# Patient Record
Sex: FEMALE | Race: BLACK OR AFRICAN AMERICAN | NOT HISPANIC OR LATINO | Employment: OTHER | URBAN - METROPOLITAN AREA
[De-identification: names, ages, dates, MRNs, and addresses within clinical notes are randomized per-mention and may not be internally consistent; named-entity substitution may affect disease eponyms.]

---

## 2017-10-27 ENCOUNTER — HOSPITAL ENCOUNTER (EMERGENCY)
Facility: HOSPITAL | Age: 52
Discharge: HOME | End: 2017-10-27
Attending: EMERGENCY MEDICINE
Payer: MEDICAID

## 2017-10-27 VITALS
DIASTOLIC BLOOD PRESSURE: 90 MMHG | SYSTOLIC BLOOD PRESSURE: 191 MMHG | HEIGHT: 69 IN | TEMPERATURE: 99 F | HEART RATE: 78 BPM | WEIGHT: 175 LBS | OXYGEN SATURATION: 98 % | BODY MASS INDEX: 25.92 KG/M2 | RESPIRATION RATE: 16 BRPM

## 2017-10-27 DIAGNOSIS — K08.89 PAIN, DENTAL: Primary | ICD-10-CM

## 2017-10-27 PROCEDURE — 25000003 PHARM REV CODE 250: Performed by: PHYSICIAN ASSISTANT

## 2017-10-27 PROCEDURE — 99284 EMERGENCY DEPT VISIT MOD MDM: CPT | Mod: ,,, | Performed by: PHYSICIAN ASSISTANT

## 2017-10-27 PROCEDURE — 99283 EMERGENCY DEPT VISIT LOW MDM: CPT

## 2017-10-27 RX ORDER — ACETAMINOPHEN 325 MG/1
650 TABLET ORAL
Status: COMPLETED | OUTPATIENT
Start: 2017-10-27 | End: 2017-10-27

## 2017-10-27 RX ORDER — CLINDAMYCIN HYDROCHLORIDE 150 MG/1
300 CAPSULE ORAL 4 TIMES DAILY
Qty: 56 CAPSULE | Refills: 0 | Status: SHIPPED | OUTPATIENT
Start: 2017-10-27 | End: 2017-11-03

## 2017-10-27 RX ORDER — PROMETHAZINE HYDROCHLORIDE 25 MG/1
25 TABLET ORAL EVERY 4 HOURS
COMMUNITY

## 2017-10-27 RX ORDER — TRAMADOL HYDROCHLORIDE 50 MG/1
50 TABLET ORAL EVERY 6 HOURS PRN
COMMUNITY

## 2017-10-27 RX ORDER — CLINDAMYCIN HYDROCHLORIDE 150 MG/1
300 CAPSULE ORAL
Status: COMPLETED | OUTPATIENT
Start: 2017-10-27 | End: 2017-10-27

## 2017-10-27 RX ORDER — IBUPROFEN 600 MG/1
600 TABLET ORAL
Status: COMPLETED | OUTPATIENT
Start: 2017-10-27 | End: 2017-10-27

## 2017-10-27 RX ORDER — CLONAZEPAM 0.5 MG/1
0.5 TABLET ORAL DAILY
COMMUNITY

## 2017-10-27 RX ADMIN — ACETAMINOPHEN 650 MG: 325 TABLET ORAL at 09:10

## 2017-10-27 RX ADMIN — IBUPROFEN 600 MG: 600 TABLET, FILM COATED ORAL at 09:10

## 2017-10-27 RX ADMIN — CLINDAMYCIN HYDROCHLORIDE 300 MG: 150 CAPSULE ORAL at 09:10

## 2017-10-27 NOTE — ED TRIAGE NOTES
Presents to ER with Bilateral lower gum pain associated with dental caries for 3 days.    GENERAL: The patient is well-developed and well-nourished in no apparent distress. Alert and oriented x4.                                                HEENT: Head is normocephalic and atraumatic. Extraocular muscles are intact. Pupils are equal, round, and reactive to light and accommodation. Nares appeared normal. Mouth is well hydrated and without lesions. Mucous membranes are moist. Posterior pharynx clear of any exudate or lesions.    NECK: Supple. No carotid bruits. No lymphadenopathy or thyromegaly.    LUNGS: Clear to auscultation.    HEART: Regular rate and rhythm without murmur.     ABDOMEN: Soft, nontender, and nondistended. Positive bowel sounds. No hepatosplenomegaly was noted.     EXTREMITIES: Without any cyanosis, clubbing, rash, lesions or edema.     NEUROLOGIC: Cranial nerves II through XII are grossly intact.     PSYCHIATRIC: Flat affect, but denies suicidal or homicidal ideations.    SKIN: No ulceration or induration present.

## 2017-10-27 NOTE — ED PROVIDER NOTES
Encounter Date: 10/27/2017    SCRIBE #1 NOTE: I, Mahi Lazaro, am scribing for, and in the presence of,  Dr. Morris. I have scribed the following portions of the note - the APC attestation.       History     Chief Complaint   Patient presents with    Dental Pain     Pt reports toothache x 3 days.     Patient is a 82-year-old female with a past medical history of Crohn's disease to ER with bilateral lower gum pain associated with dental caries for 3 days. Patient states that she has had poor dentition for some time now.  She developed toothache 3 days ago, mainly to her right lower molars.  She complains of 10/10 pain.  She states that it hurts to eat and drink, and she has to chew with her front teeth due to the pain in the back.  She denies any fever or chills.  Patient states that her partner is in the hospital, and wanted to come into the ED to see if she can get her tooth pulled.          Review of patient's allergies indicates:   Allergen Reactions    Azathioprine Rash    Codeine Rash     Past Medical History:   Diagnosis Date    Crohn's disease      Past Surgical History:   Procedure Laterality Date    ABDOMINAL SURGERY      BACK SURGERY      COLECTOMY      TUBAL LIGATION       History reviewed. No pertinent family history.  Social History   Substance Use Topics    Smoking status: Never Smoker    Smokeless tobacco: Never Used    Alcohol use Yes      Comment: socially     Review of Systems   Constitutional: Positive for appetite change. Negative for chills and fever.   HENT: Positive for dental problem. Negative for facial swelling and sore throat.    Eyes: Negative for visual disturbance.   Respiratory: Negative for shortness of breath.    Cardiovascular: Negative for chest pain.   Gastrointestinal: Negative for nausea.   Endocrine: Negative for polyuria.   Genitourinary: Negative for dysuria and urgency.   Musculoskeletal: Negative for back pain.   Skin: Negative for rash.   Neurological:  Negative for weakness.   Hematological: Does not bruise/bleed easily.       Physical Exam     Initial Vitals [10/27/17 0836]   BP Pulse Resp Temp SpO2   (!) 191/90 78 16 98.6 °F (37 °C) 98 %      MAP       123.67         Physical Exam    Nursing note and vitals reviewed.  Constitutional: She appears well-developed and well-nourished. She is not diaphoretic. No distress.   HENT:   Head: Normocephalic and atraumatic.   Nose: Nose normal.   Mouth/Throat: Uvula is midline, oropharynx is clear and moist and mucous membranes are normal. No trismus in the jaw. Abnormal dentition. Dental caries present. No dental abscesses.       Eyes: Conjunctivae and EOM are normal.   Neck: Normal range of motion.   Cardiovascular: Normal rate, regular rhythm and normal heart sounds. Exam reveals no friction rub.    No murmur heard.  Pulmonary/Chest: Breath sounds normal. No respiratory distress. She has no wheezes. She has no rales.   Abdominal: Soft. Bowel sounds are normal. She exhibits no distension. There is no tenderness. There is no rebound.   Musculoskeletal: Normal range of motion.   Neurological: She is alert and oriented to person, place, and time. She has normal strength. No sensory deficit.   Skin: Skin is warm and dry. No erythema. No pallor.   Psychiatric: She has a normal mood and affect. Her behavior is normal. Judgment and thought content normal.         ED Course   Procedures  Labs Reviewed - No data to display          Medical Decision Making:   History:   Old Medical Records: I decided to obtain old medical records.       APC / Resident Notes:   On exam, afebrile.  Poor dentition.  Cracked right lower first molar with tenderness to palpation and surrounding erythema.  No dental abscess or peritonsillar abscess noted.  She is speaking in full sentences.  She does not need any imaging or labs.  I will treat her dental infection and prescribed oral clindamycin.  She is to take OTC ibuprofen and Tylenol on a scheduled  basis for pain.  She has tramadol for her Crohn's disease that she can take for severe pain prn.  I informed her that we do no pull teeth. She is to follow-up with the dentist.  Dental resources provided.  She is comfortable with plan and stable for discharge.  I have reviewed patient's chart and discussed this case with my supervising ANGELA Bills Attestation:   Yomairaibe #1: I performed the above scribed service and the documentation accurately describes the services I performed. I attest to the accuracy of the note.    Attending Attestation:     Physician Attestation Statement for NP/PA:   I discussed this assessment and plan of this patient with the NP/PA, but I did not personally examine the patient. The face to face encounter was performed by the NP/PA.    Other NP/PA Attestation Additions:    History of Present Illness:  52 y.o. woman complains of dental pain, especially a lower molar on the right. Her  is currently on the hospital and she thought she would come and get this looked at while here.     Medical Decision Making:  She has poor dentition but has one tooth that seems to be giving her most of her issues, the first mandibular molar on the right. Given that there is some gingival erythema surrounding the tooth, she was prescribed a course of antibiotics and was given a list of dental clinic resources.                 ED Course      Clinical Impression:   The encounter diagnosis was Pain, dental.    Disposition:   Disposition: Discharged  Condition: Stable                Brenda Pastrana PA-C  10/28/17 0731       Brenda Pastrana PA-C  10/28/17 0732

## 2017-10-27 NOTE — DISCHARGE INSTRUCTIONS
See dental resource sheet that is provided.  Call insurance to see which dentist you can see.    Take ibuprofen and tylenol on a scheduled basis for pain relief and take tramadol that is prescribed to you by your GI doctor for severe pain.  Take clindamycin 4 times a day which is every 6 hours for your dental pain.    No future appointments.    Our goal in the emergency department is to always give you outstanding care and exceptional service. You may receive a survey by mail or e-mail in the next week regarding your experience in our ED. We would greatly appreciate your completing and returning the survey. Your feedback provides us with a way to recognize our staff who give very good care and it helps us learn how to improve when your experience was below our aspiration of excellence.

## 2019-07-25 RX ORDER — ALBUTEROL SULFATE 0.63 MG/3ML
0.63 SOLUTION RESPIRATORY (INHALATION) EVERY 6 HOURS PRN
COMMUNITY

## 2019-07-25 RX ORDER — MULTIVITAMIN
1 TABLET ORAL DAILY
COMMUNITY

## 2019-07-25 RX ORDER — ALBUTEROL SULFATE 0.83 MG/ML
2.5 SOLUTION RESPIRATORY (INHALATION) EVERY 6 HOURS PRN
COMMUNITY

## 2019-08-05 ENCOUNTER — HOSPITAL ENCOUNTER (OUTPATIENT)
Facility: HOSPITAL | Age: 54
Discharge: HOME | End: 2019-08-05
Attending: INTERNAL MEDICINE | Admitting: INTERNAL MEDICINE
Payer: MEDICAID

## 2019-08-05 ENCOUNTER — ANESTHESIA (OUTPATIENT)
Dept: SURGERY | Facility: HOSPITAL | Age: 54
End: 2019-08-05
Payer: MEDICAID

## 2019-08-05 ENCOUNTER — ANESTHESIA EVENT (OUTPATIENT)
Dept: SURGERY | Facility: HOSPITAL | Age: 54
End: 2019-08-05
Payer: MEDICAID

## 2019-08-05 VITALS
OXYGEN SATURATION: 99 % | BODY MASS INDEX: 25.18 KG/M2 | WEIGHT: 170 LBS | RESPIRATION RATE: 16 BRPM | HEIGHT: 69 IN | DIASTOLIC BLOOD PRESSURE: 94 MMHG | HEART RATE: 60 BPM | TEMPERATURE: 98 F | SYSTOLIC BLOOD PRESSURE: 161 MMHG

## 2019-08-05 DIAGNOSIS — Z01.818 PRE-OP TESTING: ICD-10-CM

## 2019-08-05 DIAGNOSIS — R10.13 EPIGASTRIC ABDOMINAL PAIN OF UNKNOWN ETIOLOGY: ICD-10-CM

## 2019-08-05 PROCEDURE — 93005 ELECTROCARDIOGRAM TRACING: CPT

## 2019-08-05 PROCEDURE — 27000671 HC TUBING MICROBORE EXT: Performed by: ANESTHESIOLOGY

## 2019-08-05 PROCEDURE — 27200043 HC FORCEPS, BIOPSY: Performed by: INTERNAL MEDICINE

## 2019-08-05 PROCEDURE — 63600175 PHARM REV CODE 636 W HCPCS: Performed by: INTERNAL MEDICINE

## 2019-08-05 PROCEDURE — 37000009 HC ANESTHESIA EA ADD 15 MINS: Performed by: INTERNAL MEDICINE

## 2019-08-05 PROCEDURE — 00520 ANES CLOSED CHEST PX NOS: CPT | Mod: 59 | Performed by: INTERNAL MEDICINE

## 2019-08-05 PROCEDURE — 43239 EGD BIOPSY SINGLE/MULTIPLE: CPT | Performed by: INTERNAL MEDICINE

## 2019-08-05 PROCEDURE — 00731 ANES UPR GI NDSC PX NOS: CPT | Performed by: INTERNAL MEDICINE

## 2019-08-05 PROCEDURE — 63600175 PHARM REV CODE 636 W HCPCS: Performed by: NURSE ANESTHETIST, CERTIFIED REGISTERED

## 2019-08-05 PROCEDURE — 99152 MOD SED SAME PHYS/QHP 5/>YRS: CPT | Mod: 59 | Performed by: INTERNAL MEDICINE

## 2019-08-05 PROCEDURE — 37000008 HC ANESTHESIA 1ST 15 MINUTES: Performed by: INTERNAL MEDICINE

## 2019-08-05 PROCEDURE — 43450 DILATE ESOPHAGUS 1/MULT PASS: CPT | Performed by: INTERNAL MEDICINE

## 2019-08-05 RX ORDER — FENTANYL CITRATE 50 UG/ML
INJECTION, SOLUTION INTRAMUSCULAR; INTRAVENOUS
Status: DISCONTINUED | OUTPATIENT
Start: 2019-08-05 | End: 2019-08-05

## 2019-08-05 RX ORDER — ONDANSETRON 2 MG/ML
INJECTION INTRAMUSCULAR; INTRAVENOUS
Status: DISCONTINUED | OUTPATIENT
Start: 2019-08-05 | End: 2019-08-05

## 2019-08-05 RX ORDER — SODIUM CHLORIDE 0.9 % (FLUSH) 0.9 %
2 SYRINGE (ML) INJECTION
Status: DISCONTINUED | OUTPATIENT
Start: 2019-08-05 | End: 2019-08-05 | Stop reason: HOSPADM

## 2019-08-05 RX ORDER — SODIUM CHLORIDE 9 MG/ML
INJECTION, SOLUTION INTRAVENOUS CONTINUOUS
Status: DISCONTINUED | OUTPATIENT
Start: 2019-08-05 | End: 2019-08-05 | Stop reason: HOSPADM

## 2019-08-05 RX ORDER — PROPOFOL 10 MG/ML
VIAL (ML) INTRAVENOUS CONTINUOUS PRN
Status: DISCONTINUED | OUTPATIENT
Start: 2019-08-05 | End: 2019-08-05

## 2019-08-05 RX ADMIN — FENTANYL CITRATE 50 MCG: 50 INJECTION INTRAMUSCULAR; INTRAVENOUS at 09:08

## 2019-08-05 RX ADMIN — SODIUM CHLORIDE: 0.9 INJECTION, SOLUTION INTRAVENOUS at 08:08

## 2019-08-05 RX ADMIN — ONDANSETRON 4 MG: 2 INJECTION INTRAMUSCULAR; INTRAVENOUS at 09:08

## 2019-08-05 RX ADMIN — PROPOFOL 400 MCG/KG/MIN: 10 INJECTION, EMULSION INTRAVENOUS at 08:08

## 2019-08-05 NOTE — H&P
"Subjective:       Patient ID: Spencer Levi is a 54 y.o. female.    Chief Complaint:  No chief complaint on file.  dysphagia    History of Present Illness  Dysphagia  Patient complains of difficulty swallowing. The dysphagia occurs with solids Symptoms have been present for approximately 5 weeks. The symptoms are gradually worsening. The dysphagia has been intermittent and has been progressive.  She complains of occasional heartburn.  She denies melena, hematochezia, hematemesis, and coffee ground emesis.  This has been associated with belching.  She denies cough and early satiety.    10/2015 egd with grossly normal.  Biopsies to rule out Eoe normal  Review of Systems  A comprehensive review of systems was negative.      Objective:      BP (!) 159/116   Pulse (!) 55   Temp 98.1 °F (36.7 °C) (Oral)   Resp 19   Ht 5' 9" (1.753 m)   Wt 77.1 kg (170 lb)   SpO2 100%   Breastfeeding? No   BMI 25.10 kg/m²   Physical Exam:  General- Patient alert and oriented x3 in NAD  HEENT- PERRLA, EOMI, OP clear, MMM  Neck- No JVD, Lymphadenopathy, Thyromegaly  CV- Regular rate and rhythm, No Murmur/fredi/rubs  Resp- Lungs CTA Bilaterally, No increased WOB  GI- Non tender/non-distended, BS normoactive x4 quads, no HSM  Extrem- No cyanosis, clubbing, edema. Pulses 2+ and symmetric  Neuro- Strength 5/5 flexors/extensors, DTRs 2+ and symmetric, Intact sensation to light touch grossly     Data ReviewCBC: No results found for: WBC, RBC, HGB, HCT, PLT  BMP: No results found for: GLU, NA, K, CL, CO2, BUN, CREATININE, CALCIUM      Assessment:        1. Pre-op testing    2. Epigastric abdominal pain of unknown etiology       Ileal crohns patient stable  humira  Reports dysphagia distal esophagus     Plan:       egd today    "

## 2019-08-05 NOTE — TRANSFER OF CARE
"Anesthesia Transfer of Care Note    Patient: Spencer Levi    Procedure(s) Performed: Procedure(s) (LRB):  EGD (ESOPHAGOGASTRODUODENOSCOPY) (N/A)  DILATION, ESOPHAGUS (N/A)    Patient location: GI    Anesthesia Type: MAC    Transport from OR: Transported from OR on 2-3 L/min O2 by NC with adequate spontaneous ventilation    Post pain: adequate analgesia    Post assessment: no apparent anesthetic complications    Post vital signs: stable    Level of consciousness: awake and alert    Nausea/Vomiting: no nausea/vomiting    Complications: none    Transfer of care protocol was followed      Last vitals:   Visit Vitals  BP (!) 148/92 (BP Location: Left arm, Patient Position: Lying)   Pulse 63   Temp 36.5 °C (97.7 °F) (Oral)   Resp 17   Ht 5' 9" (1.753 m)   Wt 77.1 kg (170 lb)   SpO2 100%   Breastfeeding? No   BMI 25.10 kg/m²     "

## 2019-08-05 NOTE — TRANSFER OF CARE
"Anesthesia Transfer of Care Note    Patient: Spencer Levi    Procedure(s) Performed: Procedure(s) (LRB):  EGD (ESOPHAGOGASTRODUODENOSCOPY) (N/A)  DILATION, ESOPHAGUS (N/A)    Anesthesia PACU Handoff    Last vitals:   Visit Vitals  BP (!) 148/92 (BP Location: Left arm, Patient Position: Lying)   Pulse 63   Temp 36.5 °C (97.7 °F) (Oral)   Resp 17   Ht 5' 9" (1.753 m)   Wt 77.1 kg (170 lb)   SpO2 100%   Breastfeeding? No   BMI 25.10 kg/m²     "

## 2019-08-05 NOTE — [employer] PATIENT INSTRUCTIONS
Discharge Summary/Instructions after an Endoscopic Procedure  Patient Name: Spencer Levi  Patient MRN: [id number]  Patient YOB: 1965 Monday, August 05, 2019  Felicita Miller MD  RESTRICTIONS:  During your procedure today, you received medications for sedation.  These   medications may affect your judgment, balance and coordination.  Therefore,   for 24 hours, you have the following restrictions:   - DO NOT drive a car, operate machinery, make legal/financial decisions,   sign important papers or drink alcohol.    ACTIVITY:  Today: no heavy lifting, straining or running due to procedural   sedation/anesthesia.  The following day: return to full activity including work.  DIET:  Eat and drink normally unless instructed otherwise.     TREATMENT FOR COMMON SIDE EFFECTS:  - Mild abdominal pain, nausea, belching, bloating or excessive gas:  rest,   eat lightly and use a heating pad.  - Sore Throat: treat with throat lozenges and/or gargle with warm salt   water.  - Because air was used during the procedure, expelling large amounts of air   from your rectum or belching is normal.  - If a bowel prep was taken, you may not have a bowel movement for 1-3 days.    This is normal.  SYMPTOMS TO WATCH FOR AND REPORT TO YOUR PHYSICIAN:  1. Abdominal pain or bloating, other than gas cramps.  2. Chest pain.  3. Back pain.  4. Signs of infection such as: chills or fever occurring within 24 hours   after the procedure.  5. Rectal bleeding, which would show as bright red, maroon, or black stools.   (A tablespoon of blood from the rectum is not serious, especially if   hemorrhoids are present.)  6. Vomiting.  7. Weakness or dizziness.  GO DIRECTLY TO THE NEAREST EMERGENCY ROOM IF YOU HAVE ANY OF THE FOLLOWING:      Difficulty breathing              Chills and/or fever over 101 F   Persistent vomiting and/or vomiting blood   Severe abdominal pain   Severe chest pain   Black, tarry stools   Bleeding- more than one  tablespoon   Any other symptom or condition that you feel may need urgent attention  Your doctor recommends these additional instructions:  If any biopsies were taken, your doctors clinic will contact you in 1 to 2   weeks with any results.  - Await pathology results.   - Repeat upper endoscopy PRN for retreatment.   - Discharge patient to home (with escort).  For questions, problems or results please call your physician - Felicita Miller MD at Work:  [phone].  [facility], EMERGENCY ROOM PHONE NUMBER: [phone]  IF A COMPLICATION OR EMERGENCY SITUATION ARISES AND YOU ARE UNABLE TO REACH   YOUR PHYSICIAN - GO DIRECTLY TO THE EMERGENCY ROOM.  Felicita Miller MD  8/5/2019 9:01:53 AM  This report has been verified and signed electronically.  [employer]

## 2019-08-05 NOTE — ANESTHESIA PREPROCEDURE EVALUATION
08/05/2019  Spencer Levi is a 54 y.o., female.    Anesthesia Evaluation    I have reviewed the Patient Summary Reports.    I have reviewed the Nursing Notes.   I have reviewed the Medications.     Review of Systems  Anesthesia Hx:  No problems with previous Anesthesia  History of prior surgery of interest to airway management or planning: Previous anesthesia: General, MAC Denies Family Hx of Anesthesia complications.   Denies Personal Hx of Anesthesia complications.   Social:  Non-Smoker    EENT/Dental:   Denies Throat Symptoms Denies Throat Disease.    Cardiovascular:  Cardiovascular Normal     Pulmonary:   Asthma asymptomatic    Hepatic/GI:   PUD,    Musculoskeletal:  Spine Disorders: lumbar Degenerative disease and Chronic Pain    Psych:   Psychiatric History anxiety depression          Physical Exam  General:  Well nourished    Airway/Jaw/Neck:  Airway Findings: Mouth Opening: Normal Tongue: Normal  General Airway Assessment: Adult  Mallampati: II  Improves to II with phonation.  TM Distance: Normal, at least 6 cm  Jaw/Neck Findings:  Neck ROM: Normal ROM       Chest/Lungs:  Chest/Lungs Findings: Clear to auscultation, Normal Respiratory Rate     Heart/Vascular:  Heart Findings: Rate: Normal  Rhythm: Regular Rhythm  Sounds: Normal     Abdomen:  Abdomen Findings: Normal    Musculoskeletal:  Musculoskeletal Findings: Normal   Skin:  Skin Findings: Normal    Mental Status:  Mental Status Findings:  Cooperative, Alert and Oriented         Anesthesia Plan  Type of Anesthesia, risks & benefits discussed:  Anesthesia Type:  MAC  Patient's Preference:   Intra-op Monitoring Plan: standard ASA monitors  Intra-op Monitoring Plan Comments:   Post Op Pain Control Plan: per primary service following discharge from PACU  Post Op Pain Control Plan Comments:   Induction:    Beta Blocker:  Patient is not currently on a  Beta-Blocker (No further documentation required).       Informed Consent: Patient understands risks and agrees with Anesthesia plan.  Questions answered. Anesthesia consent signed with patient.  ASA Score: 3     Day of Surgery Review of History & Physical:        Anesthesia Plan Notes: MAC propofol        Ready For Surgery From Anesthesia Perspective.

## 2019-08-05 NOTE — ANESTHESIA POSTPROCEDURE EVALUATION
Anesthesia Post Evaluation    Patient: Spencer Levi    Procedure(s) Performed: Procedure(s) (LRB):  EGD (ESOPHAGOGASTRODUODENOSCOPY) (N/A)  DILATION, ESOPHAGUS (N/A)    Final Anesthesia Type: MAC  Patient location during evaluation: GI PACU  Patient participation: Yes- Able to Participate  Level of consciousness: awake and alert and awake  Post-procedure vital signs: reviewed and stable  Pain management: adequate  Airway patency: patent  PONV status at discharge: No PONV  Anesthetic complications: no      Cardiovascular status: blood pressure returned to baseline and hemodynamically stable  Respiratory status: unassisted, spontaneous ventilation and room air  Hydration status: euvolemic  Follow-up not needed.          Vitals Value Taken Time   /86 8/5/2019  9:13 AM   Temp 36.5 °C (97.7 °F) 8/5/2019  8:58 AM   Pulse 53 8/5/2019  9:13 AM   Resp 16 8/5/2019  9:13 AM   SpO2 99 % 8/5/2019  9:13 AM         No case tracking events are documented in the log.      Pain/Miguelito Score: No data recorded

## 2019-10-21 ENCOUNTER — HOSPITAL ENCOUNTER (OUTPATIENT)
Facility: HOSPITAL | Age: 54
Discharge: HOME | End: 2019-10-21
Attending: INTERNAL MEDICINE | Admitting: INTERNAL MEDICINE
Payer: MEDICAID

## 2019-10-21 ENCOUNTER — ANESTHESIA EVENT (OUTPATIENT)
Dept: SURGERY | Facility: HOSPITAL | Age: 54
End: 2019-10-21
Payer: MEDICAID

## 2019-10-21 ENCOUNTER — ANESTHESIA (OUTPATIENT)
Dept: SURGERY | Facility: HOSPITAL | Age: 54
End: 2019-10-21
Payer: MEDICAID

## 2019-10-21 VITALS
OXYGEN SATURATION: 100 % | BODY MASS INDEX: 23.7 KG/M2 | DIASTOLIC BLOOD PRESSURE: 99 MMHG | HEIGHT: 69 IN | SYSTOLIC BLOOD PRESSURE: 164 MMHG | WEIGHT: 160 LBS | RESPIRATION RATE: 16 BRPM | HEART RATE: 50 BPM | TEMPERATURE: 98 F

## 2019-10-21 DIAGNOSIS — K50.90 CROHN DISEASE: ICD-10-CM

## 2019-10-21 LAB
ERYTHROCYTE [DISTWIDTH] IN BLOOD BY AUTOMATED COUNT: 12.9 % (ref 11.5–14.5)
HCT VFR BLD AUTO: 39.8 % (ref 37–48.5)
HGB BLD-MCNC: 12.9 G/DL (ref 12–16)
MCH RBC QN AUTO: 31.7 PG (ref 27–31)
MCHC RBC AUTO-ENTMCNC: 32.4 G/DL (ref 32–36)
MCV RBC AUTO: 98 FL (ref 82–98)
PLATELET # BLD AUTO: 215 K/UL (ref 150–350)
PMV BLD AUTO: 11.3 FL (ref 9.2–12.9)
RBC # BLD AUTO: 4.07 M/UL (ref 4–5.4)
WBC # BLD AUTO: 3.78 K/UL (ref 3.9–12.7)

## 2019-10-21 PROCEDURE — 27200043 HC FORCEPS, BIOPSY: Performed by: INTERNAL MEDICINE

## 2019-10-21 PROCEDURE — 45385 COLONOSCOPY W/LESION REMOVAL: CPT | Performed by: INTERNAL MEDICINE

## 2019-10-21 PROCEDURE — 45380 COLONOSCOPY AND BIOPSY: CPT | Performed by: INTERNAL MEDICINE

## 2019-10-21 PROCEDURE — 37000009 HC ANESTHESIA EA ADD 15 MINS: Performed by: INTERNAL MEDICINE

## 2019-10-21 PROCEDURE — 63600175 PHARM REV CODE 636 W HCPCS: Performed by: NURSE ANESTHETIST, CERTIFIED REGISTERED

## 2019-10-21 PROCEDURE — 63600175 PHARM REV CODE 636 W HCPCS: Performed by: INTERNAL MEDICINE

## 2019-10-21 PROCEDURE — 27201114 HC TRAP (ANY): Performed by: INTERNAL MEDICINE

## 2019-10-21 PROCEDURE — 27201089 HC SNARE, DISP (ANY): Performed by: INTERNAL MEDICINE

## 2019-10-21 PROCEDURE — 37000008 HC ANESTHESIA 1ST 15 MINUTES: Performed by: INTERNAL MEDICINE

## 2019-10-21 PROCEDURE — 00811 ANES LWR INTST NDSC NOS: CPT | Performed by: INTERNAL MEDICINE

## 2019-10-21 PROCEDURE — 85027 COMPLETE CBC AUTOMATED: CPT

## 2019-10-21 RX ORDER — SODIUM CHLORIDE 0.9 % (FLUSH) 0.9 %
2 SYRINGE (ML) INJECTION
Status: DISCONTINUED | OUTPATIENT
Start: 2019-10-21 | End: 2019-10-21 | Stop reason: HOSPADM

## 2019-10-21 RX ORDER — SODIUM CHLORIDE 9 MG/ML
INJECTION, SOLUTION INTRAVENOUS CONTINUOUS
Status: DISCONTINUED | OUTPATIENT
Start: 2019-10-21 | End: 2019-10-21 | Stop reason: HOSPADM

## 2019-10-21 RX ORDER — MEPERIDINE HYDROCHLORIDE 50 MG/ML
25 INJECTION INTRAMUSCULAR; INTRAVENOUS; SUBCUTANEOUS ONCE
Status: COMPLETED | OUTPATIENT
Start: 2019-10-21 | End: 2019-10-21

## 2019-10-21 RX ORDER — SODIUM CHLORIDE 9 MG/ML
INJECTION, SOLUTION INTRAVENOUS CONTINUOUS PRN
Status: DISCONTINUED | OUTPATIENT
Start: 2019-10-21 | End: 2019-10-21

## 2019-10-21 RX ORDER — PROPOFOL 10 MG/ML
VIAL (ML) INTRAVENOUS
Status: DISCONTINUED | OUTPATIENT
Start: 2019-10-21 | End: 2019-10-21

## 2019-10-21 RX ADMIN — PROPOFOL 20 MG: 10 INJECTION, EMULSION INTRAVENOUS at 09:10

## 2019-10-21 RX ADMIN — SODIUM CHLORIDE: 0.9 INJECTION, SOLUTION INTRAVENOUS at 09:10

## 2019-10-21 RX ADMIN — PROPOFOL 30 MG: 10 INJECTION, EMULSION INTRAVENOUS at 09:10

## 2019-10-21 RX ADMIN — PROPOFOL 40 MG: 10 INJECTION, EMULSION INTRAVENOUS at 09:10

## 2019-10-21 RX ADMIN — PROPOFOL 80 MG: 10 INJECTION, EMULSION INTRAVENOUS at 09:10

## 2019-10-21 RX ADMIN — Medication 25 MG: at 10:10

## 2019-10-21 NOTE — [employer]ATION PATIENT INSTRUCTIONS
Discharge Summary/Instructions after an Endoscopic Procedure  Patient Name: Spencer Levi  Patient MRN: [id number]  Patient YOB: 1965 Monday, October 21, 2019  Felicita Miller MD  RESTRICTIONS:  During your procedure today, you received medications for sedation.  These   medications may affect your judgment, balance and coordination.  Therefore,   for 24 hours, you have the following restrictions:   - DO NOT drive a car, operate machinery, make legal/financial decisions,   sign important papers or drink alcohol.    ACTIVITY:  Today: no heavy lifting, straining or running due to procedural   sedation/anesthesia.  The following day: return to full activity including work.  DIET:  Eat and drink normally unless instructed otherwise.     TREATMENT FOR COMMON SIDE EFFECTS:  - Mild abdominal pain, nausea, belching, bloating or excessive gas:  rest,   eat lightly and use a heating pad.  - Sore Throat: treat with throat lozenges and/or gargle with warm salt   water.  - Because air was used during the procedure, expelling large amounts of air   from your rectum or belching is normal.  - If a bowel prep was taken, you may not have a bowel movement for 1-3 days.    This is normal.  SYMPTOMS TO WATCH FOR AND REPORT TO YOUR PHYSICIAN:  1. Abdominal pain or bloating, other than gas cramps.  2. Chest pain.  3. Back pain.  4. Signs of infection such as: chills or fever occurring within 24 hours   after the procedure.  5. Rectal bleeding, which would show as bright red, maroon, or black stools.   (A tablespoon of blood from the rectum is not serious, especially if   hemorrhoids are present.)  6. Vomiting.  7. Weakness or dizziness.  GO DIRECTLY TO THE NEAREST EMERGENCY ROOM IF YOU HAVE ANY OF THE FOLLOWING:      Difficulty breathing              Chills and/or fever over 101 F   Persistent vomiting and/or vomiting blood   Severe abdominal pain   Severe chest pain   Black, tarry stools   Bleeding- more than one  tablespoon   Any other symptom or condition that you feel may need urgent attention  Your doctor recommends these additional instructions:  If any biopsies were taken, your doctors clinic will contact you in 1 to 2   weeks with any results.  - Await pathology results.   - Repeat colonoscopy in 3 years for surveillance.   - Use Humira (adalimumab) at 40 mg per 0.8 mL subcutaneously every other   week indefinitely.   - Discharge patient to home (with escort).  For questions, problems or results please call your physician - Felicita Miller MD at Work:  [phone].  formerly [facility], EMERGENCY ROOM PHONE NUMBER: [phone]  IF A COMPLICATION OR EMERGENCY SITUATION ARISES AND YOU ARE UNABLE TO REACH   YOUR PHYSICIAN - GO DIRECTLY TO THE EMERGENCY ROOM.  Felicita Miller MD  10/21/2019 9:34:13 AM  This report has been verified and signed electronically.  [employer]

## 2019-10-21 NOTE — ANESTHESIA PREPROCEDURE EVALUATION
10/21/2019  Spencer Levi is a 54 y.o., female.    Pre-op Assessment    I have reviewed the Patient Summary Reports.     I have reviewed the Nursing Notes.   I have reviewed the Medications.     Review of Systems  Anesthesia Hx:  No problems with previous Anesthesia  History of prior surgery of interest to airway management or planning: Previous anesthesia: General, MAC Denies Family Hx of Anesthesia complications.   Denies Personal Hx of Anesthesia complications.   Social:  Non-Smoker    EENT/Dental:   Denies Throat Symptoms Denies Throat Disease.    Cardiovascular:  Cardiovascular Normal     Pulmonary:   Asthma asymptomatic    Hepatic/GI:   PUD,    Musculoskeletal:  Spine Disorders: lumbar Degenerative disease and Chronic Pain    Psych:   Psychiatric History anxiety depression          Physical Exam  General:  Well nourished    Airway/Jaw/Neck:  Airway Findings: Mouth Opening: Normal Tongue: Normal  General Airway Assessment: Adult  Mallampati: II  Improves to I with phonation.  TM Distance: Normal, at least 6 cm  Jaw/Neck Findings:  Neck ROM: Normal ROM       Chest/Lungs:  Chest/Lungs Findings: Clear to auscultation, Normal Respiratory Rate     Heart/Vascular:  Heart Findings: Rate: Normal  Rhythm: Regular Rhythm  Sounds: Normal  Heart murmur: negative    Abdomen:  Abdomen Findings: Normal    Musculoskeletal:  Musculoskeletal Findings: Normal   Skin:  Skin Findings: Normal    Mental Status:  Mental Status Findings:  Cooperative, Alert and Oriented         Anesthesia Plan  Type of Anesthesia, risks & benefits discussed:  Anesthesia Type:  MAC  Patient's Preference:   Intra-op Monitoring Plan: standard ASA monitors  Intra-op Monitoring Plan Comments:   Post Op Pain Control Plan: per primary service following discharge from PACU  Post Op Pain Control Plan Comments:   Induction:    Beta Blocker:  Patient is  not currently on a Beta-Blocker (No further documentation required).       Informed Consent: Patient understands risks and agrees with Anesthesia plan.  Questions answered. Anesthesia consent signed with patient.  ASA Score: 2     Day of Surgery Review of History & Physical:        Anesthesia Plan Notes: MAC propofol        Ready For Surgery From Anesthesia Perspective.

## 2019-10-21 NOTE — H&P
[facility]  Gastroenterology  Consult Note    Patient Name: Spencer Levi  MRN: [id number]  Admission Date: 10/21/2019  Hospital Length of Stay: 0 days  Code Status: Full Code   Attending Provider: Felicita Miller MD   Consulting Provider: Felicita Miller MD  Primary Care Physician: Miles Mabry MD  Principal Problem:<principal problem not specified>    Consults  Subjective:     HPI:  54-year-old female patient currently on Humira monotherapy doing well since the ileocecal to me in 2009.  She has been doing quite well. Her last surveillance colonoscopy    Was in 2017.  Pertinent Past IBD Therapies:  Steroids methotrexate and Humira    Pertinent Past GI Studies: CT: ileocecectomy    Past Medical History:   Diagnosis Date    Anxiety U    Asthma     Crohn's disease     Depression U    Dysphagia U    Menopause     Nausea & vomiting U    Peptic ulcer disease U    Scoliosis U        Past Surgical History:   Procedure Laterality Date    ABDOMINAL SURGERY      APPENDECTOMY      BACK SURGERY      COLECTOMY      COLECTOMY  2007    COLONOSCOPY  2014    COLONOSCOPY  10/30/2017    ESOPHAGEAL DILATION N/A 8/5/2019    Procedure: DILATION, ESOPHAGUS;  Surgeon: Felicita Miller MD;  Location: [facility];  Service: Endoscopy;  Laterality: N/A;    ESOPHAGOGASTRODUODENOSCOPY  05/12/2014    ESOPHAGOGASTRODUODENOSCOPY N/A 8/5/2019    Procedure: EGD (ESOPHAGOGASTRODUODENOSCOPY);  Surgeon: Felicita Miller MD;  Location: [facility];  Service: Endoscopy;  Laterality: N/A;    ESOPHAGOGASTRODUODENOSCOPY (EGD) WITH DILATION  10/19/2015    INSERTION OF VENOUS ACCESS PORT  2007    TUBAL LIGATION         Review of patient's allergies indicates:   Allergen Reactions    Azathioprine Rash    Codeine Rash     Family History     None        Tobacco Use    Smoking status: Current Every Day Smoker     Packs/day: 0.25    Smokeless tobacco: Never Used   Substance and Sexual Activity    Alcohol use: Yes      Alcohol/week: 3.0 standard drinks     Types: 3 Shots of liquor per week     Comment: socially    Drug use: Yes     Frequency: 14.0 times per week     Types: Marijuana     Comment: daily    Sexual activity: Yes     Partners: Male     Birth control/protection: Condom     Review of Systems  Objective:     Vital Signs (Most Recent):  Temp: 98.3 °F (36.8 °C) (10/21/19 0748)  Pulse: 64 (10/21/19 0748)  Resp: 18 (10/21/19 0748)  BP: (!) 165/97 (10/21/19 0748)  SpO2: 97 % (10/21/19 0748) Vital Signs (24h Range):  Temp:  [98.3 °F (36.8 °C)] 98.3 °F (36.8 °C)  Pulse:  [64] 64  Resp:  [18] 18  SpO2:  [97 %] 97 %  BP: (165)/(97) 165/97     Weight: 72.6 kg (160 lb) (10/21/19 0748)  Body mass index is 23.63 kg/m².    No intake or output data in the 24 hours ending 10/21/19 0801    Lines/Drains/Airways     Peripheral Intravenous Line                 Peripheral IV - Single Lumen 10/21/19 0750 20 G Right Forearm less than 1 day                Physical Exam  Physical Exam:  General- Patient alert and oriented x3 in NAD  HEENT- PERRLA, EOMI, OP clear, MMM  Neck- No JVD, Lymphadenopathy, Thyromegaly  CV- Regular rate and rhythm, No Murmur/fredi/rubs  Resp- Lungs CTA Bilaterally, No increased WOB  GI- Non tender/non-distended, BS normoactive x4 quads, no HSM  Extrem- No cyanosis, clubbing, edema. Pulses 2+ and symmetric  Neuro- Strength 5/5 flexors/extensors, DTRs 2+ and symmetric, Intact sensation to light touch grossly   Significant Labs:  CBC: No results for input(s): WBC, HGB, HCT, PLT in the last 48 hours.  CMP: No results for input(s): GLU, CALCIUM, ALBUMIN, PROT, NA, K, CO2, CL, BUN, CREATININE, ALKPHOS, ALT, AST, BILITOT in the last 48 hours.     Significant Imaging:  CT: I have reviewed all results within the past 24 hours and my personal findings are:  ilecocecectomy    Assessment/Plan:     Active Diagnoses:    Diagnosis Date Noted POA    Crohn disease [K50.90] 10/21/2019 Yes      Problems Resolved During this Admission:        crohns surveillance colonoscopy today    Thank you for your consult. I will follow-up with patient. Please contact us if you have any additional questions.    Felicita Miller MD  Gastroenterology  [facility]

## 2019-10-21 NOTE — ANESTHESIA POSTPROCEDURE EVALUATION
Anesthesia Post Evaluation    Patient: Spencer Levi    Procedure(s) Performed: Procedure(s) (LRB):  COLONOSCOPY (N/A)    Final Anesthesia Type: MAC  Patient location during evaluation: GI PACU  Patient participation: Yes- Able to Participate  Level of consciousness: awake and alert  Post-procedure vital signs: reviewed and stable  Pain management: adequate  Airway patency: patent  PONV status at discharge: No PONV  Anesthetic complications: no      Cardiovascular status: stable  Respiratory status: unassisted  Hydration status: euvolemic  Follow-up not needed.          Vitals Value Taken Time   /93 10/21/2019  9:32 AM   Temp 36.6 °C (97.9 °F) 10/21/2019  9:32 AM   Pulse 70 10/21/2019  9:32 AM   Resp 16 10/21/2019  9:32 AM   SpO2 100 % 10/21/2019  9:32 AM         No case tracking events are documented in the log.      Pain/Miguelito Score: Pain Rating Prior to Med Admin: 10 (10/21/2019  9:55 AM)

## 2020-12-01 ENCOUNTER — TELEPHONE (OUTPATIENT)
Dept: INFUSION THERAPY | Facility: HOSPITAL | Age: 55
End: 2020-12-01

## 2020-12-07 PROBLEM — Z95.828 ACQUIRED PORTAL-SYSTEMIC SHUNT: Status: ACTIVE | Noted: 2020-12-07

## 2020-12-11 ENCOUNTER — INFUSION (OUTPATIENT)
Dept: INFUSION THERAPY | Facility: HOSPITAL | Age: 55
End: 2020-12-11
Attending: INTERNAL MEDICINE
Payer: MEDICAID

## 2020-12-11 VITALS
DIASTOLIC BLOOD PRESSURE: 87 MMHG | RESPIRATION RATE: 15 BRPM | HEART RATE: 68 BPM | HEIGHT: 69 IN | TEMPERATURE: 97 F | BODY MASS INDEX: 26.36 KG/M2 | OXYGEN SATURATION: 97 % | WEIGHT: 178 LBS | SYSTOLIC BLOOD PRESSURE: 138 MMHG

## 2020-12-11 DIAGNOSIS — Z95.828 ACQUIRED PORTAL-SYSTEMIC SHUNT: Primary | ICD-10-CM

## 2020-12-11 PROCEDURE — 63600175 PHARM REV CODE 636 W HCPCS: Performed by: INTERNAL MEDICINE

## 2020-12-11 PROCEDURE — 96523 IRRIG DRUG DELIVERY DEVICE: CPT

## 2020-12-11 RX ORDER — HEPARIN 100 UNIT/ML
500 SYRINGE INTRAVENOUS
Status: COMPLETED | OUTPATIENT
Start: 2020-12-11 | End: 2020-12-11

## 2020-12-11 RX ORDER — HEPARIN 100 UNIT/ML
500 SYRINGE INTRAVENOUS
Status: CANCELLED | OUTPATIENT
Start: 2020-12-11

## 2020-12-11 RX ADMIN — HEPARIN 500 UNITS: 100 SYRINGE at 09:12

## 2020-12-11 NOTE — PLAN OF CARE
Problem: Infection  Goal: Infection Symptom Resolution  Outcome: Ongoing, Progressing  Intervention: Prevent or Manage Infection  Flowsheets (Taken 12/11/2020 1298)  Infection Management: aseptic technique maintained  Isolation Precautions:   protective environment initiated   protective environment maintained

## 2021-02-05 ENCOUNTER — INFUSION (OUTPATIENT)
Dept: INFUSION THERAPY | Facility: HOSPITAL | Age: 56
End: 2021-02-05
Attending: INTERNAL MEDICINE
Payer: MEDICAID

## 2021-02-05 VITALS
BODY MASS INDEX: 25.5 KG/M2 | SYSTOLIC BLOOD PRESSURE: 153 MMHG | RESPIRATION RATE: 18 BRPM | TEMPERATURE: 98 F | HEART RATE: 62 BPM | HEIGHT: 69 IN | WEIGHT: 172.19 LBS | OXYGEN SATURATION: 98 % | DIASTOLIC BLOOD PRESSURE: 93 MMHG

## 2021-02-05 DIAGNOSIS — Z95.828 ACQUIRED PORTAL-SYSTEMIC SHUNT: Primary | ICD-10-CM

## 2021-02-05 PROCEDURE — 63600175 PHARM REV CODE 636 W HCPCS: Performed by: INTERNAL MEDICINE

## 2021-02-05 PROCEDURE — 96523 IRRIG DRUG DELIVERY DEVICE: CPT

## 2021-02-05 RX ORDER — HEPARIN 100 UNIT/ML
500 SYRINGE INTRAVENOUS
Status: CANCELLED | OUTPATIENT
Start: 2021-02-05

## 2021-02-05 RX ORDER — HEPARIN 100 UNIT/ML
500 SYRINGE INTRAVENOUS
Status: COMPLETED | OUTPATIENT
Start: 2021-02-05 | End: 2021-02-05

## 2021-02-05 RX ADMIN — HEPARIN 500 UNITS: 100 SYRINGE at 10:02

## 2021-04-30 ENCOUNTER — INFUSION (OUTPATIENT)
Dept: INFUSION THERAPY | Facility: HOSPITAL | Age: 56
End: 2021-04-30
Attending: INTERNAL MEDICINE
Payer: MEDICAID

## 2021-04-30 VITALS
RESPIRATION RATE: 18 BRPM | OXYGEN SATURATION: 96 % | HEART RATE: 68 BPM | WEIGHT: 163.88 LBS | BODY MASS INDEX: 23.46 KG/M2 | TEMPERATURE: 97 F | DIASTOLIC BLOOD PRESSURE: 85 MMHG | SYSTOLIC BLOOD PRESSURE: 144 MMHG | HEIGHT: 70 IN

## 2021-04-30 DIAGNOSIS — Z95.828 ACQUIRED PORTAL-SYSTEMIC SHUNT: Primary | ICD-10-CM

## 2021-04-30 PROCEDURE — 96523 IRRIG DRUG DELIVERY DEVICE: CPT

## 2021-04-30 PROCEDURE — 63600175 PHARM REV CODE 636 W HCPCS: Performed by: INTERNAL MEDICINE

## 2021-04-30 RX ORDER — HEPARIN 100 UNIT/ML
500 SYRINGE INTRAVENOUS
Status: COMPLETED | OUTPATIENT
Start: 2021-04-30 | End: 2021-04-30

## 2021-04-30 RX ORDER — HEPARIN 100 UNIT/ML
500 SYRINGE INTRAVENOUS
Status: CANCELLED | OUTPATIENT
Start: 2021-04-30

## 2021-04-30 RX ADMIN — HEPARIN 500 UNITS: 100 SYRINGE at 10:04

## 2021-11-18 ENCOUNTER — TELEPHONE (OUTPATIENT)
Dept: HEMATOLOGY/ONCOLOGY | Facility: CLINIC | Age: 56
End: 2021-11-18
Payer: MEDICAID

## 2021-11-23 ENCOUNTER — INFUSION (OUTPATIENT)
Dept: INFUSION THERAPY | Facility: HOSPITAL | Age: 56
End: 2021-11-23
Attending: INTERNAL MEDICINE
Payer: MEDICAID

## 2021-11-23 VITALS
WEIGHT: 154.69 LBS | BODY MASS INDEX: 22.85 KG/M2 | DIASTOLIC BLOOD PRESSURE: 96 MMHG | SYSTOLIC BLOOD PRESSURE: 173 MMHG | RESPIRATION RATE: 17 BRPM | TEMPERATURE: 97 F | HEART RATE: 80 BPM

## 2021-11-23 DIAGNOSIS — Z95.828 ACQUIRED PORTAL-SYSTEMIC SHUNT: Primary | ICD-10-CM

## 2021-11-23 PROCEDURE — 63600175 PHARM REV CODE 636 W HCPCS: Performed by: INTERNAL MEDICINE

## 2021-11-23 PROCEDURE — 96523 IRRIG DRUG DELIVERY DEVICE: CPT

## 2021-11-23 RX ORDER — HEPARIN 100 UNIT/ML
500 SYRINGE INTRAVENOUS
Status: COMPLETED | OUTPATIENT
Start: 2021-11-23 | End: 2021-11-23

## 2021-11-23 RX ORDER — HEPARIN 100 UNIT/ML
500 SYRINGE INTRAVENOUS
Status: CANCELLED | OUTPATIENT
Start: 2021-11-23

## 2021-11-23 RX ADMIN — HEPARIN 500 UNITS: 100 SYRINGE at 02:11

## 2022-04-29 ENCOUNTER — INFUSION (OUTPATIENT)
Dept: INFUSION THERAPY | Facility: HOSPITAL | Age: 57
End: 2022-04-29
Attending: INTERNAL MEDICINE
Payer: MEDICAID

## 2022-04-29 VITALS
SYSTOLIC BLOOD PRESSURE: 150 MMHG | HEART RATE: 69 BPM | WEIGHT: 146.81 LBS | RESPIRATION RATE: 16 BRPM | BODY MASS INDEX: 21.74 KG/M2 | HEIGHT: 69 IN | TEMPERATURE: 98 F | DIASTOLIC BLOOD PRESSURE: 97 MMHG

## 2022-04-29 DIAGNOSIS — Z95.828 ACQUIRED PORTAL-SYSTEMIC SHUNT: Primary | ICD-10-CM

## 2022-04-29 PROCEDURE — 96523 IRRIG DRUG DELIVERY DEVICE: CPT

## 2022-04-29 PROCEDURE — 63600175 PHARM REV CODE 636 W HCPCS: Performed by: INTERNAL MEDICINE

## 2022-04-29 RX ORDER — HEPARIN 100 UNIT/ML
500 SYRINGE INTRAVENOUS
Status: CANCELLED | OUTPATIENT
Start: 2022-04-29

## 2022-04-29 RX ORDER — HEPARIN 100 UNIT/ML
500 SYRINGE INTRAVENOUS
Status: COMPLETED | OUTPATIENT
Start: 2022-04-29 | End: 2022-04-29

## 2022-04-29 RX ADMIN — Medication 500 UNITS: at 09:04

## 2022-04-29 NOTE — PLAN OF CARE
Problem: Infection  Goal: Absence of Infection Signs and Symptoms  Outcome: Ongoing, Progressing  Intervention: Prevent or Manage Infection  Flowsheets (Taken 4/29/2022 0445)  Infection Management: aseptic technique maintained

## 2022-07-20 DIAGNOSIS — R11.2 NAUSEA & VOMITING: ICD-10-CM

## 2022-07-20 DIAGNOSIS — K51.00: ICD-10-CM

## 2022-07-20 DIAGNOSIS — K50.80 CROHN'S DISEASE OF BOTH SMALL AND LARGE INTESTINE: ICD-10-CM

## 2022-07-20 DIAGNOSIS — Z92.25 H/O IMMUNOSUPPRESSIVE THERAPY: ICD-10-CM

## 2022-07-20 DIAGNOSIS — M12.9 ARTHROPATHY: Primary | ICD-10-CM

## 2022-07-20 DIAGNOSIS — R13.10 DYSPHAGIA: ICD-10-CM

## 2022-07-20 DIAGNOSIS — E55.9 VITAMIN D DEFICIENCY: ICD-10-CM

## 2022-07-22 ENCOUNTER — TELEPHONE (OUTPATIENT)
Dept: INFUSION THERAPY | Facility: HOSPITAL | Age: 57
End: 2022-07-22

## 2022-12-01 ENCOUNTER — TELEPHONE (OUTPATIENT)
Dept: INFUSION THERAPY | Facility: HOSPITAL | Age: 57
End: 2022-12-01

## 2022-12-01 NOTE — TELEPHONE ENCOUNTER
Left voicemail canceling patient for tomorrow. Stated that we need new orders as well as updated H&P from Dr. Miller office. Call back number given for both scheduling department and infusion center.

## 2022-12-01 NOTE — TELEPHONE ENCOUNTER
Left voicemail with Dr. Miller nurse Bethany that patient needs new orders for port flush as well as updated H&P.

## 2023-01-13 ENCOUNTER — TELEPHONE (OUTPATIENT)
Dept: INFUSION THERAPY | Facility: HOSPITAL | Age: 58
End: 2023-01-13

## 2023-01-26 DIAGNOSIS — M12.9 ARTHROPATHY: Primary | ICD-10-CM

## 2023-03-06 ENCOUNTER — HOSPITAL ENCOUNTER (OUTPATIENT)
Dept: RADIOLOGY | Facility: HOSPITAL | Age: 58
Discharge: HOME | End: 2023-03-06
Attending: INTERNAL MEDICINE
Payer: MEDICAID

## 2023-03-06 DIAGNOSIS — M12.9 ARTHROPATHY: ICD-10-CM

## 2023-03-06 PROCEDURE — 74220 X-RAY XM ESOPHAGUS 1CNTRST: CPT | Mod: TC

## 2023-06-09 ENCOUNTER — TELEPHONE (OUTPATIENT)
Dept: INFUSION THERAPY | Facility: HOSPITAL | Age: 58
End: 2023-06-09

## 2023-06-09 NOTE — TELEPHONE ENCOUNTER
Notified patient regarding missed port flush appt today at 1420. Pt states she has not had a ride and will call and reschedule as soon as she can. Scheduling notified.

## 2023-07-21 ENCOUNTER — INFUSION (OUTPATIENT)
Dept: INFUSION THERAPY | Facility: HOSPITAL | Age: 58
End: 2023-07-21
Attending: INTERNAL MEDICINE
Payer: MEDICAID

## 2023-07-21 VITALS
DIASTOLIC BLOOD PRESSURE: 80 MMHG | SYSTOLIC BLOOD PRESSURE: 158 MMHG | HEART RATE: 84 BPM | OXYGEN SATURATION: 99 % | WEIGHT: 129.31 LBS | TEMPERATURE: 98 F | BODY MASS INDEX: 19.09 KG/M2 | RESPIRATION RATE: 16 BRPM

## 2023-07-21 DIAGNOSIS — Z95.828 ACQUIRED PORTAL-SYSTEMIC SHUNT: Primary | ICD-10-CM

## 2023-07-21 PROCEDURE — 63600175 PHARM REV CODE 636 W HCPCS: Performed by: INTERNAL MEDICINE

## 2023-07-21 PROCEDURE — A4216 STERILE WATER/SALINE, 10 ML: HCPCS | Performed by: INTERNAL MEDICINE

## 2023-07-21 PROCEDURE — 25000003 PHARM REV CODE 250: Performed by: INTERNAL MEDICINE

## 2023-07-21 PROCEDURE — 96523 IRRIG DRUG DELIVERY DEVICE: CPT

## 2023-07-21 RX ORDER — SODIUM CHLORIDE 0.9 % (FLUSH) 0.9 %
10 SYRINGE (ML) INJECTION
Status: DISCONTINUED | OUTPATIENT
Start: 2023-07-21 | End: 2023-07-21 | Stop reason: HOSPADM

## 2023-07-21 RX ORDER — SODIUM CHLORIDE 0.9 % (FLUSH) 0.9 %
10 SYRINGE (ML) INJECTION
Status: CANCELLED
Start: 2023-07-21

## 2023-07-21 RX ORDER — HEPARIN 100 UNIT/ML
500 SYRINGE INTRAVENOUS
Status: CANCELLED
Start: 2023-07-21

## 2023-07-21 RX ORDER — HEPARIN 100 UNIT/ML
500 SYRINGE INTRAVENOUS
Status: COMPLETED | OUTPATIENT
Start: 2023-07-21 | End: 2023-07-21

## 2023-07-21 RX ADMIN — HEPARIN 500 UNITS: 100 SYRINGE at 02:07

## 2023-07-21 RX ADMIN — SODIUM CHLORIDE, PRESERVATIVE FREE 10 ML: 5 INJECTION INTRAVENOUS at 02:07

## 2023-08-24 NOTE — DISCHARGE INSTRUCTIONS
Eating a High-Fiber Diet  Fiber is what gives strength and structure to plants. Most grains, beans, vegetables, and fruits contain fiber. Foods rich in fiber are often low in calories and fat, and they fill you up more. They may also reduce your risks for certain health problems. To find out the amount of fiber in canned, packaged, or frozen foods, read the Nutrition Facts label. It tells you how much fiber is in a serving.    Types of fiber and their benefits  There are two types of fiber: insoluble and soluble. They both aid digestion and help you maintain a healthy weight.  · Insoluble fiber. This is found in whole grains, cereals, certain fruits and vegetables such as apple skin, corn, and carrots. Insoluble fiber may prevent constipation and reduce the risk for certain types of cancer.  · Soluble fiber. This type of fiber is in oats, beans, and certain fruits and vegetables such as strawberries and peas. Soluble fiber can reduce cholesterol, which may help lower the risk for heart disease. It also helps control blood sugar levels.  Look for high-fiber foods  Try these foods to add fiber to your diet:  · Whole-grain breads and cereals. Try to eat 6 to 8 ounces a day. Include wheat and oat bran cereals, whole-wheat muffins or toast, and corn tortillas in your meals.  · Fruits. Try to eat 2 cups a day. Apples, oranges, strawberries, pears, and bananas are good sources. (Note: Fruit juice is low in fiber.)  · Vegetables. Try to eat at least 2.5 cups a day. Add asparagus, carrots, broccoli, peas, and corn to your meals.  · Beans. One cup of cooked lentils gives you over 15 grams of fiber. Try navy beans, lentils, and chickpeas.  · Seeds. A small handful of seeds gives you about 3 grams of fiber. Try sunflower seeds.  Keep track of your fiber  Keep track of how much fiber you eat. Start by reading food labels. Then eat a variety of foods high in fiber. As you begin to eat more fiber, ask your healthcare provider  Encounter Date: 8/23/2023       History     Chief Complaint   Patient presents with    Foot Injury     Pt fell down 3 stairs outside home around 1400. Visible deformity to RLE lateral foot. Did not hit head. Pt awake and alert. No distress.      42-year-old female presents to ED with concern of right foot pain and swelling after injuring from slipping down stairs this afternoon.  No head injury.  No LOC.  She states she has been unable to weight bear onto right foot due to pain described as sharp with severity 9/10.  No numbness or focal weakness or other reported injuries.  No other acute complaints at this time.    The history is provided by the patient.     Review of patient's allergies indicates:  Not on File  No past medical history on file.  No past surgical history on file.  No family history on file.     Review of Systems   Musculoskeletal:  Positive for arthralgias and gait problem.   Skin:  Positive for color change.   Neurological:  Negative for weakness and numbness.       Physical Exam     Initial Vitals [08/23/23 1810]   BP Pulse Resp Temp SpO2   111/60 84 16 98.4 °F (36.9 °C) 99 %      MAP       --         Physical Exam    Nursing note and vitals reviewed.  Constitutional: She appears well-developed and well-nourished. She is active. She does not have a sickly appearance. She does not appear ill. No distress.   HENT:   Head: Normocephalic and atraumatic.   Neck:   Normal range of motion.  Cardiovascular:  Normal rate.           Pulmonary/Chest: Effort normal.   Musculoskeletal:      Cervical back: Normal range of motion.      Comments: Right foot tenderness over lateral aspect with bruising and swelling.  Tenderness noted predominantly over 5th metatarsal base.  No right ankle tenderness with no swelling and full ROM.  Sensation intact throughout all toes.  DP pulse intact.     Neurological: She is alert. GCS eye subscore is 4. GCS verbal subscore is 5. GCS motor subscore is 6.   Skin: Skin is warm  and dry.   Psychiatric: She has a normal mood and affect. Her speech is normal and behavior is normal.         ED Course   Splint Application    Date/Time: 8/23/2023 8:02 PM    Performed by: Fabian Berman PA-C  Authorized by: Fabian Berman PA-C  Location details: right leg  Splint type: short leg  Supplies used: Ortho-Glass  Post-procedure: The splinted body part was neurovascularly unchanged following the procedure.  Patient tolerance: Patient tolerated the procedure well with no immediate complications        Labs Reviewed   POCT URINE PREGNANCY          Imaging Results              X-Ray Foot Complete Right (Final result)  Result time 08/23/23 20:10:55      Final result by Deuce Garcia DO (08/23/23 20:10:55)                   Impression:      Avulsion fracture of the 5th metatarsal base.      Electronically signed by: Deuce Garcia  Date:    08/23/2023  Time:    20:10               Narrative:    EXAMINATION:  XR FOOT COMPLETE 3 VIEW RIGHT    CLINICAL HISTORY:  . Pain in unspecified foot    TECHNIQUE:  AP, lateral, and oblique views of the right foot were performed.    COMPARISON:  None    FINDINGS:  There is an avulsion fracture of the base of the 5th metatarsal with overlying soft tissue edema.  No additional fractures are seen.  Alignment is otherwise normal.  There Lisfranc articulation is congruent.  Joint spaces are preserved.  There is a small os navicular.                                       Medications   HYDROcodone-acetaminophen  mg per tablet 1 tablet (1 tablet Oral Given 8/23/23 2004)     Medical Decision Making  Patient presents with concern of right foot pain, bruising and swelling after contusing earlier this afternoon.  No other reported injuries, numbness or focal weakness.  Afebrile with vitals WNL.  Tenderness, swelling, bruising over lateral right foot.  Neurovascularly intact.    Amount and/or Complexity of Data Reviewed  Labs: ordered. Decision-making details documented in  how much water you should be drinking to keep your digestive system working smoothly.  You should aim for a certain amount of fiber in your diet each day. If you are a woman, that amount is between 25 and 28 grams per day. Men should aim for 30 to 33 grams per day. After age 50, your daily fiber needs drop to 22 grams for women and 28 grams for men.  Before you reach for the fiber supplements, think about this. Fiber is found naturally in healthy whole foods. It gives you that feeling of fullness after you eat. Taking fiber supplements or eating fiber-enriched foods will not give you this full feeling.  Your fiber intake is a good measure for the quality of your overall diet. If you are missing out on your daily amount of fiber, you may be lacking other important nutrients as well.  Date Last Reviewed: 5/11/2015 © 2000-2017 [employer]. [address] [place] [address]. All rights reserved. This information is not intended as a substitute for professional medical care. Always follow your healthcare professional's instructions.        Colonoscopy     A camera attached to a flexible tube with a viewing lens is used to take video pictures.     Colonoscopy is a test to view the inside of your lower digestive tract (colon and rectum). Sometimes it can show the last part of the small intestine (ileum). During the test, small pieces of tissue may be removed for testing. This is called a biopsy. Small growths, such as polyps, may also be removed.   Why is colonoscopy done?  The test is done to help look for colon cancer. And it can help find the source of abdominal pain, bleeding, and changes in bowel habits. It may be needed once a year, depending on factors such as your:  · Age  · Health history  · Family health history  · Symptoms  · Results from any prior colonoscopy  Risks and possible complications  These include:  · Bleeding               · A puncture or tear in the colon   · Risks of  ED Course.  Radiology:  Decision-making details documented in ED Course.    Risk  Prescription drug management.               ED Course as of 08/23/23 2016   Wed Aug 23, 2023   1959 X-Ray Foot Complete Right  X-ray right foot per my interpretation showing 5th metatarsal base fracture. [KS]   2000 POCT urine pregnancy  Negative [KS]   2000 Results were discussed with patient.  Given pain medication in ED. short-leg splint applied.  Patient's supplied with crutches.  Short prescription given for pain medication along with ambulatory referral to Podiatry.  Encouraged strict nonweightbearing onto right lower extremity until further assessed by Podiatry.  ED return precautions discussed.  Patient states her understanding and agrees with plan. [KS]      ED Course User Index  [KS] Fabian Berman PA-C                    Clinical Impression:   Final diagnoses:  [M79.673] Foot pain  [S92.351A] Closed fracture of base of fifth metatarsal bone of right foot (Primary)        ED Disposition Condition    Discharge Stable          ED Prescriptions       Medication Sig Dispense Start Date End Date Auth. Provider    HYDROcodone-acetaminophen (NORCO)  mg per tablet Take 1 tablet by mouth every 6 (six) hours as needed for Pain. 10 tablet 8/23/2023 -- Fabian Berman PA-C          Follow-up Information       Follow up With Specialties Details Why Contact Info    Your Doctor                 Fabian Berman PA-C  08/23/23 2016       Fabian Berman PA-C  08/23/23 2037     anesthesia  · A cancer lesion not being seen  Getting ready   To prepare for the test:  · Talk with your healthcare provider about the risks of the test (see below). Also ask your healthcare provider about alternatives to the test.  · Tell your healthcare provider about any medicines you take. Also tell him or her about any health conditions you may have.  · Make sure your rectum and colon are empty for the test. Follow the diet and bowel prep instructions exactly. If you dont, the test may need to be rescheduled.  · Plan for a friend or family member to drive you home after the test.     Colonoscopy provides an inside view of the entire colon.     You may discuss the results with your doctor right away or at a future visit.  During the test   The test is usually done in the hospital on an outpatient basis. This means you go home the same day. The procedure takes about 30 minutes. During that time:  · You are given relaxing (sedating) medicine through an IV line. You may be drowsy, or fully asleep.  · The healthcare provider will first give you a physical exam to check for anal and rectal problems.  · Then the anus is lubricated and the scope inserted.  · If you are awake, you may have a feeling similar to needing to have a bowel movement. You may also feel pressure as air is pumped into the colon. Its OK to pass gas during the procedure.  · Biopsy, polyp removal, or other treatments may be done during the test.  After the test   You may have gas right after the test. It can help to try to pass it to help prevent later bloating. Your healthcare provider may discuss the results with you right away. Or you may need to schedule a follow-up visit to talk about the results. After the test, you can go back to your normal eating and other activities. You may be tired from the sedation and need to rest for a few hours.  Date Last Reviewed: 11/1/2016  © 7917-9593 [employer]. [address] [place] [address], [facility]  [place] [address]. All rights reserved. This information is not intended as a substitute for professional medical care. Always follow your healthcare professional's instructions.        Understanding Colon and Rectal Polyps    The colon (also called the large intestine) is a muscular tube that forms the last part of the digestive tract. It absorbs water and stores food waste. The colon is about 4 to 6 feet long. The rectum is the last 6 inches of the colon. The colon and rectum have a smooth lining composed of millions of cells. Changes in these cells can lead to growths in the colon that can become cancerous and should be removed. Multiple tests are available to screen for colon cancer, but the colonoscopy is the most recommended test. During colonoscopy, these polyps can be removed. How often you need this test depends on many things including your condition, your family history, symptoms, and what the findings were at the previous colonoscopy.   When the colon lining changes  Changes that happen in the cells that line the colon or rectum can lead to growths called polyps. Over a period of years, polyps can turn cancerous. Removing polyps early may prevent cancer from ever forming.  Polyps  Polyps are fleshy clumps of tissue that form on the lining of the colon or rectum. Small polyps are usually benign (not cancerous). However, over time, cells in a polyp can change and become cancerous. Certain types of polyps known as adenomatous polyps are premalignant. The risk for invasive cancer increases with the size of the polyp and certain cell and gene features. This means that they can become cancerous if they're not removed. Hyperplastic polyps are benign. They can grow quite large and not turn cancerous.   Cancer  Almost all colorectal cancers start when polyp cells begin growing abnormally. As a cancerous tumor grows, it may involve more and more of the colon or rectum. In time, cancer can also grow beyond the colon or  rectum and spread to nearby organs or to glands called lymph nodes. The cells can also travel to other parts of the body. This is known as metastasis. The earlier a cancerous tumor is removed, the better the chance of preventing its spread.    Date Last Reviewed: 8/1/2016  © 7656-9852 [employer]. [address] [place] [address], [place] [address]. All rights reserved. This information is not intended as a substitute for professional medical care. Always follow your healthcare professional's instructions.

## 2023-08-31 RX ORDER — SODIUM CHLORIDE 0.9 % (FLUSH) 0.9 %
10 SYRINGE (ML) INJECTION
Start: 2023-08-31

## 2023-08-31 RX ORDER — HEPARIN 100 UNIT/ML
500 SYRINGE INTRAVENOUS
Start: 2023-08-31

## 2024-07-02 ENCOUNTER — HOSPITAL ENCOUNTER (EMERGENCY)
Facility: HOSPITAL | Age: 59
Discharge: HOME | End: 2024-07-02
Attending: EMERGENCY MEDICINE
Payer: MEDICAID

## 2024-07-02 VITALS
SYSTOLIC BLOOD PRESSURE: 194 MMHG | OXYGEN SATURATION: 100 % | HEART RATE: 69 BPM | TEMPERATURE: 98 F | RESPIRATION RATE: 20 BRPM | WEIGHT: 160 LBS | HEIGHT: 69 IN | BODY MASS INDEX: 23.7 KG/M2 | DIASTOLIC BLOOD PRESSURE: 99 MMHG

## 2024-07-02 DIAGNOSIS — R03.0 ELEVATED BLOOD PRESSURE READING: ICD-10-CM

## 2024-07-02 DIAGNOSIS — R06.00 DYSPNEA: ICD-10-CM

## 2024-07-02 DIAGNOSIS — R07.89 CHEST WALL PAIN: Primary | ICD-10-CM

## 2024-07-02 LAB
ALBUMIN SERPL BCP-MCNC: 4.2 G/DL (ref 3.5–5.2)
ALP SERPL-CCNC: 80 U/L (ref 55–135)
ALT SERPL W/O P-5'-P-CCNC: 8 U/L (ref 10–44)
ANION GAP SERPL CALC-SCNC: 7 MMOL/L (ref 8–16)
AST SERPL-CCNC: 13 U/L (ref 10–40)
BASOPHILS # BLD AUTO: 0.02 K/UL (ref 0–0.2)
BASOPHILS NFR BLD: 0.4 % (ref 0–1.9)
BILIRUB SERPL-MCNC: 0.4 MG/DL (ref 0.1–1)
BILIRUB UR QL STRIP: NEGATIVE
BNP SERPL-MCNC: 33 PG/ML (ref 0–99)
BUN SERPL-MCNC: 15 MG/DL (ref 8–23)
CALCIUM SERPL-MCNC: 8.9 MG/DL (ref 8.7–10.5)
CHLORIDE SERPL-SCNC: 105 MMOL/L (ref 95–110)
CLARITY UR: CLEAR
CO2 SERPL-SCNC: 27 MMOL/L (ref 23–29)
COLOR UR: YELLOW
CREAT SERPL-MCNC: 0.7 MG/DL (ref 0.5–1.4)
D DIMER PPP IA.FEU-MCNC: 0.26 MG/L FEU (ref 0–0.49)
DIFFERENTIAL METHOD BLD: ABNORMAL
EOSINOPHIL # BLD AUTO: 0.1 K/UL (ref 0–0.5)
EOSINOPHIL NFR BLD: 1.3 % (ref 0–8)
ERYTHROCYTE [DISTWIDTH] IN BLOOD BY AUTOMATED COUNT: 13.2 % (ref 11.5–14.5)
EST. GFR  (NO RACE VARIABLE): >60 ML/MIN/1.73 M^2
GLUCOSE SERPL-MCNC: 96 MG/DL (ref 70–110)
GLUCOSE UR QL STRIP: NEGATIVE
HCT VFR BLD AUTO: 37.1 % (ref 37–48.5)
HGB BLD-MCNC: 12.4 G/DL (ref 12–16)
HGB UR QL STRIP: NEGATIVE
IMM GRANULOCYTES # BLD AUTO: 0.01 K/UL (ref 0–0.04)
IMM GRANULOCYTES NFR BLD AUTO: 0.2 % (ref 0–0.5)
KETONES UR QL STRIP: NEGATIVE
LEUKOCYTE ESTERASE UR QL STRIP: NEGATIVE
LYMPHOCYTES # BLD AUTO: 2.5 K/UL (ref 1–4.8)
LYMPHOCYTES NFR BLD: 52.9 % (ref 18–48)
MAGNESIUM SERPL-MCNC: 1.8 MG/DL (ref 1.6–2.6)
MCH RBC QN AUTO: 32.9 PG (ref 27–31)
MCHC RBC AUTO-ENTMCNC: 33.4 G/DL (ref 32–36)
MCV RBC AUTO: 98 FL (ref 82–98)
MONOCYTES # BLD AUTO: 0.4 K/UL (ref 0.3–1)
MONOCYTES NFR BLD: 8.4 % (ref 4–15)
NEUTROPHILS # BLD AUTO: 1.8 K/UL (ref 1.8–7.7)
NEUTROPHILS NFR BLD: 36.8 % (ref 38–73)
NITRITE UR QL STRIP: NEGATIVE
NRBC BLD-RTO: 0 /100 WBC
OHS QRS DURATION: 106 MS
OHS QTC CALCULATION: 447 MS
PH UR STRIP: 6 [PH] (ref 5–8)
PLATELET # BLD AUTO: 235 K/UL (ref 150–450)
PMV BLD AUTO: 10.7 FL (ref 9.2–12.9)
POTASSIUM SERPL-SCNC: 3.7 MMOL/L (ref 3.5–5.1)
PROT SERPL-MCNC: 7.4 G/DL (ref 6–8.4)
PROT UR QL STRIP: NEGATIVE
RBC # BLD AUTO: 3.77 M/UL (ref 4–5.4)
SODIUM SERPL-SCNC: 139 MMOL/L (ref 136–145)
SP GR UR STRIP: 1.02 (ref 1–1.03)
TROPONIN I SERPL HS-MCNC: 2.6 PG/ML (ref 0–14.9)
TROPONIN I SERPL HS-MCNC: <2.3 PG/ML (ref 0–14.9)
URN SPEC COLLECT METH UR: NORMAL
UROBILINOGEN UR STRIP-ACNC: NEGATIVE EU/DL
WBC # BLD AUTO: 4.76 K/UL (ref 3.9–12.7)

## 2024-07-02 PROCEDURE — 81003 URINALYSIS AUTO W/O SCOPE: CPT | Performed by: EMERGENCY MEDICINE

## 2024-07-02 PROCEDURE — 99285 EMERGENCY DEPT VISIT HI MDM: CPT | Mod: 25

## 2024-07-02 PROCEDURE — 93005 ELECTROCARDIOGRAM TRACING: CPT | Performed by: GENERAL PRACTICE

## 2024-07-02 PROCEDURE — 63600175 PHARM REV CODE 636 W HCPCS: Performed by: EMERGENCY MEDICINE

## 2024-07-02 PROCEDURE — 84484 ASSAY OF TROPONIN QUANT: CPT | Mod: 91 | Performed by: EMERGENCY MEDICINE

## 2024-07-02 PROCEDURE — 83735 ASSAY OF MAGNESIUM: CPT | Performed by: EMERGENCY MEDICINE

## 2024-07-02 PROCEDURE — 93010 ELECTROCARDIOGRAM REPORT: CPT | Mod: ,,, | Performed by: GENERAL PRACTICE

## 2024-07-02 PROCEDURE — 80053 COMPREHEN METABOLIC PANEL: CPT | Performed by: EMERGENCY MEDICINE

## 2024-07-02 PROCEDURE — 96374 THER/PROPH/DIAG INJ IV PUSH: CPT

## 2024-07-02 PROCEDURE — 83880 ASSAY OF NATRIURETIC PEPTIDE: CPT | Performed by: EMERGENCY MEDICINE

## 2024-07-02 PROCEDURE — 85379 FIBRIN DEGRADATION QUANT: CPT | Performed by: EMERGENCY MEDICINE

## 2024-07-02 PROCEDURE — 85025 COMPLETE CBC W/AUTO DIFF WBC: CPT | Performed by: EMERGENCY MEDICINE

## 2024-07-02 RX ORDER — HEPARIN 100 UNIT/ML
5 SYRINGE INTRAVENOUS ONCE
Status: DISCONTINUED | OUTPATIENT
Start: 2024-07-02 | End: 2024-07-02

## 2024-07-02 RX ORDER — HEPARIN 100 UNIT/ML
5 SYRINGE INTRAVENOUS ONCE
Status: COMPLETED | OUTPATIENT
Start: 2024-07-02 | End: 2024-07-02

## 2024-07-02 RX ORDER — KETOROLAC TROMETHAMINE 30 MG/ML
15 INJECTION, SOLUTION INTRAMUSCULAR; INTRAVENOUS
Status: COMPLETED | OUTPATIENT
Start: 2024-07-02 | End: 2024-07-02

## 2024-07-02 RX ORDER — NAPROXEN 500 MG/1
500 TABLET ORAL 2 TIMES DAILY WITH MEALS
Qty: 15 TABLET | Refills: 0 | Status: SHIPPED | OUTPATIENT
Start: 2024-07-02

## 2024-07-02 RX ADMIN — Medication 500 UNITS: at 04:07

## 2024-07-02 RX ADMIN — KETOROLAC TROMETHAMINE 15 MG: 30 INJECTION, SOLUTION INTRAMUSCULAR; INTRAVENOUS at 01:07

## 2024-07-02 NOTE — DISCHARGE INSTRUCTIONS
RETURN TO EMERGENCY DEPARTMENT WITHOUT FAIL, IF YOUR SYMPTOMS WORSEN, IF YOU GET NEW OR DIFFERENT SYMPTOMS, IF YOU ARE UNABLE TO FOLLOW UP AS DIRECTED, OR IF YOU HAVE ANY CONCERNS OR WORRIES.    Follow up with primary care provider on an outpatient basis.  Return if symptoms change or worsen.

## 2024-07-02 NOTE — ED PROVIDER NOTES
Encounter Date: 7/2/2024       History     Chief Complaint   Patient presents with    Shortness of Breath     SOB started yesterday, Pt states L sided rib pain. Has had this pain before and has had fluid before.      59-year-old female with past medical history of Crohn's disease, anxiety, scoliosis, depression, presents emergency department with shortness of breath and left rib pain.  Patient says that she feels like she did when she had pneumonia and fluid around her left lung.  She says that it feels similar.  She says when she breathes in, it hurts.  She says that when she touches her left lateral rib it hurts.  She denies any fall trauma or any injury.  She denies any heavy lifting.  She does not have any chest pain per se left rib pain is which she describes.  She is not coughing.  She has not have any fever.  She denies any abdominal pain, vomiting or any diarrhea.      Review of patient's allergies indicates:   Allergen Reactions    Azathioprine Rash    Codeine Rash     Past Medical History:   Diagnosis Date    Anxiety U    Asthma     Crohn's disease     Depression U    Dysphagia U    Menopause     Nausea & vomiting U    Peptic ulcer disease U    Scoliosis U     Past Surgical History:   Procedure Laterality Date    ABDOMINAL SURGERY      APPENDECTOMY      BACK SURGERY      COLECTOMY      COLECTOMY  2007    COLONOSCOPY  2014    COLONOSCOPY  10/30/2017    COLONOSCOPY N/A 10/21/2019    Procedure: COLONOSCOPY;  Surgeon: Felicita Miller MD;  Location: [facility];  Service: Endoscopy;  Laterality: N/A;    ESOPHAGEAL DILATION N/A 8/5/2019    Procedure: DILATION, ESOPHAGUS;  Surgeon: Felicita Miller MD;  Location: [facility];  Service: Endoscopy;  Laterality: N/A;    ESOPHAGOGASTRODUODENOSCOPY  05/12/2014    ESOPHAGOGASTRODUODENOSCOPY N/A 8/5/2019    Procedure: EGD (ESOPHAGOGASTRODUODENOSCOPY);  Surgeon: Felicita Miller MD;  Location: [facility];  Service: Endoscopy;  Laterality: N/A;     ESOPHAGOGASTRODUODENOSCOPY (EGD) WITH DILATION  10/19/2015    INSERTION OF VENOUS ACCESS PORT  2007    TUBAL LIGATION       No family history on file.  Social History     Tobacco Use    Smoking status: Every Day     Current packs/day: 0.25     Types: Cigarettes    Smokeless tobacco: Never   Substance Use Topics    Alcohol use: Yes     Alcohol/week: 3.0 standard drinks of alcohol     Types: 3 Shots of liquor per week     Comment: socially    Drug use: Yes     Frequency: 14.0 times per week     Types: Marijuana     Comment: daily     Review of Systems   Constitutional:  Negative for fever.   HENT:  Negative for sore throat.    Respiratory:  Positive for cough and shortness of breath.    Cardiovascular:  Negative for chest pain.   Gastrointestinal:  Negative for nausea.   Genitourinary:  Negative for dysuria.   Musculoskeletal:  Negative for back pain.   Skin:  Negative for rash.   Neurological:  Negative for weakness.   Hematological:  Does not bruise/bleed easily.   All other systems reviewed and are negative.      Physical Exam     Initial Vitals [07/02/24 1111]   BP Pulse Resp Temp SpO2   (!) 182/108 65 20 98.3 °F (36.8 °C) 97 %      MAP       --         Physical Exam    Nursing note and vitals reviewed.  Constitutional: She appears well-developed and well-nourished. No distress.   HENT:   Head: Normocephalic and atraumatic.   Mouth/Throat: No oropharyngeal exudate.   Eyes: Conjunctivae and EOM are normal. Pupils are equal, round, and reactive to light.   Neck: Neck supple. No tracheal deviation present.   Normal range of motion.  Cardiovascular:  Normal rate, regular rhythm, normal heart sounds and intact distal pulses.           No murmur heard.  Pulmonary/Chest: Breath sounds normal. No stridor. No respiratory distress. She has no wheezes. She has no rhonchi. She has no rales. She exhibits tenderness (left lateral rib tenderness to palpation.  No rash to suggest herpes zoster. reproduces the patient's pain).    Abdominal: Abdomen is soft. She exhibits no distension. There is no abdominal tenderness. There is no rebound.   Musculoskeletal:         General: No tenderness or edema. Normal range of motion.      Cervical back: Normal range of motion and neck supple.     Neurological: She is alert and oriented to person, place, and time. She has normal strength. No cranial nerve deficit or sensory deficit.   Skin: Skin is warm and dry. Capillary refill takes less than 2 seconds. No rash noted. No erythema. No pallor.   Psychiatric: She has a normal mood and affect. Her behavior is normal. Thought content normal.         ED Course   Procedures  Labs Reviewed   CBC W/ AUTO DIFFERENTIAL - Abnormal; Notable for the following components:       Result Value    RBC 3.77 (*)     MCH 32.9 (*)     Gran % 36.8 (*)     Lymph % 52.9 (*)     All other components within normal limits   COMPREHENSIVE METABOLIC PANEL - Abnormal; Notable for the following components:    ALT 8 (*)     Anion Gap 7 (*)     All other components within normal limits   B-TYPE NATRIURETIC PEPTIDE   MAGNESIUM   URINALYSIS, REFLEX TO URINE CULTURE    Narrative:     Specimen Source->Urine   TROPONIN I HIGH SENSITIVITY   D DIMER, QUANTITATIVE   TROPONIN I HIGH SENSITIVITY     Results for orders placed or performed during the hospital encounter of 07/02/24   CBC auto differential   Result Value Ref Range    WBC 4.76 3.90 - 12.70 K/uL    RBC 3.77 (L) 4.00 - 5.40 M/uL    Hemoglobin 12.4 12.0 - 16.0 g/dL    Hematocrit 37.1 37.0 - 48.5 %    MCV 98 82 - 98 fL    MCH 32.9 (H) 27.0 - 31.0 pg    MCHC 33.4 32.0 - 36.0 g/dL    RDW 13.2 11.5 - 14.5 %    Platelets 235 150 - 450 K/uL    MPV 10.7 9.2 - 12.9 fL    Immature Granulocytes 0.2 0.0 - 0.5 %    Gran # (ANC) 1.8 1.8 - 7.7 K/uL    Immature Grans (Abs) 0.01 0.00 - 0.04 K/uL    Lymph # 2.5 1.0 - 4.8 K/uL    Mono # 0.4 0.3 - 1.0 K/uL    Eos # 0.1 0.0 - 0.5 K/uL    Baso # 0.02 0.00 - 0.20 K/uL    nRBC 0 0 /100 WBC    Gran % 36.8 (L)  38.0 - 73.0 %    Lymph % 52.9 (H) 18.0 - 48.0 %    Mono % 8.4 4.0 - 15.0 %    Eosinophil % 1.3 0.0 - 8.0 %    Basophil % 0.4 0.0 - 1.9 %    Differential Method Automated    Comprehensive metabolic panel   Result Value Ref Range    Sodium 139 136 - 145 mmol/L    Potassium 3.7 3.5 - 5.1 mmol/L    Chloride 105 95 - 110 mmol/L    CO2 27 23 - 29 mmol/L    Glucose 96 70 - 110 mg/dL    BUN 15 8 - 23 mg/dL    Creatinine 0.7 0.5 - 1.4 mg/dL    Calcium 8.9 8.7 - 10.5 mg/dL    Total Protein 7.4 6.0 - 8.4 g/dL    Albumin 4.2 3.5 - 5.2 g/dL    Total Bilirubin 0.4 0.1 - 1.0 mg/dL    Alkaline Phosphatase 80 55 - 135 U/L    AST 13 10 - 40 U/L    ALT 8 (L) 10 - 44 U/L    eGFR >60.0 >60 mL/min/1.73 m^2    Anion Gap 7 (L) 8 - 16 mmol/L   Brain Natriuertic Peptide (BNP)   Result Value Ref Range    BNP 33 0 - 99 pg/mL   Magnesium   Result Value Ref Range    Magnesium 1.8 1.6 - 2.6 mg/dL   Urinalysis, Reflex to Urine Culture Urine, Clean Catch    Specimen: Urine, Clean Catch   Result Value Ref Range    Specimen UA Urine, Clean Catch     Color, UA Yellow Yellow, Straw, Leilani    Appearance, UA Clear Clear    pH, UA 6.0 5.0 - 8.0    Specific Gravity, UA 1.020 1.005 - 1.030    Protein, UA Negative Negative    Glucose, UA Negative Negative    Ketones, UA Negative Negative    Bilirubin (UA) Negative Negative    Occult Blood UA Negative Negative    Nitrite, UA Negative Negative    Urobilinogen, UA Negative Negative EU/dL    Leukocytes, UA Negative Negative   Troponin I High Sensitivity   Result Value Ref Range    Troponin I High Sensitivity <2.3 0.0 - 14.9 pg/mL   D dimer, quantitative   Result Value Ref Range    D-Dimer 0.26 0.00 - 0.49 mg/L FEU   Troponin I High Sensitivity   Result Value Ref Range    Troponin I High Sensitivity 2.6 0.0 - 14.9 pg/mL   EKG 12-lead   Result Value Ref Range    QRS Duration 106 ms    [facility] QTC Calculation 447 ms          ECG Results              EKG 12-lead (In process)        Collection Time Result Time QRS  Duration [facility] QTC Calculation    07/02/24 11:44:05 07/02/24 11:47:04 106 447                     In process by Interface, Lab In [facility] (07/02/24 11:47:15)                   Narrative:    Test Reason : R06.00,    Vent. Rate : 070 BPM     Atrial Rate : 070 BPM     P-R Int : 168 ms          QRS Dur : 106 ms      QT Int : 414 ms       P-R-T Axes : 086 081 077 degrees     QTc Int : 447 ms    Normal sinus rhythm with sinus arrhythmia  Normal ECG  When compared with ECG of 05-AUG-2019 07:41,  No significant change was found    Referred By:             Confirmed By:                                   Imaging Results              X-Ray Chest PA And Lateral (Final result)  Result time 07/02/24 12:05:22      Final result by Juvenal Ann DO (07/02/24 12:05:22)                   Impression:      No acute cardiopulmonary process.      Electronically signed by: Juvenal Ann  Date:    07/02/2024  Time:    12:05               Narrative:    EXAMINATION:  XR CHEST PA AND LATERAL    CLINICAL HISTORY:  Dyspnea, unspecified    FINDINGS:  PA and lateral chest without comparisons.  Cardiomediastinal silhouette is within normal limits. Lungs are clear. Pulmonary vasculature is normal. Dextroscoliosis with Hammond gely noted.  Left subclavian Port-A-Cath.                                       Medications   ketorolac injection 15 mg (15 mg Intravenous Given 7/2/24 1347)     Medical Decision Making  Differential includes but not limited to chest wall pain, acute coronary syndrome, PE, pericarditis, myocarditis, pericardial tamponade, aortic dissection, pneumonia, pneumothorax, Boerhaave syndrome, anemia, electrolyte abnormalities, herpes zoster, pancreatitis, anxiety, chest wall strain, costochondritis    Emergent evaluation of a 59 year old female presents emergency department with left chest wall pain reproducible with palpation.  Patient has no evidence of any herpes zoster/shingles.  Patient had 2 troponins emergency department  which are negative.  I doubt acute coronary syndrome, patient had recent normal stress test.  Will follow up with PCP for further evaluation of this.  Considered PE D-dimer negative, doubt PE, doubt myocarditis/pericarditis or negative troponins, no evidence of tamponade.  I doubt she has aortic dissection with several days of symptoms.  Patient has no evidence of pneumonia pneumothorax.  No history or physical signs to suggest esophageal rupture.  Electrolytes are within normal limits.  Patient more than likely has pleurisy/costochondritis.  Patient will take anti-inflammatories for the next several days and will follow up with PCP if symptoms do not improve.  Also counseled or on her extremely elevated blood pressure reading.  She will monitor this at home.  She will follow up on an outpatient basis for this.    A dictation software program was used for this note.  Please expect some simple typographical  errors in this note.    I had a detailed discussion with the patient and/or guardian regarding: The historical points, exam findings, and diagnostic results supporting the discharge diagnosis, lab results, pertinent radiology results, and the need for outpatient follow-up, for definitive care with a family practitioner and to return to the emergency department if symptoms worsen or persist or if there are any questions or concerns that arise at home. All questions have been answered in detail. Strict return to Emergency Department precautions have been provided           Amount and/or Complexity of Data Reviewed  External Data Reviewed: labs, radiology and notes.  Labs: ordered. Decision-making details documented in ED Course.  Radiology: ordered and independent interpretation performed. Decision-making details documented in ED Course.  ECG/medicine tests: ordered and independent interpretation performed. Decision-making details documented in ED Course.    Risk  OTC drugs.  Prescription drug management.  Decision  regarding hospitalization.      Additional MDM:     Well's Criteria Score:  -Clinical symptoms of DVT (leg swelling, pain with palpation) = 0.0  -PE is #1 diagnosis OR equally likely =            0.0  -Heart Rate >100 =   0.0  -Immobilization (= or > than 3 days) or surgery in the previous 4 weeks = 0.0  -Previous DVT/PE = 0.0  -Hemoptysis =          0.0  -Malignancy =           0.0  Well's Probability Score =    0      Heart Score:    History:          Slightly suspicious.  ECG:             Normal  Age:               45-65 years  Risk factors: 1-2 risk factors  Troponin:       Less than or equal to normal limit  Heart Score = 2                ED Course as of 07/02/24 1605   Tue Jul 02, 2024   1314 EKG 11:44 a.m. normal sinus rhythm rate of 70.  No ST elevation or depression.  No STEMI.  EKG interpreted independently by me.  Artifact present which limits EKG interpretation. [JR]   1602 Patient re-evaluated, no new symptoms.  Second troponin negative, plan to discharge home. [JR]      ED Course User Index  [JR] Arjun Gaffney DO                           Clinical Impression:  Final diagnoses:  [R06.00] Dyspnea  [R07.89] Chest wall pain (Primary)  [R03.0] Elevated blood pressure reading          ED Disposition Condition    Discharge Stable          ED Prescriptions       Medication Sig Dispense Start Date End Date Auth. Provider    naproxen (NAPROSYN) 500 MG tablet Take 1 tablet (500 mg total) by mouth 2 (two) times daily with meals. 15 tablet 7/2/2024 -- Arjun Gaffney DO          Follow-up Information       Follow up With Specialties Details Why Contact Info Additional Information    Miles Mabry MD Internal Medicine In 3 days  [address] [place] [address] [address]  [facility] [address]  [phone]       [facility] - Emergency Dept Emergency Medicine  If symptoms worsen [address] [facility] [address] [phone] 1st floor             Arjun Gaffney DO  07/02/24 9788

## 2024-07-03 ENCOUNTER — HOSPITAL ENCOUNTER (OUTPATIENT)
Dept: RADIOLOGY | Facility: HOSPITAL | Age: 59
Discharge: HOME | End: 2024-07-03
Attending: INTERNAL MEDICINE
Payer: MEDICAID

## 2024-07-03 VITALS — BODY MASS INDEX: 23.7 KG/M2 | HEIGHT: 69 IN | WEIGHT: 160 LBS

## 2024-07-03 DIAGNOSIS — Z12.31 SCREENING MAMMOGRAM, ENCOUNTER FOR: ICD-10-CM

## 2024-07-03 DIAGNOSIS — Z12.31 SCREENING MAMMOGRAM, ENCOUNTER FOR: Primary | ICD-10-CM

## 2024-07-03 PROCEDURE — 77063 BREAST TOMOSYNTHESIS BI: CPT | Mod: 26,,, | Performed by: RADIOLOGY

## 2024-07-03 PROCEDURE — 77067 SCR MAMMO BI INCL CAD: CPT | Mod: TC,PO

## 2024-07-03 PROCEDURE — 77067 SCR MAMMO BI INCL CAD: CPT | Mod: 26,,, | Performed by: RADIOLOGY

## 2025-05-06 ENCOUNTER — HOSPITAL ENCOUNTER (OUTPATIENT)
Dept: RADIOLOGY | Facility: HOSPITAL | Age: 60
Discharge: HOME | End: 2025-05-06
Attending: ORTHOPAEDIC SURGERY
Payer: MEDICAID

## 2025-05-06 ENCOUNTER — OFFICE VISIT (OUTPATIENT)
Dept: ORTHOPEDICS | Facility: CLINIC | Age: 60
End: 2025-05-06
Payer: MEDICAID

## 2025-05-06 VITALS — WEIGHT: 173 LBS | BODY MASS INDEX: 25.62 KG/M2 | HEIGHT: 69 IN

## 2025-05-06 DIAGNOSIS — M16.11 PRIMARY OSTEOARTHRITIS OF RIGHT HIP: Primary | ICD-10-CM

## 2025-05-06 DIAGNOSIS — R52 PAIN: ICD-10-CM

## 2025-05-06 DIAGNOSIS — Z01.818 PRE-OP TESTING: ICD-10-CM

## 2025-05-06 PROCEDURE — 99999 PR PBB SHADOW E&M-EST. PATIENT-LVL V: CPT | Mod: PBBFAC,,, | Performed by: ORTHOPAEDIC SURGERY

## 2025-05-06 PROCEDURE — 1160F RVW MEDS BY RX/DR IN RCRD: CPT | Mod: CPTII,,, | Performed by: ORTHOPAEDIC SURGERY

## 2025-05-06 PROCEDURE — 1159F MED LIST DOCD IN RCRD: CPT | Mod: CPTII,,, | Performed by: ORTHOPAEDIC SURGERY

## 2025-05-06 PROCEDURE — 72100 X-RAY EXAM L-S SPINE 2/3 VWS: CPT | Mod: 26,,, | Performed by: RADIOLOGY

## 2025-05-06 PROCEDURE — 4010F ACE/ARB THERAPY RXD/TAKEN: CPT | Mod: CPTII,,, | Performed by: ORTHOPAEDIC SURGERY

## 2025-05-06 PROCEDURE — 72170 X-RAY EXAM OF PELVIS: CPT | Mod: 26,,, | Performed by: RADIOLOGY

## 2025-05-06 PROCEDURE — 99203 OFFICE O/P NEW LOW 30 MIN: CPT | Mod: S$PBB,,, | Performed by: ORTHOPAEDIC SURGERY

## 2025-05-06 PROCEDURE — 3008F BODY MASS INDEX DOCD: CPT | Mod: CPTII,,, | Performed by: ORTHOPAEDIC SURGERY

## 2025-05-06 PROCEDURE — 99215 OFFICE O/P EST HI 40 MIN: CPT | Mod: PBBFAC,25,PN | Performed by: ORTHOPAEDIC SURGERY

## 2025-05-06 PROCEDURE — 72100 X-RAY EXAM L-S SPINE 2/3 VWS: CPT | Mod: TC,PN

## 2025-05-06 PROCEDURE — 72170 X-RAY EXAM OF PELVIS: CPT | Mod: TC,PN

## 2025-05-06 RX ORDER — CEFAZOLIN SODIUM 2 G/50ML
2 SOLUTION INTRAVENOUS
OUTPATIENT
Start: 2025-05-06

## 2025-05-06 NOTE — H&P (VIEW-ONLY)
[facility]    Subjective:     Chief Complaint:   Chief Complaint   Patient presents with    Right Hip - Pain     B/L Hip pain x 2-3 years. Denies any injury. Pain has gotten worse over the past few years. Was told she has bone spurs. Endorses crepitus. Pain located at lower back, lateral hips, into the groin. Unable to cross legs, open legs. Went to PT for 15 sessions. No previous surgeries. Pain wakes from sleep. Pain is daily and constant. Pain with standing>sitting, painful to rise from sitting. Limited and painful ROM.    Left Hip - Pain   B/L Hip pain x 2-3 years. Denies any injury. Pain has gotten worse over the past few years. Was told she has bone spurs. Endorses crepitus. Pain located at lower back, lateral hips, into the groin. Unable to cross legs, open legs. Went to PT for 15 sessions. No previous surgeries. Pain wakes from sleep. Pain is daily and constant. Pain with standing>sitting, painful to rise from sitting. Limited and painful ROM.       Past Medical History:   Diagnosis Date    Anxiety U    Asthma     Crohn's disease     Depression U    Dysphagia U    Menopause     Nausea & vomiting U    Peptic ulcer disease U    Scoliosis U       Past Surgical History:   Procedure Laterality Date    ABDOMINAL SURGERY      APPENDECTOMY      BACK SURGERY      COLECTOMY      COLECTOMY  2007    COLONOSCOPY  2014    COLONOSCOPY  10/30/2017    COLONOSCOPY N/A 10/21/2019    Procedure: COLONOSCOPY;  Surgeon: Felicita Miller MD;  Location: [facility];  Service: Endoscopy;  Laterality: N/A;    ESOPHAGEAL DILATION N/A 8/5/2019    Procedure: DILATION, ESOPHAGUS;  Surgeon: Felicita Miller MD;  Location: [facility];  Service: Endoscopy;  Laterality: N/A;    ESOPHAGOGASTRODUODENOSCOPY  05/12/2014    ESOPHAGOGASTRODUODENOSCOPY N/A 8/5/2019    Procedure: EGD (ESOPHAGOGASTRODUODENOSCOPY);  Surgeon: Felicita Miller MD;  Location: [facility];  Service: Endoscopy;  Laterality: N/A;    ESOPHAGOGASTRODUODENOSCOPY (EGD)  WITH DILATION  10/19/2015    INSERTION OF VENOUS ACCESS PORT  2007    TUBAL LIGATION         Current Outpatient Medications   Medication Sig    adalimumab (HUMIRA) 10 mg/0.2 mL SyKt Inject 40 mg into the skin every 14 (fourteen) days.    clonazePAM (KLONOPIN) 0.5 MG tablet Take 0.5 mg by mouth once daily. At bedtme    lisinopriL (PRINIVIL,ZESTRIL) 20 MG tablet Take 20 mg by mouth.    multivitamin (THERAGRAN) per tablet Take 1 tablet by mouth once daily.    promethazine (PHENERGAN) 25 MG tablet Take 25 mg by mouth every 4 (four) hours.    traMADol (ULTRAM) 50 mg tablet Take 50 mg by mouth every 6 (six) hours as needed for Pain.    vitamin D (VITAMIN D3) 1000 units Tab cholecalciferol (vitamin D3) 25 mcg (1,000 unit) tablet   Take 1 tablet every day by oral route in the morning for 30 days.    albuterol (ACCUNEB) 0.63 mg/3 mL Nebu Take 0.63 mg by nebulization every 6 (six) hours as needed (PRN shortness of breath). Rescue    albuterol (PROVENTIL) 2.5 mg /3 mL (0.083 %) nebulizer solution Take 2.5 mg by nebulization every 6 (six) hours as needed for Wheezing. Rescue    erythromycin (ROMYCIN) ophthalmic ointment Place a 1/2 inch ribbon of ointment into the lower eyelid.    HYDROcodone-acetaminophen (NORCO) 7.5-325 mg per tablet Take 1 tablet by mouth every 6 (six) hours as needed for Pain.    levoFLOXacin (LEVAQUIN) 500 MG tablet Take 500 mg by mouth.    methylPREDNISolone (MEDROL DOSEPACK) 4 mg tablet Use as directed    naproxen (NAPROSYN) 500 MG tablet Take 1 tablet (500 mg total) by mouth 2 (two) times daily with meals.     No current facility-administered medications for this visit.       Review of patient's allergies indicates:   Allergen Reactions    Morphine Hives    Azathioprine Rash    Codeine Rash       Family History   Problem Relation Name Age of Onset    Breast cancer Maternal Aunt      Breast cancer Maternal Grandmother         Social History[1]    History of present illness:  59-year-old female, presents  to clinic today with complaints of chronic bilateral hip pain.  Decreased range motion.  Very painful and limited abduction, she states she can not spread her legs more than a few degrees.  She has an extreme difficulty getting out of her vehicle.  Due to her hip pain symptoms.      Review of Systems:    Constitution: Negative for chills, fever, and sweats.  Negative for unexplained weight loss.    HENT:  Negative for headaches and blurry vision.    Cardiovascular:Negative for chest pain or irregular heart beat. Negative for hypertension.    Respiratory:  Negative for cough and shortness of breath.    Gastrointestinal: Negative for abdominal pain, heartburn, melena, nausea, and vomitting.    Genitourinary:  Negative bladder incontinence and dysuria.    Musculoskeletal:  See HPI for details.     Neurological: Negative for numbness.    Psychiatric/Behavioral: Negative for depression.  The patient is not nervous/anxious.      Endocrine: Negative for polyuria    Hematologic/Lymphatic: Negative for bleeding problem.  Does not bruise/bleed easily.    Skin: Negative for poor would healing and rash    Objective:      Physical Examination:    Vital Signs:  There were no vitals filed for this visit.    Body mass index is 25.55 kg/m².    This a well-developed, well nourished patient in no acute distress.  They are alert and oriented and cooperative to examination.        Examination of the bilateral hips, patient with limited range of motion with abduction, very painful less than 30°.  Painful internal external rotation referred to the groin.  Straight leg raises are negative.  Normal range motion of the bilateral knees.  Pertinent New Results:  Narrative & Impression  EXAMINATION:  XR PELVIS ROUTINE AP     CLINICAL HISTORY:  Pain, unspecified     TECHNIQUE:  AP view of the pelvis was performed.     COMPARISON:  None.     FINDINGS:  A fracture of either hip is not seen.  There are however osteoarthritic changes noted at both  hip joints with marked joint space narrowing and sclerosis.  The rest of the bones of the pelvis are intact.  The sacroiliac joints are sharp and symmetric.     Impression:     Osteoarthritic changes both hips.        Electronically signed by:Kiel Mendosa MD  Date:                                            05/06/2025  Time:                                           13:46        Exam Ended: 05/06/25 13:31 CDT Last Resulted: 05/06/25 13:46 CDT     XRAY Report / Interpretation:   AP and lateral views of the lumbar spine taken today in the office demonstrate scoliotic curvature with Hammond rods.  No acute osseous abnormalities.  Degenerative changes throughout the lumbar spine and visualized thoracic spine.    Assessment/Plan:      Patient with severe osteoarthritis in the bilateral hips she is miserable and has severely limited range of motion in the hips.  I have offered her total hip arthroplasties.  Left hip bothers her more than the right she would like to proceed with the left hip surgery 1st, then the right at a later time.  Risks and benefits were discussed with the patient in great detail and she wishes to proceed.     Patient was consented for surgery. Risks and benefits of the procedure were discussed in great detail to include the expected preoperative, intraoperative and postoperative courses. Complications may include but are not limited to bleeding, infection, damage to nerves, arteries, blood vessels, bones, tendons, ligaments, stiffness, instability, deep vein thrombus (DVT), pulmonary embolus (PE), altered sensation, continued pain, RSD, loss of motion or a need for additional surgery. As well as general anesthetic complications including seizure, stroke, heart attack and even death.    The mobility limitation cannot be sufficiently resolved by the use of a cane. Patient's functional mobility deficit can be sufficiently resolved with the use of a (Rolling Walker or Walker). Patient's mobility  "limitation significantly impairs their ability to participate in one of more activities of daily living. The use of a (RW or Walker) will significantly improve the patient's ability to participate in MRADLS and the patient will use it on regular basis in the home.    Smooth Garrison, Physician Assistant, served in the capacity as a "scribe" for this patient encounter.  A "face-to-face" encounter occurred with Dr. Constantin Murray on this date.  The treatment plan and medical decision-making is outlined above. Patient was seen and examined with a chaperone.       This note was created using Dragon voice recognition software that occasionally misinterpreted phrases or words.             [1]   Social History  Socioeconomic History    Marital status: Single   Tobacco Use    Smoking status: Every Day     Current packs/day: 0.25     Types: Cigarettes    Smokeless tobacco: Never   Substance and Sexual Activity    Alcohol use: Yes     Alcohol/week: 3.0 standard drinks of alcohol     Types: 3 Shots of liquor per week     Comment: socially    Drug use: Yes     Frequency: 14.0 times per week     Types: Marijuana     Comment: daily    Sexual activity: Yes     Partners: Male     Birth control/protection: Condom     Social Drivers of Health     Food Insecurity: Patient Declined (4/6/2024)    Received from [facility]    Hunger Vital Sign     Worried About Running Out of Food in the Last Year: Patient declined     Ran Out of Food in the Last Year: Patient declined   Transportation Needs: Patient Declined (4/6/2024)    Received from [facility]    PRAPARE - Transportation     Lack of Transportation (Medical): Patient declined     Lack of Transportation (Non-Medical): Patient declined     "

## 2025-05-06 NOTE — PROGRESS NOTES
[facility]    Subjective:     Chief Complaint:   Chief Complaint   Patient presents with    Right Hip - Pain     B/L Hip pain x 2-3 years. Denies any injury. Pain has gotten worse over the past few years. Was told she has bone spurs. Endorses crepitus. Pain located at lower back, lateral hips, into the groin. Unable to cross legs, open legs. Went to  for 15 sessions. No previous surgeries. Pain wakes from sleep. Pain is daily and constant. Pain with standing>sitting, painful to rise from sitting. Limited and painful ROM.    Left Hip - Pain       Past Medical History:   Diagnosis Date    Anxiety U    Asthma     Crohn's disease     Depression U    Dysphagia U    Menopause     Nausea & vomiting U    Peptic ulcer disease U    Scoliosis U       Past Surgical History:   Procedure Laterality Date    ABDOMINAL SURGERY      APPENDECTOMY      BACK SURGERY      COLECTOMY      COLECTOMY  2007    COLONOSCOPY  2014    COLONOSCOPY  10/30/2017    COLONOSCOPY N/A 10/21/2019    Procedure: COLONOSCOPY;  Surgeon: Felicita Miller MD;  Location: [facility];  Service: Endoscopy;  Laterality: N/A;    ESOPHAGEAL DILATION N/A 8/5/2019    Procedure: DILATION, ESOPHAGUS;  Surgeon: Felicita Miller MD;  Location: [facility];  Service: Endoscopy;  Laterality: N/A;    ESOPHAGOGASTRODUODENOSCOPY  05/12/2014    ESOPHAGOGASTRODUODENOSCOPY N/A 8/5/2019    Procedure: EGD (ESOPHAGOGASTRODUODENOSCOPY);  Surgeon: Felicita Miller MD;  Location: [facility];  Service: Endoscopy;  Laterality: N/A;    ESOPHAGOGASTRODUODENOSCOPY (EGD) WITH DILATION  10/19/2015    INSERTION OF VENOUS ACCESS PORT  2007    TUBAL LIGATION         Current Outpatient Medications   Medication Sig    adalimumab (HUMIRA) 10 mg/0.2 mL SyKt Inject 40 mg into the skin every 14 (fourteen) days.    clonazePAM (KLONOPIN) 0.5 MG tablet Take 0.5 mg by mouth once daily. At bedtme    lisinopriL (PRINIVIL,ZESTRIL) 20 MG tablet Take 20 mg by mouth.    multivitamin (THERAGRAN) per  tablet Take 1 tablet by mouth once daily.    promethazine (PHENERGAN) 25 MG tablet Take 25 mg by mouth every 4 (four) hours.    traMADol (ULTRAM) 50 mg tablet Take 50 mg by mouth every 6 (six) hours as needed for Pain.    vitamin D (VITAMIN D3) 1000 units Tab cholecalciferol (vitamin D3) 25 mcg (1,000 unit) tablet   Take 1 tablet every day by oral route in the morning for 30 days.    albuterol (ACCUNEB) 0.63 mg/3 mL Nebu Take 0.63 mg by nebulization every 6 (six) hours as needed (PRN shortness of breath). Rescue    albuterol (PROVENTIL) 2.5 mg /3 mL (0.083 %) nebulizer solution Take 2.5 mg by nebulization every 6 (six) hours as needed for Wheezing. Rescue    erythromycin (ROMYCIN) ophthalmic ointment Place a 1/2 inch ribbon of ointment into the lower eyelid.    HYDROcodone-acetaminophen (NORCO) 7.5-325 mg per tablet Take 1 tablet by mouth every 6 (six) hours as needed for Pain.    levoFLOXacin (LEVAQUIN) 500 MG tablet Take 500 mg by mouth.    methylPREDNISolone (MEDROL DOSEPACK) 4 mg tablet Use as directed    naproxen (NAPROSYN) 500 MG tablet Take 1 tablet (500 mg total) by mouth 2 (two) times daily with meals.     No current facility-administered medications for this visit.       Review of patient's allergies indicates:   Allergen Reactions    Morphine Hives    Azathioprine Rash    Codeine Rash       Family History   Problem Relation Name Age of Onset    Breast cancer Maternal Aunt      Breast cancer Maternal Grandmother         Social History[1]    History of present illness:  59-year-old female, presents to clinic today with complaints of chronic bilateral hip pain.  Decreased range motion.  Very painful and limited abduction, she states she can not spread her legs more than a few degrees.  She has an extreme difficulty getting out of her vehicle.  Due to her hip pain symptoms.      Review of Systems:    Constitution: Negative for chills, fever, and sweats.  Negative for unexplained weight loss.    HENT:   Negative for headaches and blurry vision.    Cardiovascular:Negative for chest pain or irregular heart beat. Negative for hypertension.    Respiratory:  Negative for cough and shortness of breath.    Gastrointestinal: Negative for abdominal pain, heartburn, melena, nausea, and vomitting.    Genitourinary:  Negative bladder incontinence and dysuria.    Musculoskeletal:  See HPI for details.     Neurological: Negative for numbness.    Psychiatric/Behavioral: Negative for depression.  The patient is not nervous/anxious.      Endocrine: Negative for polyuria    Hematologic/Lymphatic: Negative for bleeding problem.  Does not bruise/bleed easily.    Skin: Negative for poor would healing and rash    Objective:      Physical Examination:    Vital Signs:  There were no vitals filed for this visit.    Body mass index is 25.55 kg/m².    This a well-developed, well nourished patient in no acute distress.  They are alert and oriented and cooperative to examination.        Examination of the bilateral hips, patient with limited range of motion with abduction, very painful less than 30°.  Painful internal external rotation referred to the groin.  Straight leg raises are negative.  Normal range motion of the bilateral knees.  Pertinent New Results:  Narrative & Impression  EXAMINATION:  XR PELVIS ROUTINE AP     CLINICAL HISTORY:  Pain, unspecified     TECHNIQUE:  AP view of the pelvis was performed.     COMPARISON:  None.     FINDINGS:  A fracture of either hip is not seen.  There are however osteoarthritic changes noted at both hip joints with marked joint space narrowing and sclerosis.  The rest of the bones of the pelvis are intact.  The sacroiliac joints are sharp and symmetric.     Impression:     Osteoarthritic changes both hips.        Electronically signed by:Kiel Mendosa MD  Date:                                            05/06/2025  Time:                                           13:46        Exam Ended: 05/06/25  "13:31 CDT Last Resulted: 05/06/25 13:46 CDT     XRAY Report / Interpretation:   AP and lateral views of the lumbar spine taken today in the office demonstrate scoliotic curvature with Hammond rods.  No acute osseous abnormalities.  Degenerative changes throughout the lumbar spine and visualized thoracic spine.    Assessment/Plan:      Patient with severe osteoarthritis in the bilateral hips she is miserable and has severely limited range of motion in the hips.  I have offered her total hip arthroplasties.  Right hip bothers her more than the left she would like to proceed with the right hip surgery 1st.  Risks and benefits were discussed with the patient in great detail and she wishes to proceed.    Patient was consented for surgery. Risks and benefits of the procedure were discussed in great detail to include the expected preoperative, intraoperative and postoperative courses. Complications may include but are not limited to bleeding, infection, damage to nerves, arteries, blood vessels, bones, tendons, ligaments, stiffness, instability, deep vein thrombus (DVT), pulmonary embolus (PE), altered sensation, continued pain, RSD, loss of motion or a need for additional surgery. As well as general anesthetic complications including seizure, stroke, heart attack and even death.    The mobility limitation cannot be sufficiently resolved by the use of a cane. Patient's functional mobility deficit can be sufficiently resolved with the use of a (Rolling Walker or Walker). Patient's mobility limitation significantly impairs their ability to participate in one of more activities of daily living. The use of a (RW or Walker) will significantly improve the patient's ability to participate in MRADLS and the patient will use it on regular basis in the home.    Smooth Garrison, Physician Assistant, served in the capacity as a "scribe" for this patient encounter.  A "face-to-face" encounter occurred with Dr. Constantin Murray on this " date.  The treatment plan and medical decision-making is outlined above. Patient was seen and examined with a chaperone.       This note was created using Dragon voice recognition software that occasionally misinterpreted phrases or words.             [1]   Social History  Socioeconomic History    Marital status: Single   Tobacco Use    Smoking status: Every Day     Current packs/day: 0.25     Types: Cigarettes    Smokeless tobacco: Never   Substance and Sexual Activity    Alcohol use: Yes     Alcohol/week: 3.0 standard drinks of alcohol     Types: 3 Shots of liquor per week     Comment: socially    Drug use: Yes     Frequency: 14.0 times per week     Types: Marijuana     Comment: daily    Sexual activity: Yes     Partners: Male     Birth control/protection: Condom     Social Drivers of Health     Food Insecurity: Patient Declined (4/6/2024)    Received from [facility]    Hunger Vital Sign     Worried About Running Out of Food in the Last Year: Patient declined     Ran Out of Food in the Last Year: Patient declined   Transportation Needs: Patient Declined (4/6/2024)    Received from [facility]    PRAPARE - Transportation     Lack of Transportation (Medical): Patient declined     Lack of Transportation (Non-Medical): Patient declined

## 2025-05-13 ENCOUNTER — TELEPHONE (OUTPATIENT)
Dept: ORTHOPEDICS | Facility: CLINIC | Age: 60
End: 2025-05-13
Payer: MEDICAID

## 2025-05-13 NOTE — TELEPHONE ENCOUNTER
----- Message from Gus Covington sent at 5/12/2025  3:41 PM CDT -----  Please call patient about changing laterality of YU scheduled 05/28/25

## 2025-05-13 NOTE — TELEPHONE ENCOUNTER
Spoke with the patient. Will change laterality from right to left.     Note from office visit 5/6/25:    Patient with severe osteoarthritis in the bilateral hips she is miserable and has severely limited range of motion in the hips. I have offered her total hip arthroplasties. Right hip bothers her more than the left she would like to proceed with the right hip surgery 1st.

## 2025-05-14 ENCOUNTER — HOSPITAL ENCOUNTER (OUTPATIENT)
Dept: PREADMISSION TESTING | Facility: HOSPITAL | Age: 60
Discharge: HOME | End: 2025-05-14
Attending: ORTHOPAEDIC SURGERY
Payer: MEDICAID

## 2025-05-14 DIAGNOSIS — M16.12 PRIMARY OSTEOARTHRITIS OF LEFT HIP: ICD-10-CM

## 2025-05-14 DIAGNOSIS — M16.11 PRIMARY OSTEOARTHRITIS OF RIGHT HIP: ICD-10-CM

## 2025-05-14 NOTE — PRE ADMISSION SCREENING
"               C[facility] Risk Assessment Scale    Patient Name: Spencer Levi  YOB: 1965  MRN: [id number]            RIsk Factor Measure Recommendation Patient Data Scale/Score   BMI >40 Reconsider surgery, weight loss   Estimated body mass index is 25.55 kg/m² as calculated from the following:    Height as of 5/6/25: 5' 9" (1.753 m).    Weight as of 5/6/25: 78.5 kg (173 lb).   [] 0 = 1 - 24.9  [x] 1 = 25-29.9  [] 2 = 30-34.9  [] 3 = 35-39.9  [] 4 = 40-44.9  [] 5 = 45-99.9   Hemoglobin AIC (if applicable) >9 Delay surgery until DM under control  Refer for:  Nutrition Therapy  Exercise   Medication    Lab Results   Component Value Date    HGBA1C 4.9 04/07/2024       Lab Results   Component Value Date    GLU 96 07/02/2024      [x] 0 = 4.0-5.6  [] 1 = 5.7-6.4  [] 2 = 6.5-6.9  [] 3 = 7.0-7.9  [] 4 = 8.0-8.9  [] 5 = 9.0-12   Hemoglobin (Anemia) <9 Delay surgery   Correct anemia Lab Results   Component Value Date    HGB 12.4 07/02/2024    [] 20 - <9.0                    Albumin <3 Delay surgery &Workup Lab Results   Component Value Date    ALBUMIN 3.9 02/22/2025    [] 20 - <3.0   Smoking Cessation >4 Weeks Delay Surgery  Refer to OP Cessation Class    Current Everyday Smoker [x] 20 - current smoker                                _0.25 PPD                    Hx of MI, PE, Arrhythmia, CVA, DVT <30 Days Delay Surgery    N/A [] 20      Infection Variable Delay surgery and re-evaluate   N/A [] 20 - recent/current infection     Depression (PHQ) >10 out of 27 Delay Surgery and re-evaluate  Medication  Counseling              [x] 0     []1     []2     []3      []4      [] 5                    (1-4)      (5-9)  (10-14)  (15-19)   (20-27)     Memory Impairment & Memory loss (Mini-Cog Screening Tool) Advanced dementia and/or Parkinson's Reconsider surgery     [x] 0     []1     []2     []3     []4     [] 5     Physical Conditioning (Modified AM-PAC Per Physical Therapy at [facility]) Unable to ambulate on day of surgery Delay " surgery and re-evaluate  Pre-Rehabilitation   (PT evaluation)       []  0   [x]4       []8     []12        []16     []20       (<20%)   (<40%)   (<60%)   (<80% )    (>80%)     Home Environment/Caregiver support  (Per /Navigator Interview)    Availability of basic services and/or approprate assistance during post-operative period Delay surgery and re-evaluate  Safe home environment  Health   1 week post-surgery  Transportation  availability  Ability to obtain DME/Medications post-op    [x] 0     []1     []2     []3     []4     [] 5  [x] 0     []1     []2     []3     []4     [] 5  [x] 0     []1     []2     []3     []4     [] 5  [x] 0     []1     []2     []3     []4     [] 5         MD Contact: Dr. Murray Comments:  Total Score:  25

## 2025-05-14 NOTE — PRE ADMISSION SCREENING
Patient Name: Spencer Levi  YOB: 1965   MRN: [id number]     [facility]   Basic Mobility Inpatient Short Form 6 Clicks         How much difficulty does the patient currently have  Unable  A Lot  A Little  None      1. Turning over in bed (including adjusting bedclothes, sheets and blankets)?     1 []    2 []    3 []    4 [x]        2. Sitting down on and standing up from a chair with arms (e.g., wheelchair, bedside commode, etc.)     1 []  2 []  3 [x]     4 []      3. Moving from lying on back to sitting on the side of the bed?     1 []  2 []  3 [x]    4 []    How much help from another person does the patient currently need  Total  A Lot  A Little  None      4. Moving to and from a bed to a chair (including a wheelchair)?    1 []  2 []  3 []    4 [x]      5. Need to walk in hospital room?    1 []  2 []  3 []    4 [x]      6. Climbing 3-5 steps with a railing?    1 []  2 []  3 []    4 [x]       Raw Score:     22             CMS 0-100% Score:   20.91         %   Standardized Score:    53.28           CMS Modifier:     CJ                                     [facility]   Basic Mobility Inpatient Short Form 6 Clicks Score Conversion Table*         *Use this form to convert -PAC Basic Mobility Inpatient Raw Scores.   [facility] Inpatient Basic Mobility Short Form Scoring Example   1. Add the number values associated with the response to each item. For example, items totals yield a Raw Score of 21.   2. Match the raw score to the t-Scale scores (t-Scale score = 50.25, SE = 4.69).   3. Find the associated CMS % (CMS % = 28.97%).   4. Locate the correct CMS Functional Modifier Code, or G Code (G code = CJ)     NOTE: Each -PAC Short Form has a separate conversion table. Make sure that you use the correct conversion table.       Instruction Manual - page 45 contains conversion table

## 2025-05-14 NOTE — PRE ADMISSION SCREENING
JOINT CAMP ASSESSMENT    Name Spencer Levi   MRN [id number]    Age/Sex 59 y.o. female    Surgeon Dr. Constantin Murray   Joint Camp Date 5/14/2025   Surgery Date 5/28/2025   Procedure Right Hip Arthroplasty   Insurance Payor: MEDICAID / [facility] / Product Type: Managed Medicaid /    Care Team Patient Care Team:  Miles Mabry MD as PCP - General (Internal Medicine)  Felicita Miller MD as Consulting Physician (Gastroenterology)    Pharmacy   [facility] [address] [facility] [address]  [address] [address] [address]  Phone: [phone] Fax: [phone]     AM-PAC Score   22   Risk Assessment Score 25     Past Medical History:   Diagnosis Date    Anxiety U    Asthma     Crohn's disease     Depression U    Dysphagia U    Menopause     Nausea & vomiting U    Peptic ulcer disease U    Scoliosis U       Past Surgical History:   Procedure Laterality Date    ABDOMINAL SURGERY      APPENDECTOMY      BACK SURGERY      COLECTOMY      COLECTOMY  2007    COLONOSCOPY  2014    COLONOSCOPY  10/30/2017    COLONOSCOPY N/A 10/21/2019    Procedure: COLONOSCOPY;  Surgeon: Felicita Miller MD;  Location: [facility];  Service: Endoscopy;  Laterality: N/A;    ESOPHAGEAL DILATION N/A 8/5/2019    Procedure: DILATION, ESOPHAGUS;  Surgeon: Felicita Miller MD;  Location: [facility];  Service: Endoscopy;  Laterality: N/A;    ESOPHAGOGASTRODUODENOSCOPY  05/12/2014    ESOPHAGOGASTRODUODENOSCOPY N/A 8/5/2019    Procedure: EGD (ESOPHAGOGASTRODUODENOSCOPY);  Surgeon: Felicita Miller MD;  Location: [facility];  Service: Endoscopy;  Laterality: N/A;    ESOPHAGOGASTRODUODENOSCOPY (EGD) WITH DILATION  10/19/2015    INSERTION OF VENOUS ACCESS PORT  2007    TUBAL LIGATION           Home Enviroment     Living Arrangement: Lives with family  Home Environment: 1-story house/ trailer, number of outside stairs: 5 with a railing, tub-shower  Home Safety Concerns: Pets in the home: dogs (2).    DISCHARGE  CAREGIVER/SUPPORT SYSTEM     Identified post-op caregiver: Patient has children / family / friends to help, son.  Patient's caregiver(s) will be able to provide physical assistance. Patient will have someone to assist overnight.      Caregiver present at pre-op interview:  No      PRE-OPERATIVE FUNCTIONAL STATUS     Employment: Disabled    Pre-op Functional Status: Patient is independent with mobility/ambulation, transfers, ADL's, IADL's.    Use of assistive device for ambulation: none  ADL: self care  ADL Limitations: difficulty with walking  Medical Restrictions: Unstable ambulation and Decreased range of motions in extremities    POTENTIAL BARRIERS TO DISCHARGE/POTENTIAL POST-OP COMPLICATIONS     Patient is a current everyday smoker, which could delay wound healing. Patient with hx of asthma, nausea & vomiting. POSSIBLE SAME DAY DISCHARGE.    DISCHARGE PLAN     Expected LOS of 1 days or less for joint replacement discussed with patient. We also discussed a discharge path of HH for approximately two weeks with a transition to outpatient PT on the third week given no post-op complications.      Patient in agreement with discharge plan: Yes    Discharge to: Discharge home with home health (PT/OT) x2 weeks with transition to outpatient PT     HH:  [facility] ([place], [place]). Patient disclosure form completed and sent to case management for upload to the medical record.      OP PT: [facility] ([facility])     Home DME: none    Needed DME at D/C: rolling walker, bedside commode, tub transfer bench, and assistive device kit. Patient to purchase BSC & Tub Transfer Bench from retail prior to surgery.     Rx: Per Dr. Murray at discharge     Meds to Beds: Yes  Patient expected to discharge on Aspirin 81mg by mouth twice daily for DVT prophylaxis.

## 2025-05-21 ENCOUNTER — HOSPITAL ENCOUNTER (OUTPATIENT)
Dept: PREADMISSION TESTING | Facility: HOSPITAL | Age: 60
Discharge: HOME | End: 2025-05-21
Attending: ORTHOPAEDIC SURGERY
Payer: MEDICAID

## 2025-05-21 VITALS
OXYGEN SATURATION: 99 % | HEART RATE: 66 BPM | RESPIRATION RATE: 20 BRPM | DIASTOLIC BLOOD PRESSURE: 83 MMHG | WEIGHT: 173 LBS | SYSTOLIC BLOOD PRESSURE: 156 MMHG | TEMPERATURE: 98 F | HEIGHT: 69 IN | BODY MASS INDEX: 25.62 KG/M2

## 2025-05-21 DIAGNOSIS — Z01.818 PRE-OP TESTING: ICD-10-CM

## 2025-05-21 DIAGNOSIS — M16.11 PRIMARY OSTEOARTHRITIS OF RIGHT HIP: ICD-10-CM

## 2025-05-21 LAB
ABORH RETYPE: NORMAL
ABSOLUTE EOSINOPHIL (SMH): 0.11 K/UL
ABSOLUTE MONOCYTE (SMH): 0.34 K/UL (ref 0.3–1)
ABSOLUTE NEUTROPHIL COUNT (SMH): 1.5 K/UL (ref 1.8–7.7)
ANION GAP (SMH): 6 MMOL/L (ref 8–16)
BASOPHILS # BLD AUTO: 0.02 K/UL
BASOPHILS NFR BLD AUTO: 0.5 %
BUN SERPL-MCNC: 15 MG/DL (ref 8–23)
CALCIUM SERPL-MCNC: 9.8 MG/DL (ref 8.7–10.5)
CHLORIDE SERPL-SCNC: 102 MMOL/L (ref 95–110)
CO2 SERPL-SCNC: 32 MMOL/L (ref 23–29)
CREAT SERPL-MCNC: 0.9 MG/DL (ref 0.5–1.4)
ERYTHROCYTE [DISTWIDTH] IN BLOOD BY AUTOMATED COUNT: 13 % (ref 11.5–14.5)
GFR SERPLBLD CREATININE-BSD FMLA CKD-EPI: >60 ML/MIN/1.73/M2
GLUCOSE SERPL-MCNC: 81 MG/DL (ref 70–110)
HCT VFR BLD AUTO: 42.7 % (ref 37–48.5)
HGB BLD-MCNC: 14.3 GM/DL (ref 12–16)
IMM GRANULOCYTES # BLD AUTO: 0.01 K/UL (ref 0–0.04)
IMM GRANULOCYTES NFR BLD AUTO: 0.2 % (ref 0–0.5)
INDIRECT COOMBS: NORMAL
LYMPHOCYTES # BLD AUTO: 2.47 K/UL (ref 1–4.8)
MCH RBC QN AUTO: 33.1 PG (ref 27–31)
MCHC RBC AUTO-ENTMCNC: 33.5 G/DL (ref 32–36)
MCV RBC AUTO: 99 FL (ref 82–98)
NUCLEATED RBC (/100WBC) (SMH): 0 /100 WBC
PLATELET # BLD AUTO: 257 K/UL (ref 150–450)
PMV BLD AUTO: 10.5 FL (ref 9.2–12.9)
POTASSIUM SERPL-SCNC: 3.7 MMOL/L (ref 3.5–5.1)
RBC # BLD AUTO: 4.32 M/UL (ref 4–5.4)
RELATIVE EOSINOPHIL (SMH): 2.5 % (ref 0–8)
RELATIVE LYMPHOCYTE (SMH): 55.8 % (ref 18–48)
RELATIVE MONOCYTE (SMH): 7.7 % (ref 4–15)
RELATIVE NEUTROPHIL (SMH): 33.3 % (ref 38–73)
RH BLD: NORMAL
SODIUM SERPL-SCNC: 140 MMOL/L (ref 136–145)
SPECIMEN OUTDATE: NORMAL
WBC # BLD AUTO: 4.43 K/UL (ref 3.9–12.7)

## 2025-05-21 PROCEDURE — 85025 COMPLETE CBC W/AUTO DIFF WBC: CPT | Performed by: ORTHOPAEDIC SURGERY

## 2025-05-21 PROCEDURE — 82310 ASSAY OF CALCIUM: CPT | Performed by: ORTHOPAEDIC SURGERY

## 2025-05-21 PROCEDURE — 86901 BLOOD TYPING SEROLOGIC RH(D): CPT | Performed by: ORTHOPAEDIC SURGERY

## 2025-05-21 PROCEDURE — 93010 ELECTROCARDIOGRAM REPORT: CPT | Mod: ,,, | Performed by: INTERNAL MEDICINE

## 2025-05-21 PROCEDURE — 93005 ELECTROCARDIOGRAM TRACING: CPT | Performed by: INTERNAL MEDICINE

## 2025-05-21 PROCEDURE — 36415 COLL VENOUS BLD VENIPUNCTURE: CPT | Performed by: ORTHOPAEDIC SURGERY

## 2025-05-21 RX ORDER — ASPIRIN 81 MG/1
81 TABLET ORAL DAILY
COMMUNITY
End: 2025-05-23

## 2025-05-21 NOTE — PRE ADMISSION SCREENING
Preadmit assessment complete. Review of patient health history and home medications. Questions answered. Patient voiced understanding. Chlorhexidine scrub given to patient for preop shower Preop education per AVS    [facility]

## 2025-05-21 NOTE — DISCHARGE INSTRUCTIONS
To confirm, Your doctor has instructed you that surgery is scheduled for: Wednesday 5/28/25    Pre-Op will call the afternoon prior to surgery between 4:00 and 6:00 PM with the final arrival time.      [facility]    Do not eat anything after midnight the night before your surgery - You may have clear liquids up to 2 hours before coming in for surgery. This includes water, Gatorade, clear juice,     YOU MAY BRUSH YOUR TEETH     TAKE APPROVED  MEDICATIONS WITH A SMALL SIP OF WATER THE MORNING OF YOUR PROCEDURE: See Medication list    PLEASE NOTE:  The surgery schedule has many variables which may affect the time of your surgery case.  Family members should be available if your surgery time changes.  Plan to be here the day of your procedure between 4-6 hours.    DO NOT TAKE THESE MEDICATIONS 5-7 DAYS PRIOR to your procedure or per your surgeon's request: ASPIRIN, ALEVE, ADVIL, IBUPROFEN,  RIGOBERTO SELTZER, BC , FISH OIL , VITAMIN E, HERBALS  (May take Tylenol)    ONLY if you are prescribed any types of blood thinners such as:  Aspirin, Coumadin, Plavix, Pradaxa, Xarelto, Aggrenox, Effient, Eliquis, Savasya, Brilinta, or any other, ask your surgeon whether you should stop taking them and how long before surgery you should stop.  You may also need to verify with the prescribing physician if it is ok to stop your medication.                                                     IMPORTANT INSTRUCTIONS    Do not smoke, vape or drink alcoholic beverages 24 hours prior to your procedure.  Shower the night before AND the morning of your procedure with a Chlorhexidine wash such as Hibiclens or Dial antibacterial soap from the neck down.    Do not get it on your face or in your eyes.  You may use your own shampoo and face wash. This helps your skin to be as bacteria free as possible.   Do not apply any deodorant, lotion or powder after you shower.   DO NOT remove hair from the surgery site.  Do  not shave the incision site unless you are given specific instructions to do so.    Sleep in a bed with clean sheets.  Do not sleep with a pet in the bed.   If you wear contact lenses, dentures, hearing aids or glasses, bring a container to put them in during surgery and give to a family member for safe keeping.    Please leave all jewelry, piercing's and valuables at home.   Wear comfortable clothing that is easy to remove and place back on after surgery.     Make arrangements in advance for transportation home by a responsible adult.    You must make arrangements for transportation, TAXI'S, UBER'S OR LYFTS ARE NOT ALLOWED.      If you have any questions about these instructions, call Pre-Op Admit  Nursing at [phone] ,  or call  Pre-Op Day Surgery Unit at [phone]

## 2025-05-23 DIAGNOSIS — M16.12 PRIMARY OSTEOARTHRITIS OF LEFT HIP: Primary | ICD-10-CM

## 2025-05-23 LAB
OHS QRS DURATION: 142 MS
OHS QTC CALCULATION: 476 MS

## 2025-05-23 RX ORDER — ASPIRIN 81 MG/1
81 TABLET ORAL EVERY 12 HOURS
Qty: 60 TABLET | Refills: 0 | Status: SHIPPED | OUTPATIENT
Start: 2025-05-23 | End: 2025-06-22

## 2025-05-23 RX ORDER — GABAPENTIN 300 MG/1
300 CAPSULE ORAL 2 TIMES DAILY
Qty: 60 CAPSULE | Refills: 0 | Status: SHIPPED | OUTPATIENT
Start: 2025-05-23 | End: 2025-06-22

## 2025-05-23 RX ORDER — OXYCODONE AND ACETAMINOPHEN 7.5; 325 MG/1; MG/1
1 TABLET ORAL EVERY 6 HOURS PRN
Qty: 28 TABLET | Refills: 0 | Status: SHIPPED | OUTPATIENT
Start: 2025-05-23

## 2025-05-23 RX ORDER — DOCUSATE SODIUM 100 MG/1
100 CAPSULE, LIQUID FILLED ORAL 2 TIMES DAILY
Qty: 60 CAPSULE | Refills: 0 | Status: SHIPPED | OUTPATIENT
Start: 2025-05-23 | End: 2025-06-22

## 2025-05-23 RX ORDER — CELECOXIB 200 MG/1
200 CAPSULE ORAL 2 TIMES DAILY
Qty: 60 CAPSULE | Refills: 0 | Status: SHIPPED | OUTPATIENT
Start: 2025-05-23 | End: 2025-06-22

## 2025-05-23 RX ORDER — ONDANSETRON 4 MG/1
4 TABLET, FILM COATED ORAL EVERY 8 HOURS PRN
Qty: 30 TABLET | Refills: 0 | Status: SHIPPED | OUTPATIENT
Start: 2025-05-23

## 2025-05-28 ENCOUNTER — HOSPITAL ENCOUNTER (OUTPATIENT)
Facility: HOSPITAL | Age: 60
Discharge: HOME | End: 2025-05-28
Attending: ORTHOPAEDIC SURGERY | Admitting: ORTHOPAEDIC SURGERY
Payer: MEDICAID

## 2025-05-28 ENCOUNTER — ANESTHESIA (OUTPATIENT)
Dept: SURGERY | Facility: HOSPITAL | Age: 60
End: 2025-05-28
Payer: MEDICAID

## 2025-05-28 ENCOUNTER — ANESTHESIA EVENT (OUTPATIENT)
Dept: SURGERY | Facility: HOSPITAL | Age: 60
End: 2025-05-28
Payer: MEDICAID

## 2025-05-28 VITALS
HEART RATE: 71 BPM | OXYGEN SATURATION: 98 % | SYSTOLIC BLOOD PRESSURE: 134 MMHG | TEMPERATURE: 98 F | DIASTOLIC BLOOD PRESSURE: 68 MMHG | RESPIRATION RATE: 18 BRPM

## 2025-05-28 DIAGNOSIS — M16.11 PRIMARY OSTEOARTHRITIS OF RIGHT HIP: Primary | ICD-10-CM

## 2025-05-28 DIAGNOSIS — Z01.818 PRE-OP TESTING: ICD-10-CM

## 2025-05-28 PROCEDURE — 97116 GAIT TRAINING THERAPY: CPT

## 2025-05-28 PROCEDURE — 25000003 PHARM REV CODE 250: Performed by: ORTHOPAEDIC SURGERY

## 2025-05-28 PROCEDURE — 27130 TOTAL HIP ARTHROPLASTY: CPT | Mod: LT,,, | Performed by: ORTHOPAEDIC SURGERY

## 2025-05-28 PROCEDURE — 36000711: Performed by: ORTHOPAEDIC SURGERY

## 2025-05-28 PROCEDURE — C1776 JOINT DEVICE (IMPLANTABLE): HCPCS | Performed by: ORTHOPAEDIC SURGERY

## 2025-05-28 PROCEDURE — 36000710: Performed by: ORTHOPAEDIC SURGERY

## 2025-05-28 PROCEDURE — 37000009 HC ANESTHESIA EA ADD 15 MINS: Performed by: ORTHOPAEDIC SURGERY

## 2025-05-28 PROCEDURE — 71000016 HC POSTOP RECOV ADDL HR: Performed by: ORTHOPAEDIC SURGERY

## 2025-05-28 PROCEDURE — 97165 OT EVAL LOW COMPLEX 30 MIN: CPT

## 2025-05-28 PROCEDURE — 25000003 PHARM REV CODE 250: Performed by: ANESTHESIOLOGY

## 2025-05-28 PROCEDURE — 63600175 PHARM REV CODE 636 W HCPCS: Performed by: NURSE ANESTHETIST, CERTIFIED REGISTERED

## 2025-05-28 PROCEDURE — 27201423 OPTIME MED/SURG SUP & DEVICES STERILE SUPPLY: Performed by: ORTHOPAEDIC SURGERY

## 2025-05-28 PROCEDURE — 71000039 HC RECOVERY, EACH ADD'L HOUR: Performed by: ORTHOPAEDIC SURGERY

## 2025-05-28 PROCEDURE — 63600175 PHARM REV CODE 636 W HCPCS: Performed by: ORTHOPAEDIC SURGERY

## 2025-05-28 PROCEDURE — 71000033 HC RECOVERY, INTIAL HOUR: Performed by: ORTHOPAEDIC SURGERY

## 2025-05-28 PROCEDURE — 25000003 PHARM REV CODE 250: Performed by: NURSE ANESTHETIST, CERTIFIED REGISTERED

## 2025-05-28 PROCEDURE — 37000008 HC ANESTHESIA 1ST 15 MINUTES: Performed by: ORTHOPAEDIC SURGERY

## 2025-05-28 PROCEDURE — 63600175 PHARM REV CODE 636 W HCPCS: Mod: JZ | Performed by: ANESTHESIOLOGY

## 2025-05-28 PROCEDURE — 97535 SELF CARE MNGMENT TRAINING: CPT

## 2025-05-28 PROCEDURE — 71000015 HC POSTOP RECOV 1ST HR: Performed by: ORTHOPAEDIC SURGERY

## 2025-05-28 PROCEDURE — 97161 PT EVAL LOW COMPLEX 20 MIN: CPT

## 2025-05-28 DEVICE — PINNACLE HIP SOLUTIONS ALTRX POLYETHYLENE ACETABULAR LINER +4 10 DEGREE 36MM ID 54MM OD
Type: IMPLANTABLE DEVICE | Site: HIP | Status: FUNCTIONAL
Brand: PINNACLE ALTRX

## 2025-05-28 DEVICE — PINNACLE GRIPTION ACETABULAR SHELL SECTOR 54MM OD
Type: IMPLANTABLE DEVICE | Site: HIP | Status: FUNCTIONAL
Brand: PINNACLE GRIPTION

## 2025-05-28 DEVICE — SUMMIT FEMORAL STEM 12/14 TAPER TAPER ED W/POROCOAT SIZE 7 STD 155MM
Type: IMPLANTABLE DEVICE | Site: HIP | Status: FUNCTIONAL
Brand: SUMMIT POROCOAT

## 2025-05-28 RX ORDER — DEXMEDETOMIDINE HYDROCHLORIDE 100 UG/ML
INJECTION, SOLUTION INTRAVENOUS
Status: DISCONTINUED | OUTPATIENT
Start: 2025-05-28 | End: 2025-05-28

## 2025-05-28 RX ORDER — ZOLPIDEM TARTRATE 5 MG/1
5 TABLET ORAL NIGHTLY PRN
Status: DISCONTINUED | OUTPATIENT
Start: 2025-05-28 | End: 2025-05-28 | Stop reason: HOSPADM

## 2025-05-28 RX ORDER — HYDROMORPHONE HYDROCHLORIDE 1 MG/ML
0.2 INJECTION, SOLUTION INTRAMUSCULAR; INTRAVENOUS; SUBCUTANEOUS EVERY 5 MIN PRN
Status: DISCONTINUED | OUTPATIENT
Start: 2025-05-28 | End: 2025-05-28 | Stop reason: HOSPADM

## 2025-05-28 RX ORDER — HYDROCODONE BITARTRATE AND ACETAMINOPHEN 5; 325 MG/1; MG/1
1 TABLET ORAL EVERY 4 HOURS PRN
Status: DISCONTINUED | OUTPATIENT
Start: 2025-05-28 | End: 2025-05-28 | Stop reason: HOSPADM

## 2025-05-28 RX ORDER — OXYCODONE HYDROCHLORIDE 5 MG/1
5 TABLET ORAL
Status: DISCONTINUED | OUTPATIENT
Start: 2025-05-28 | End: 2025-05-28 | Stop reason: HOSPADM

## 2025-05-28 RX ORDER — DIPHENHYDRAMINE HYDROCHLORIDE 50 MG/ML
12.5 INJECTION, SOLUTION INTRAMUSCULAR; INTRAVENOUS
Status: DISCONTINUED | OUTPATIENT
Start: 2025-05-28 | End: 2025-05-28 | Stop reason: HOSPADM

## 2025-05-28 RX ORDER — MUPIROCIN 20 MG/G
1 OINTMENT TOPICAL 2 TIMES DAILY
Status: DISCONTINUED | OUTPATIENT
Start: 2025-05-28 | End: 2025-05-28 | Stop reason: HOSPADM

## 2025-05-28 RX ORDER — LIDOCAINE HYDROCHLORIDE 10 MG/ML
INJECTION, SOLUTION EPIDURAL; INFILTRATION; INTRACAUDAL; PERINEURAL
Status: DISCONTINUED | OUTPATIENT
Start: 2025-05-28 | End: 2025-05-28

## 2025-05-28 RX ORDER — ROCURONIUM BROMIDE 10 MG/ML
INJECTION, SOLUTION INTRAVENOUS
Status: DISCONTINUED | OUTPATIENT
Start: 2025-05-28 | End: 2025-05-28

## 2025-05-28 RX ORDER — MIDAZOLAM HYDROCHLORIDE 1 MG/ML
INJECTION INTRAMUSCULAR; INTRAVENOUS
Status: DISCONTINUED | OUTPATIENT
Start: 2025-05-28 | End: 2025-05-28

## 2025-05-28 RX ORDER — ACETAMINOPHEN 10 MG/ML
INJECTION, SOLUTION INTRAVENOUS
Status: DISCONTINUED | OUTPATIENT
Start: 2025-05-28 | End: 2025-05-28

## 2025-05-28 RX ORDER — CEFAZOLIN 2 G/1
2 INJECTION, POWDER, FOR SOLUTION INTRAMUSCULAR; INTRAVENOUS
Status: COMPLETED | OUTPATIENT
Start: 2025-05-28 | End: 2025-05-28

## 2025-05-28 RX ORDER — KETAMINE HCL IN 0.9 % NACL 50 MG/5 ML
SYRINGE (ML) INTRAVENOUS
Status: DISCONTINUED | OUTPATIENT
Start: 2025-05-28 | End: 2025-05-28

## 2025-05-28 RX ORDER — BISACODYL 10 MG/1
10 SUPPOSITORY RECTAL DAILY
Status: DISCONTINUED | OUTPATIENT
Start: 2025-05-28 | End: 2025-05-28 | Stop reason: HOSPADM

## 2025-05-28 RX ORDER — LORAZEPAM 2 MG/ML
0.5 INJECTION INTRAMUSCULAR EVERY 5 MIN PRN
Status: DISCONTINUED | OUTPATIENT
Start: 2025-05-28 | End: 2025-05-28 | Stop reason: HOSPADM

## 2025-05-28 RX ORDER — SODIUM CHLORIDE, SODIUM LACTATE, POTASSIUM CHLORIDE, CALCIUM CHLORIDE 600; 310; 30; 20 MG/100ML; MG/100ML; MG/100ML; MG/100ML
INJECTION, SOLUTION INTRAVENOUS CONTINUOUS PRN
Status: DISCONTINUED | OUTPATIENT
Start: 2025-05-28 | End: 2025-05-28

## 2025-05-28 RX ORDER — ONDANSETRON HYDROCHLORIDE 2 MG/ML
INJECTION, SOLUTION INTRAVENOUS
Status: DISCONTINUED | OUTPATIENT
Start: 2025-05-28 | End: 2025-05-28

## 2025-05-28 RX ORDER — FENTANYL CITRATE 50 UG/ML
INJECTION, SOLUTION INTRAMUSCULAR; INTRAVENOUS
Status: DISCONTINUED | OUTPATIENT
Start: 2025-05-28 | End: 2025-05-28

## 2025-05-28 RX ORDER — PROPOFOL 10 MG/ML
VIAL (ML) INTRAVENOUS
Status: DISCONTINUED | OUTPATIENT
Start: 2025-05-28 | End: 2025-05-28

## 2025-05-28 RX ORDER — TRANEXAMIC ACID 10 MG/ML
1000 INJECTION, SOLUTION INTRAVENOUS
Status: COMPLETED | OUTPATIENT
Start: 2025-05-28 | End: 2025-05-28

## 2025-05-28 RX ORDER — CEFAZOLIN SODIUM 1 G/3ML
1 INJECTION, POWDER, FOR SOLUTION INTRAMUSCULAR; INTRAVENOUS
Status: DISCONTINUED | OUTPATIENT
Start: 2025-05-28 | End: 2025-05-28 | Stop reason: HOSPADM

## 2025-05-28 RX ORDER — TRANEXAMIC ACID 10 MG/ML
1000 INJECTION, SOLUTION INTRAVENOUS ONCE
Status: COMPLETED | OUTPATIENT
Start: 2025-05-28 | End: 2025-05-28

## 2025-05-28 RX ORDER — LABETALOL HYDROCHLORIDE 5 MG/ML
INJECTION, SOLUTION INTRAVENOUS
Status: DISCONTINUED | OUTPATIENT
Start: 2025-05-28 | End: 2025-05-28

## 2025-05-28 RX ORDER — SUCCINYLCHOLINE CHLORIDE 20 MG/ML
INJECTION INTRAMUSCULAR; INTRAVENOUS
Status: DISCONTINUED | OUTPATIENT
Start: 2025-05-28 | End: 2025-05-28

## 2025-05-28 RX ORDER — DEXAMETHASONE SODIUM PHOSPHATE 4 MG/ML
INJECTION, SOLUTION INTRA-ARTICULAR; INTRALESIONAL; INTRAMUSCULAR; INTRAVENOUS; SOFT TISSUE
Status: DISCONTINUED | OUTPATIENT
Start: 2025-05-28 | End: 2025-05-28

## 2025-05-28 RX ORDER — GLUCAGON 1 MG
1 KIT INJECTION
Status: DISCONTINUED | OUTPATIENT
Start: 2025-05-28 | End: 2025-05-28 | Stop reason: HOSPADM

## 2025-05-28 RX ORDER — FAMOTIDINE 10 MG/ML
INJECTION, SOLUTION INTRAVENOUS
Status: DISCONTINUED | OUTPATIENT
Start: 2025-05-28 | End: 2025-05-28

## 2025-05-28 RX ORDER — SODIUM CHLORIDE 0.9 % (FLUSH) 0.9 %
5 SYRINGE (ML) INJECTION
Status: DISCONTINUED | OUTPATIENT
Start: 2025-05-28 | End: 2025-05-28 | Stop reason: HOSPADM

## 2025-05-28 RX ORDER — TRANEXAMIC ACID 1 G/10ML
INJECTION, SOLUTION INTRAVENOUS
Status: DISCONTINUED | OUTPATIENT
Start: 2025-05-28 | End: 2025-05-28

## 2025-05-28 RX ORDER — FAMOTIDINE 20 MG/1
20 TABLET, FILM COATED ORAL 2 TIMES DAILY
Status: CANCELLED | OUTPATIENT
Start: 2025-05-28

## 2025-05-28 RX ORDER — ONDANSETRON HYDROCHLORIDE 2 MG/ML
4 INJECTION, SOLUTION INTRAVENOUS DAILY PRN
Status: DISCONTINUED | OUTPATIENT
Start: 2025-05-28 | End: 2025-05-28 | Stop reason: HOSPADM

## 2025-05-28 RX ADMIN — CEFAZOLIN 2 G: 2 INJECTION, POWDER, FOR SOLUTION INTRAMUSCULAR; INTRAVENOUS at 07:05

## 2025-05-28 RX ADMIN — DEXMEDETOMIDINE HYDROCHLORIDE 4 MCG: 100 INJECTION, SOLUTION INTRAVENOUS at 09:05

## 2025-05-28 RX ADMIN — SUGAMMADEX 200 MG: 100 INJECTION, SOLUTION INTRAVENOUS at 08:05

## 2025-05-28 RX ADMIN — FENTANYL CITRATE 100 MCG: 50 INJECTION, SOLUTION INTRAMUSCULAR; INTRAVENOUS at 07:05

## 2025-05-28 RX ADMIN — HYDROMORPHONE HYDROCHLORIDE 0.2 MG: 1 INJECTION, SOLUTION INTRAMUSCULAR; INTRAVENOUS; SUBCUTANEOUS at 10:05

## 2025-05-28 RX ADMIN — OXYCODONE HYDROCHLORIDE 5 MG: 5 TABLET ORAL at 12:05

## 2025-05-28 RX ADMIN — PROPOFOL 160 MG: 10 INJECTION, EMULSION INTRAVENOUS at 07:05

## 2025-05-28 RX ADMIN — MIDAZOLAM HYDROCHLORIDE 2 MG: 1 INJECTION, SOLUTION INTRAMUSCULAR; INTRAVENOUS at 07:05

## 2025-05-28 RX ADMIN — LABETALOL HYDROCHLORIDE 10 MG: 5 INJECTION, SOLUTION INTRAVENOUS at 08:05

## 2025-05-28 RX ADMIN — DEXMEDETOMIDINE HYDROCHLORIDE 12 MCG: 100 INJECTION, SOLUTION INTRAVENOUS at 09:05

## 2025-05-28 RX ADMIN — ACETAMINOPHEN 1000 MG: 10 INJECTION, SOLUTION INTRAVENOUS at 07:05

## 2025-05-28 RX ADMIN — OXYCODONE HYDROCHLORIDE 5 MG: 5 TABLET ORAL at 09:05

## 2025-05-28 RX ADMIN — TRANEXAMIC ACID 1000 MG: 10 INJECTION, SOLUTION INTRAVENOUS at 07:05

## 2025-05-28 RX ADMIN — DEXMEDETOMIDINE HYDROCHLORIDE 8 MCG: 100 INJECTION, SOLUTION INTRAVENOUS at 08:05

## 2025-05-28 RX ADMIN — DEXMEDETOMIDINE HYDROCHLORIDE 12 MCG: 100 INJECTION, SOLUTION INTRAVENOUS at 08:05

## 2025-05-28 RX ADMIN — Medication 25 MG: at 07:05

## 2025-05-28 RX ADMIN — SODIUM CHLORIDE, SODIUM LACTATE, POTASSIUM CHLORIDE, AND CALCIUM CHLORIDE: .6; .31; .03; .02 INJECTION, SOLUTION INTRAVENOUS at 07:05

## 2025-05-28 RX ADMIN — HYDROMORPHONE HYDROCHLORIDE 0.2 MG: 1 INJECTION, SOLUTION INTRAMUSCULAR; INTRAVENOUS; SUBCUTANEOUS at 09:05

## 2025-05-28 RX ADMIN — Medication 120 MG: at 07:05

## 2025-05-28 RX ADMIN — DEXAMETHASONE SODIUM PHOSPHATE 8 MG: 4 INJECTION, SOLUTION INTRAMUSCULAR; INTRAVENOUS at 07:05

## 2025-05-28 RX ADMIN — ROCURONIUM BROMIDE 45 MG: 10 INJECTION, SOLUTION INTRAVENOUS at 07:05

## 2025-05-28 RX ADMIN — FAMOTIDINE 20 MG: 10 INJECTION, SOLUTION INTRAVENOUS at 07:05

## 2025-05-28 RX ADMIN — TRANEXAMIC ACID 1000 MG: 10 INJECTION, SOLUTION INTRAVENOUS at 10:05

## 2025-05-28 RX ADMIN — LORAZEPAM 0.5 MG: 2 INJECTION INTRAMUSCULAR; INTRAVENOUS at 10:05

## 2025-05-28 RX ADMIN — ONDANSETRON 4 MG: 2 INJECTION INTRAMUSCULAR; INTRAVENOUS at 08:05

## 2025-05-28 RX ADMIN — ROCURONIUM BROMIDE 5 MG: 10 INJECTION, SOLUTION INTRAVENOUS at 07:05

## 2025-05-28 RX ADMIN — Medication 25 MG: at 08:05

## 2025-05-28 RX ADMIN — LIDOCAINE HYDROCHLORIDE 50 MG: 10 INJECTION, SOLUTION EPIDURAL; INFILTRATION; INTRACAUDAL; PERINEURAL at 07:05

## 2025-05-28 NOTE — ANESTHESIA POSTPROCEDURE EVALUATION
Anesthesia Post Evaluation    Patient: Spencer Levi    Procedure(s) Performed: Procedure(s) (LRB):  ARTHROPLASTY, HIP, LEFT (Left)    Final Anesthesia Type: general      Patient location during evaluation: PACU  Patient participation: Yes- Able to Participate  Level of consciousness: awake and alert  Post-procedure vital signs: reviewed and stable  Pain management: adequate  Airway patency: patent    PONV status at discharge: No PONV  Anesthetic complications: no      Cardiovascular status: blood pressure returned to baseline and stable  Respiratory status: unassisted and room air  Hydration status: euvolemic  Follow-up not needed.              Vitals Value Taken Time   /86 05/28/25 10:45   Temp 36.9 °C (98.4 °F) 05/28/25 09:20   Pulse 62 05/28/25 10:58   Resp 16 05/28/25 10:58   SpO2 99 % 05/28/25 10:58   Vitals shown include unfiled device data.      Event Time   Out of Recovery 10:59:00         Pain/Miguelito Score: Pain Rating Prior to Med Admin: 9 (5/28/2025 10:30 AM)  Miguelito Score: 9 (5/28/2025 10:45 AM)

## 2025-05-28 NOTE — TRANSFER OF CARE
Anesthesia Transfer of Care Note    Patient: Spencer Levi    Procedure(s) Performed: Procedure(s) (LRB):  ARTHROPLASTY, HIP, LEFT (Left)    Patient location: PACU    Anesthesia Type: general    Transport from OR: Transported from OR on 2-3 L/min O2 by NC with adequate spontaneous ventilation    Post pain: adequate analgesia    Post assessment: no apparent anesthetic complications    Post vital signs: stable    Level of consciousness: awake    Nausea/Vomiting: no nausea/vomiting    Complications: none    Transfer of care protocol was followed    Last vitals: Visit Vitals  BP (!) 158/96   Pulse 69   Temp 36.5 °C (97.7 °F) (Oral)   Resp 16   SpO2 98%   Breastfeeding No

## 2025-05-28 NOTE — ANESTHESIA PREPROCEDURE EVALUATION
05/28/2025  Spencer Levi is a 59 y.o., female.      Pre-op Assessment    I have reviewed the Patient Summary Reports.     I have reviewed the Nursing Notes. I have reviewed the NPO Status.   I have reviewed the Medications.     Review of Systems  Anesthesia Hx:  No problems with previous Anesthesia             Denies Family Hx of Anesthesia complications.    Denies Personal Hx of Anesthesia complications.                    Social:  Non-Smoker Marijuana use for her hip pain      Hematology/Oncology:  Hematology Normal                                     EENT/Dental:  EENT/Dental Normal           Cardiovascular:  Cardiovascular Normal                  ECG has been reviewed. RBBB                           Pulmonary:    Asthma                    Renal/:  Renal/ Normal                 Hepatic/GI:   PUD, (Crohn's disease in remission)                  Musculoskeletal:  Musculoskeletal Normal    Severe osteoarthritis of the left hip.  Severe > 50 degree scoliosis repair as a child.            Neurological:  Neurology Normal                                      Endocrine:  Endocrine Normal            Psych:   anxiety depression                Physical Exam  General: Well nourished    Airway:  Mallampati: III   Mouth Opening: Normal  TM Distance: Normal  Tongue: Normal  Neck ROM: Normal ROM    Dental:  Partial Dentures  Top partial  Chest/Lungs:  Clear to auscultation, Normal Respiratory Rate    Heart:  Rate: Normal  Rhythm: Regular Rhythm        Anesthesia Plan  Type of Anesthesia, risks & benefits discussed:    Anesthesia Type: Gen ETT  Intra-op Monitoring Plan: Standard ASA Monitors  Post Op Pain Control Plan: IV/PO Opioids PRN and multimodal analgesia  Induction:  IV  Airway Plan: Video  Informed Consent: Informed consent signed with the Patient and all parties understand the risks and agree with anesthesia  plan.  All questions answered.   ASA Score: 3  Anesthesia Plan Notes: GETA  Multimodal analgesia with ofirmev 1000mg, ketamine 50 mg, precedex and decadron 8 mg.  PONV prophylaxis with Pepcid 20 mg IV, and Zofran 4 mg IV.        Ready For Surgery From Anesthesia Perspective.     .

## 2025-05-28 NOTE — PT/OT/SLP EVAL
Occupational Therapy   Evaluation    Name: Spencer Levi  MRN: [id number]  Admitting Diagnosis: <principal problem not specified>  Recent Surgery: Procedure(s) (LRB):  ARTHROPLASTY, HIP, LEFT (Left) * Day of Surgery *    Recommendations:     Discharge Recommendations: Low Intensity Therapy  Discharge Equipment Recommendations:  bedside commode  Barriers to discharge:  None    Assessment:     Spencer Levi is a 59 y.o. female with a medical diagnosis of <principal problem not specified>.  She presents with general weakness s/p left YU. Performance deficits affecting function: weakness, impaired endurance, impaired self care skills, impaired functional mobility, gait instability, decreased safety awareness, decreased lower extremity function, decreased upper extremity function.      Rehab Prognosis: Fair; patient would benefit from acute skilled OT services to address these deficits and reach maximum level of function.       Plan:     Patient to be seen 5 x/week to address the above listed problems via self-care/home management, therapeutic activities, therapeutic exercises  Plan of Care Expires: 06/28/25  Plan of Care Reviewed with: patient    Subjective     Chief Complaint: General weakness  Patient/Family Comments/goals: Improved functional mobility and ADL independence.    Occupational Profile:  Living Environment: lives with son and daughter in 1 story home, 5 steps, bilateral rails to enter.   Previous level of function: independent with ambulation, ADLs and IADLs.   Roles and Routines: Disabled  Equipment Used at Home: cane, straight  Assistance upon Discharge: Son and Daughter    Pain/Comfort:  Pain Rating 1: 4/10  Location - Side 1: Left  Location 1: hip  Pain Addressed 1: Reposition, Distraction  Pain Rating Post-Intervention 1: 4/10    Patients cultural, spiritual, Syn[employer] conflicts given the current situation: no    Objective:     Communicated with: nurse prior to session.  Patient found sitting in  wheelchair with telemetry, peripheral IV upon OT entry to room.    General Precautions: Standard, fall  Orthopedic Precautions: LLE non weight bearing, LLE posterior precautions  Braces: N/A  Respiratory Status: Room air    Occupational Performance:    Functional Mobility/Transfers:  Patient completed Sit <> Stand Transfer with contact guard assistance  with  rolling walker     Activities of Daily Living:  Grooming: contact guard assistance to wash face sitting in wheelchair.    Cognitive/Visual Perceptual:  Cognitive/Psychosocial Skills:     -       Oriented to: Person, Place, Time, and Situation   -       Follows Commands/attention:Follows multistep  commands  -       Communication: clear/fluent  -       Memory: No Deficits noted  -       Safety awareness/insight to disability: intact   -       Mood/Affect/Coping skills/emotional control: Cooperative and Pleasant  Visual/Perceptual:      -Intact Acuity    Physical Exam:  Balance:    -       Sitting/Standing: Contact Guard  Upper Extremity Range of Motion:     -       Right Upper Extremity: WFL  -       Left Upper Extremity: WFL  Upper Extremity Strength:    -       Right Upper Extremity: WFL  -       Left Upper Extremity: WFL   Strength:    -       Right Upper Extremity: WFL  -       Left Upper Extremity: WFL  Fine Motor Coordination:    -       Intact    AMPAC 6 Click ADL:  AMPAC Total Score: 19    Treatment & Education:  Patient provided handout and educated on left hip posterior precautions while performing ADLs and functional mobility.    Patient left sitting in wheelchair with all lines intact and call button in reach    GOALS:   Multidisciplinary Problems       Occupational Therapy Goals          Problem: Occupational Therapy    Goal Priority Disciplines Outcome Interventions   Occupational Therapy Goal     OT, PT/OT     Description: Goals to be met by: 6/28/2025     Patient will increase functional independence with ADLs by performing:    UE Dressing  with Supervision.  LE Dressing with Supervision.  Grooming while standing at sink with Supervision.  Toileting from toilet with Supervision for hygiene and clothing management.   Toilet transfer to toilet with Supervision.  Patient will perform sitting/standing ADL activity while maintaining left posterior hip precautions with no verbal cues.                          DME Justifications:   Spencer requires a commode for home use because she is confined to a single room.    History:     Past Medical History:   Diagnosis Date    Achalasia     Anxiety U    Asthma     Crohn's disease     Depression U    History of drug abuse     Menopause     Nausea & vomiting U    Peptic ulcer disease U    Scoliosis U         Past Surgical History:   Procedure Laterality Date    ABDOMINAL SURGERY      removal of colon and intestines    APPENDECTOMY      BACK SURGERY      gely    COLECTOMY      COLECTOMY  2007    COLONOSCOPY  2014    COLONOSCOPY  10/30/2017    COLONOSCOPY N/A 10/21/2019    Procedure: COLONOSCOPY;  Surgeon: Felicita Miller MD;  Location: [facility];  Service: Endoscopy;  Laterality: N/A;    ESOPHAGEAL DILATION N/A 08/05/2019    Procedure: DILATION, ESOPHAGUS;  Surgeon: Felicita Miller MD;  Location: [facility];  Service: Endoscopy;  Laterality: N/A;    ESOPHAGOGASTRODUODENOSCOPY  05/12/2014    ESOPHAGOGASTRODUODENOSCOPY N/A 08/05/2019    Procedure: EGD (ESOPHAGOGASTRODUODENOSCOPY);  Surgeon: Felicita Miller MD;  Location: [facility];  Service: Endoscopy;  Laterality: N/A;    ESOPHAGOGASTRODUODENOSCOPY (EGD) WITH DILATION  10/19/2015    INSERTION OF VENOUS ACCESS PORT  2007    TUBAL LIGATION         Time Tracking:     OT Date of Treatment: 05/28/25  OT Start Time: 1340  OT Stop Time: 1401  OT Total Time (min): 21 min    Billable Minutes:Evaluation 10  Self Care/Home Management 11    5/28/2025

## 2025-05-28 NOTE — PLAN OF CARE
Goals to be met by: 25     Patient will increase functional independence with mobility by performin. Supine to sit with Supervision  2. Sit to stand transfer with Supervision  3. Bed to chair transfer with Supervision using Rolling Walker  4. Gait  x 300 feet with Supervision using Rolling Walker.       
Pt arrived from pacu to room 14. Belongings returned. Pt drowsy. Juice and crackers given. Call light in reach. 1300 voided 400cc on bedpan. Given lunch tray. 1325 Physical therapy at bedside. 1345 ambulated in hallway with pt. Assisted to bedside chair. 1400 pt cleared for discharge from PT and OT. 1425 reviewed discharge instructions with pt and pt's family. Abd pillow and ice pack sent with pt. Pt has walker and all DME equipment at home. Prescriptions given to pt from inhouse pharmacy. Saline lock d/c'd with catheter intact. D/c'd home with family.  
40319 Exp Problem Focused - Mod. Complex

## 2025-05-28 NOTE — OP NOTE
[facility]  Surgery Department  Operative Note    SUMMARY     Date of Procedure: 5/28/2025     Procedure:  Left total hip arthroplasty    Surgeons and Role:     * Constantin Murray MD - Primary    Assisting Surgeon:  Jed    Pre-Operative Diagnosis: Primary osteoarthritis of left hip [M16.12]  Pre-op testing [Z01.818]    Post-Operative Diagnosis: Post-Op Diagnosis Codes:     * Primary osteoarthritis of left hip [M16.12]     * Pre-op testing [Z01.818]    Anesthesia: Spinal    Intraoperative Findings:  Bone-on-bone arthritis left hip    Description of the Findings of the Procedure:  Patient was placed on the operative table in the lateral decubitus position.  She was well padded and positioned.  She was prepped and draped in the usual sterile manner for surgery.  A time-out was undertaken and completed.  A posterior lateral approach undertaken to the hip and carried down sharply through the skin.  The deep fascia was incised in line with its fibers.  All bleeding was meticulously controlled.  The sciatic nerve was visualized and protected.  The Charnley retractors were placed.  The short external rotators were taken down and tagged for later reattachment.  The capsule was split and the hip was dislocated.  There was severe degenerative changes.  A preliminary head cut was made in the head was removed.  We used a cookie cutter than a canal finer then a lateralizer.  We now began reaming we reamed up to a size 7 that gave us excellent chatter.  We now broached to a 7 that gave us beautiful fit and fill.  We turned our attention to the acetabulum.  Retractors were placed anteriorly and posteriorly making sure we were not damaging any neurovascular structures.  The foveal contents were cleaned with a Bovie.  We used a 45 mm Reamer and reamed down to the level of the fovea.  We now expanded to a 53.  That gave us good bleeding bone.  We tapped our cup into position we had an outstanding Press-Fit.  We tapped  our liner into position.  I pulled on the liner it was well seated.  We irrigated.  We turned our attention back to the stem.  I dropped my 7. Stem.  We trialed with a +5 we had symmetric leg lengths and excellent stability in both flexion and extension.  I pulsed and irrigated and tapped the actual implants into position.  We irrigated again.  I closed the capsule with 2. FiberWire.  We closed the short external rotators with 2. FiberWire.  The sciatic nerve was in good position and condition and I removed the Charnley retractors.  I closed the deep fascia with a running 1. Vicryl.  I closed the subcu with 2-0 Vicryl and the skin with 4-0 Monocryl.  Sterile dressings were applied and the patient was noted to be in stable condition pending an x-ray neurovascular check    Complications: No    Estimated Blood Loss (EBL): * No values recorded between 5/28/2025  7:13 AM and 5/28/2025  8:59 AM *           Implants:   Implant Name Type Inv. Item Serial No.  Lot No. LRB No. Used Action   CUP 54MM - [id number]  CUP 54MM  [employer] [id number] Left 1 Implanted   LINER ACET PNCL ALT 10 +4 36X5 - [id number]  LINER ACET PNCL ALT 10 +4 36X5  [employer] [id number] Left 1 Implanted   STEM FEM STD OFFSET SZ 7 - [id number]  STEM FEM STD OFFSET SZ 7  [employer] [id number] Left 1 Implanted   Biolox delta ceramic femoral head     [id number] Left 1 Implanted       Specimens:   Specimen (24h ago, onward)      None                    Condition: Good    Disposition: PACU - hemodynamically stable.              Discharge Note    SUMMARY     Admit Date: 5/28/2025    Discharge Date and Time:  05/28/2025 8:59 AM    Hospital Course (synopsis of major diagnoses, care, treatment, and services provided during the course of the hospital stay): Uneventful      Final Diagnosis: Post-Op Diagnosis Codes:     * Primary osteoarthritis of left hip [M16.12]     * Pre-op testing [Z01.818]    Disposition: Home or Self Care    Follow Up/Patient Instructions:      Medications:  Reconciled Home Medications:   Current Discharge Medication List        CONTINUE these medications which have NOT CHANGED    Details   clonazePAM (KLONOPIN) 0.5 MG tablet Take 0.5 mg by mouth 2 (two) times daily. At bedtme      lisinopriL (PRINIVIL,ZESTRIL) 20 MG tablet Take 20 mg by mouth.      multivitamin (THERAGRAN) per tablet Take 1 tablet by mouth once daily.      vitamin D (VITAMIN D3) 1000 units Tab cholecalciferol (vitamin D3) 25 mcg (1,000 unit) tablet   Take 1 tablet every day by oral route in the morning for 30 days.      adalimumab (HUMIRA) 10 mg/0.2 mL SyKt Inject 40 mg into the skin every 14 (fourteen) days.      aspirin (ECOTRIN) 81 MG EC tablet Take 1 tablet (81 mg total) by mouth every 12 (twelve) hours.  Qty: 60 tablet, Refills: 0    Associated Diagnoses: Primary osteoarthritis of left hip      celecoxib (CELEBREX) 200 MG capsule Take 1 capsule (200 mg total) by mouth 2 (two) times daily.  Qty: 60 capsule, Refills: 0    Associated Diagnoses: Primary osteoarthritis of left hip      docusate sodium (COLACE) 100 MG capsule Take 1 capsule (100 mg total) by mouth 2 (two) times daily.  Qty: 60 capsule, Refills: 0    Associated Diagnoses: Primary osteoarthritis of left hip      gabapentin (NEURONTIN) 300 MG capsule Take 1 capsule (300 mg total) by mouth 2 (two) times daily.  Qty: 60 capsule, Refills: 0    Associated Diagnoses: Primary osteoarthritis of left hip      ondansetron (ZOFRAN) 4 MG tablet Take 1 tablet (4 mg total) by mouth every 8 (eight) hours as needed for Nausea.  Qty: 30 tablet, Refills: 0    Associated Diagnoses: Primary osteoarthritis of left hip      oxyCODONE-acetaminophen (PERCOCET) 7.5-325 mg per tablet Take 1 tablet by mouth every 6 (six) hours as needed for Pain.  Qty: 28 tablet, Refills: 0    Comments: This prescription and the attached prescriptions are for postoperative use for the patient's scheduled total joint arthroplasty on Wednesday May 28, 2025.  This is  for the meds to bed program.  Associated Diagnoses: Primary osteoarthritis of left hip           Discharge Procedure Orders   Diet general     Call MD for:  temperature >100.4     Call MD for:  persistent nausea and vomiting     Call MD for:  severe uncontrolled pain     Call MD for:  difficulty breathing, headache or visual disturbances     Call MD for:  redness, tenderness, or signs of infection (pain, swelling, redness, odor or green/yellow discharge around incision site)     Call MD for:  hives     Call MD for:  persistent dizziness or light-headedness     Call MD for:  extreme fatigue

## 2025-05-28 NOTE — PT/OT/SLP EVAL
Physical Therapy Evaluation    Patient Name:  Spencer Levi   MRN:  [id number]    Recommendations:     Discharge Recommendations: Low Intensity Therapy  Discharge Equipment Recommendations: none    Barriers to discharge: medical needs.    Assessment:     Spencer Levi is a 59 y.o. female admitted with a medical diagnosis of <principal problem not specified>.  She presents with the following impairments/functional limitations: weakness, impaired endurance, impaired functional mobility, gait instability, pain, orthopedic precautions. Pt found HOB on stretcher in PACU and agreeable to working with PT. Pt A & O x  4 and has the following co-morbidities: Crohn's Disease.  Pt tolerated session well and required minimum assistance with L LE to transition to sitting EOB and CGA for safety for safe mobilization during session today. Pt would benefit from acute PT during hospitalization to increase strength, endurance and safety with mobility and would benefit from Low Intensity Therapy upon discharge home.      Rehab Prognosis: Good; patient would benefit from acute skilled PT services to address these deficits and reach maximum level of function.    Recent Surgery: Procedure(s) (LRB):  ARTHROPLASTY, HIP, LEFT (Left) * Day of Surgery *    Plan:     During this hospitalization, patient to be seen daily to address the identified rehab impairments via gait training, therapeutic activities, therapeutic exercises and progress toward the following goals:    Plan of Care Expires:  06/04/25    Subjective     Chief Complaint: pain L hip 7-8/10  Patient/Family Comments/goals: Return to prior level of functional mobility.    Pain/Comfort:  Pain Rating 1: 7/10 (Simultaneous filing. User may not have seen previous data.)  Location - Side 1: Left (Simultaneous filing. User may not have seen previous data.)  Location 1: hip (Simultaneous filing. User may not have seen previous data.)  Pain Addressed 1: Reposition, Pre-medicate for activity,  Distraction, Cessation of Activity, Nurse notified (Simultaneous filing. User may not have seen previous data.)  Pain Rating Post-Intervention 1: 8/10 (Simultaneous filing. User may not have seen previous data.)    Patients cultural, spiritual, [employer] conflicts given the current situation:      Living Environment:  Pt lives in a [facility] with 5 ALEXANDRE (son & daughter live with pt)  Prior to admission, patients level of function was Independent to MI with SC, but with pain and limited ROM.  Equipment used at home: cane, straight, walker, rolling, bedside commode (Simultaneous filing. User may not have seen previous data.).  DME owned (not currently used): none.  Upon discharge, patient will have assistance from her adult children & a friend.    Objective:     Communicated with RN Hien prior to session.  Patient found HOB elevated with peripheral IV (Simultaneous filing. User may not have seen previous data.)  upon PT entry to room.    General Precautions: Standard, fall (Simultaneous filing. User may not have seen previous data.)  Orthopedic Precautions:LLE weight bearing as tolerated, LLE posterior precautions (Simultaneous filing. User may not have seen previous data.)   Braces: N/A (Simultaneous filing. User may not have seen previous data.)  Respiratory Status: Room air    Exams:  Cognitive Exam:  Patient is oriented to Person, Place, Time, and Situation  RLE ROM: WFL  RLE Strength: WFL  LLE ROM: WFL  LLE Strength: NT    Functional Mobility:  Bed Mobility:     Scooting: minimum assistance and for L LE  Supine to Sit: minimum assistance and for L LE  Transfers:     Sit to Stand:  contact guard assistance and vc with rolling walker  Gait: x 100' with rolling walker and CGA for safety and vc for sequencing   Stairs:  Pt ascended/descended 6 stair(s) with single HHA with single handrail with Minimal Assistance and vc for sequencing.       AM-PAC 6 CLICK MOBILITY  Total Score:21       Treatment & Education:  Pt was  educated on the following: call light use, importance of OOB activity and functional mobility to negate the negative effects of prolonged bed rest during this hospitalization, safe transfers/ambulation and discharge planning recommendations/options.  PT reviewed and demonstrated YU precautions with pt prior to mobility and during mobility.      Patient left sitting in a wheelchair with call button in reach and OT Kevin present.    GOALS:   Multidisciplinary Problems       Physical Therapy Goals          Problem: Physical Therapy    Goal Priority Disciplines Outcome Interventions   Physical Therapy Goal     PT, PT/OT     Description: Goals to be met by: 25     Patient will increase functional independence with mobility by performin. Supine to sit with Supervision  2. Sit to stand transfer with Supervision  3. Bed to chair transfer with Supervision using Rolling Walker  4. Gait  x 300 feet with Supervision using Rolling Walker.                              DME Justifications:  No DME recommended requiring DME justifications    History:     Past Medical History:   Diagnosis Date    Achalasia     Anxiety U    Asthma     Crohn's disease     Depression U    History of drug abuse     Menopause     Nausea & vomiting U    Peptic ulcer disease U    Scoliosis U       Past Surgical History:   Procedure Laterality Date    ABDOMINAL SURGERY      removal of colon and intestines    APPENDECTOMY      BACK SURGERY      gely    COLECTOMY      COLECTOMY      COLONOSCOPY      COLONOSCOPY  10/30/2017    COLONOSCOPY N/A 10/21/2019    Procedure: COLONOSCOPY;  Surgeon: Felicita Miller MD;  Location: [facility];  Service: Endoscopy;  Laterality: N/A;    ESOPHAGEAL DILATION N/A 2019    Procedure: DILATION, ESOPHAGUS;  Surgeon: Felicita Miller MD;  Location: [facility];  Service: Endoscopy;  Laterality: N/A;    ESOPHAGOGASTRODUODENOSCOPY  2014    ESOPHAGOGASTRODUODENOSCOPY N/A 2019    Procedure: EGD  (ESOPHAGOGASTRODUODENOSCOPY);  Surgeon: Felicita Miller MD;  Location: [facility];  Service: Endoscopy;  Laterality: N/A;    ESOPHAGOGASTRODUODENOSCOPY (EGD) WITH DILATION  10/19/2015    INSERTION OF VENOUS ACCESS PORT  2007    TUBAL LIGATION         Time Tracking:     PT Received On: 05/28/25  PT Start Time: 1315     PT Stop Time: 1345  PT Total Time (min): 30 min     Billable Minutes: Evaluation 10 and Gait Training 20 05/28/2025

## 2025-05-29 PROCEDURE — G0180 MD CERTIFICATION HHA PATIENT: HCPCS | Mod: ,,, | Performed by: ORTHOPAEDIC SURGERY

## 2025-06-06 ENCOUNTER — TELEPHONE (OUTPATIENT)
Dept: ORTHOPEDICS | Facility: CLINIC | Age: 60
End: 2025-06-06

## 2025-06-06 ENCOUNTER — CLINICAL SUPPORT (OUTPATIENT)
Dept: ORTHOPEDICS | Facility: CLINIC | Age: 60
End: 2025-06-06
Payer: MEDICAID

## 2025-06-06 VITALS
SYSTOLIC BLOOD PRESSURE: 168 MMHG | BODY MASS INDEX: 25.63 KG/M2 | DIASTOLIC BLOOD PRESSURE: 88 MMHG | WEIGHT: 173.06 LBS | HEIGHT: 69 IN

## 2025-06-06 DIAGNOSIS — Z96.642 S/P TOTAL LEFT HIP ARTHROPLASTY: Primary | ICD-10-CM

## 2025-06-06 PROCEDURE — 99213 OFFICE O/P EST LOW 20 MIN: CPT | Mod: PBBFAC,PN

## 2025-06-06 PROCEDURE — 99999 PR PBB SHADOW E&M-EST. PATIENT-LVL III: CPT | Mod: PBBFAC,,,

## 2025-06-10 ENCOUNTER — OFFICE VISIT (OUTPATIENT)
Dept: ORTHOPEDICS | Facility: CLINIC | Age: 60
End: 2025-06-10
Payer: MEDICAID

## 2025-06-10 ENCOUNTER — HOSPITAL ENCOUNTER (OUTPATIENT)
Dept: RADIOLOGY | Facility: HOSPITAL | Age: 60
Discharge: HOME | End: 2025-06-10
Attending: ORTHOPAEDIC SURGERY
Payer: MEDICAID

## 2025-06-10 VITALS — HEIGHT: 69 IN | BODY MASS INDEX: 25.63 KG/M2 | WEIGHT: 173.06 LBS

## 2025-06-10 DIAGNOSIS — Z96.642 S/P TOTAL LEFT HIP ARTHROPLASTY: Primary | ICD-10-CM

## 2025-06-10 DIAGNOSIS — M16.12 PRIMARY OSTEOARTHRITIS OF LEFT HIP: ICD-10-CM

## 2025-06-10 PROCEDURE — 72170 X-RAY EXAM OF PELVIS: CPT | Mod: 26,,, | Performed by: RADIOLOGY

## 2025-06-10 PROCEDURE — 99024 POSTOP FOLLOW-UP VISIT: CPT | Mod: ,,, | Performed by: ORTHOPAEDIC SURGERY

## 2025-06-10 PROCEDURE — 99999 PR PBB SHADOW E&M-EST. PATIENT-LVL III: CPT | Mod: PBBFAC,,, | Performed by: ORTHOPAEDIC SURGERY

## 2025-06-10 PROCEDURE — 1160F RVW MEDS BY RX/DR IN RCRD: CPT | Mod: CPTII,,, | Performed by: ORTHOPAEDIC SURGERY

## 2025-06-10 PROCEDURE — 99213 OFFICE O/P EST LOW 20 MIN: CPT | Mod: PBBFAC,25,PN | Performed by: ORTHOPAEDIC SURGERY

## 2025-06-10 PROCEDURE — 72170 X-RAY EXAM OF PELVIS: CPT | Mod: TC,PN

## 2025-06-10 PROCEDURE — 1159F MED LIST DOCD IN RCRD: CPT | Mod: CPTII,,, | Performed by: ORTHOPAEDIC SURGERY

## 2025-06-10 PROCEDURE — 4010F ACE/ARB THERAPY RXD/TAKEN: CPT | Mod: CPTII,,, | Performed by: ORTHOPAEDIC SURGERY

## 2025-06-10 RX ORDER — OXYCODONE AND ACETAMINOPHEN 7.5; 325 MG/1; MG/1
1 TABLET ORAL EVERY 6 HOURS PRN
Qty: 28 TABLET | Refills: 0 | Status: SHIPPED | OUTPATIENT
Start: 2025-06-10

## 2025-06-10 NOTE — PROGRESS NOTES
[facility] POST-OP NOTE    Subjective:           Chief Complaint:   Chief Complaint   Patient presents with    Left Hip - Post-op Evaluation      L YU 05/28/25, thinks she twisted her hip and messed something up. Has restless leg syndrome, may be twisting legs in her sleep. Pain is located in back of thigh to the knee (feels very sore). Is having pain in her L groin, has had a few occasions where she heard and felt a pop in her hip. With sitting, legs goes numb from just below buttock to toes.        Past Medical History:   Diagnosis Date    Achalasia     Anxiety U    Asthma     Crohn's disease     Depression U    History of drug abuse     Menopause     Nausea & vomiting U    Peptic ulcer disease U    Scoliosis U       Past Surgical History:   Procedure Laterality Date    ABDOMINAL SURGERY      removal of colon and intestines    APPENDECTOMY      BACK SURGERY      gely    COLECTOMY      COLECTOMY  2007    COLONOSCOPY  2014    COLONOSCOPY  10/30/2017    COLONOSCOPY N/A 10/21/2019    Procedure: COLONOSCOPY;  Surgeon: Felicita Miller MD;  Location: [facility];  Service: Endoscopy;  Laterality: N/A;    ESOPHAGEAL DILATION N/A 08/05/2019    Procedure: DILATION, ESOPHAGUS;  Surgeon: Felicita Miller MD;  Location: [facility];  Service: Endoscopy;  Laterality: N/A;    ESOPHAGOGASTRODUODENOSCOPY  05/12/2014    ESOPHAGOGASTRODUODENOSCOPY N/A 08/05/2019    Procedure: EGD (ESOPHAGOGASTRODUODENOSCOPY);  Surgeon: Felicita Miller MD;  Location: [facility];  Service: Endoscopy;  Laterality: N/A;    ESOPHAGOGASTRODUODENOSCOPY (EGD) WITH DILATION  10/19/2015    HIP ARTHROPLASTY Left 5/28/2025    Procedure: ARTHROPLASTY, HIP, LEFT;  Surgeon: Constantin Murray MD;  Location: [facility];  Service: Orthopedics;  Laterality: Left;  Dago    INSERTION OF VENOUS ACCESS PORT  2007    TUBAL LIGATION         Current Outpatient Medications   Medication Sig    adalimumab (HUMIRA) 10 mg/0.2 mL SyKt Inject 40 mg into the skin every 14  (fourteen) days.    aspirin (ECOTRIN) 81 MG EC tablet Take 1 tablet (81 mg total) by mouth every 12 (twelve) hours.    celecoxib (CELEBREX) 200 MG capsule Take 1 capsule (200 mg total) by mouth 2 (two) times daily.    clonazePAM (KLONOPIN) 0.5 MG tablet Take 0.5 mg by mouth 2 (two) times daily. At bedtme    docusate sodium (COLACE) 100 MG capsule Take 1 capsule (100 mg total) by mouth 2 (two) times daily.    gabapentin (NEURONTIN) 300 MG capsule Take 1 capsule (300 mg total) by mouth 2 (two) times daily.    lisinopriL (PRINIVIL,ZESTRIL) 20 MG tablet Take 20 mg by mouth.    multivitamin (THERAGRAN) per tablet Take 1 tablet by mouth once daily.    ondansetron (ZOFRAN) 4 MG tablet Take 1 tablet (4 mg total) by mouth every 8 (eight) hours as needed for Nausea.    oxyCODONE-acetaminophen (PERCOCET) 7.5-325 mg per tablet Take 1 tablet by mouth every 6 (six) hours as needed for Pain.    vitamin D (VITAMIN D3) 1000 units Tab cholecalciferol (vitamin D3) 25 mcg (1,000 unit) tablet   Take 1 tablet every day by oral route in the morning for 30 days.     No current facility-administered medications for this visit.       Review of patient's allergies indicates:   Allergen Reactions    Morphine Hives     No benefit      Azathioprine Rash     Pancreatitis      Codeine Rash       Family History   Problem Relation Name Age of Onset    Breast cancer Maternal Aunt      Breast cancer Maternal Grandmother         Social History[1]    History of present illness:  Spencer comes in today for follow-up for her left total hip arthroplasty.  She has concerns that she may have dislocated her hip when she is twisting/moving while in his shower.  No real specific injury or trauma.  Does complain of left lower extremity numbness and tingling going all the way to the foot.  She does have a previous history of corrective scoliosis spine surgery as a child.    Review of Systems:    Musculoskeletal:  See HPI      Objective:        Physical  "Examination:    Vital Signs:  There were no vitals filed for this visit.    Body mass index is 25.56 kg/m².    This a well-developed, well nourished patient in no acute distress.  They are alert and oriented and cooperative to examination.        Left hip exam: Skin to left hip clean dry and intact.  No erythema or ecchymosis.  No signs or symptoms of infection.  Neurovascularly intact throughout the left lower extremity.  Negative Homans on the left.  Well-preserved internal/external rotation of the left hip without pain.  Straight leg raise maneuver on the left.  She weightbear as tolerated left lower extremity.  She continues with a slow ivan and antalgic gait.  She ambulates with a single prong cane.      Pertinent New Results:    XRAY Report / Interpretation:   Two views taken pelvis today 2 different AP pelvis views.  No acute fractures or dislocations seen.  She has a left total hip arthroplasty system in place without evidence of dislocation.  Femoral head is seated in the acetabulum.    Assessment/Plan:      1. Status post left total hip arthroplasty.      Reassurance provided to the patient today.  She may have actually subluxed the left hip joint when she was pivoting/twisting in his shower.  It is hard to really know.  She has not dislocated today.  Continue working with PT.  We did review total hip replacement precautions, specifically using her abduction pillow while sleeping for least 6 weeks postop.  We will also discuss no leg crossing our flexion at the hip beyond 90° for least 3 months postop.  We will see her at her regular scheduled follow up visit in 4-6 weeks.    Alexis Be, Physician Assistant, served in the capacity as a "scribe" for this patient encounter.  A "face-to-face" encounter occurred with Dr. Constantin Murray on this date.  The treatment plan and medical decision-making is outlined above. Patient was seen and examined with a chaperone.     This note was created using " Get[employer] voice recognition software that occasionally misinterpreted phrases or words.             [1]   Social History  Socioeconomic History    Marital status: Single   Tobacco Use    Smoking status: Former     Average packs/day: 0.3 packs/day for 40.0 years (10.0 ttl pk-yrs)     Types: Cigarettes     Start date: 11/1983    Smokeless tobacco: Never   Substance and Sexual Activity    Alcohol use: Not Currently     Alcohol/week: 10.0 standard drinks of alcohol     Types: 10 Glasses of wine per week     Comment: socially    Drug use: Yes     Frequency: 14.0 times per week     Types: Marijuana     Comment: daily    Sexual activity: Yes     Partners: Male     Birth control/protection: Condom     Social Drivers of Health     Food Insecurity: Patient Declined (4/6/2024)    Received from [facility]    Hunger Vital Sign     Worried About Running Out of Food in the Last Year: Patient declined     Ran Out of Food in the Last Year: Patient declined   Transportation Needs: Patient Declined (4/6/2024)    Received from [facility]    PRAPARE - Transportation     Lack of Transportation (Medical): Patient declined     Lack of Transportation (Non-Medical): Patient declined

## 2025-06-12 ENCOUNTER — EXTERNAL HOME HEALTH (OUTPATIENT)
Dept: HOME HEALTH SERVICES | Facility: HOSPITAL | Age: 60
End: 2025-06-12
Payer: MEDICAID

## 2025-06-26 ENCOUNTER — TELEPHONE (OUTPATIENT)
Dept: ORTHOPEDICS | Facility: CLINIC | Age: 60
End: 2025-06-26
Payer: MEDICAID

## 2025-06-26 DIAGNOSIS — Z96.642 STATUS POST TOTAL HIP REPLACEMENT, LEFT: ICD-10-CM

## 2025-06-26 RX ORDER — OXYCODONE AND ACETAMINOPHEN 5; 325 MG/1; MG/1
1 TABLET ORAL EVERY 8 HOURS PRN
Qty: 21 TABLET | Refills: 0 | Status: SHIPPED | OUTPATIENT
Start: 2025-06-26

## 2025-06-26 RX ORDER — OXYCODONE AND ACETAMINOPHEN 7.5; 325 MG/1; MG/1
1 TABLET ORAL EVERY 8 HOURS PRN
Qty: 21 TABLET | Refills: 0 | Status: CANCELLED | OUTPATIENT
Start: 2025-06-26

## 2025-06-26 NOTE — TELEPHONE ENCOUNTER
----- Message from Rena sent at 6/26/2025 10:40 AM CDT -----  Phone #: [phone]  Patient is requesting a refill of oxyCODONE-acetaminophen (PERCOCET) 7.5-325 mg per tablet            Pharmacy:   [facility] [address] [facility] [address]  [address] [address] [address]  Phone: [phone] Fax: [phone]

## 2025-07-02 ENCOUNTER — HOSPITAL ENCOUNTER (EMERGENCY)
Facility: HOSPITAL | Age: 60
Discharge: HOME | End: 2025-07-02
Attending: EMERGENCY MEDICINE
Payer: MEDICAID

## 2025-07-02 VITALS
SYSTOLIC BLOOD PRESSURE: 130 MMHG | OXYGEN SATURATION: 98 % | WEIGHT: 184 LBS | HEIGHT: 69 IN | DIASTOLIC BLOOD PRESSURE: 80 MMHG | TEMPERATURE: 98 F | BODY MASS INDEX: 27.25 KG/M2 | RESPIRATION RATE: 16 BRPM | HEART RATE: 70 BPM

## 2025-07-02 DIAGNOSIS — M79.605 LEFT LEG PAIN: Primary | ICD-10-CM

## 2025-07-02 PROCEDURE — 99284 EMERGENCY DEPT VISIT MOD MDM: CPT | Mod: 25

## 2025-07-02 NOTE — ED PROVIDER NOTES
Encounter Date: 7/2/2025       History     Chief Complaint   Patient presents with    Leg Pain     LEFT THIGH THIS AM, S/P HIP REPLACEMENT IN MAY     This is a 60-year-old female with history of asthma, anxiety, Crohn's, peptic ulcer disease, left hip replacement in May comes in complaining of left leg pain.  Patient reports that she felt a shooting pain and pressure in the middle of her thigh today.  It was unrelated to any activity.  She has not noticed any swelling.  She reports that she was concerned about a blood clot and came to the ER.  She denies any difficulty ambulating.  No history of trauma.  She denies any hip pain.  No other complaints.      Review of patient's allergies indicates:   Allergen Reactions    Morphine Hives     No benefit      Azathioprine Rash     Pancreatitis      Codeine Rash     Past Medical History:   Diagnosis Date    Achalasia     Anxiety U    Asthma     Crohn's disease     Depression U    History of drug abuse     Menopause     Nausea & vomiting U    Peptic ulcer disease U    Scoliosis U     Past Surgical History:   Procedure Laterality Date    ABDOMINAL SURGERY      removal of colon and intestines    APPENDECTOMY      BACK SURGERY      gely    COLECTOMY      COLECTOMY  2007    COLONOSCOPY  2014    COLONOSCOPY  10/30/2017    COLONOSCOPY N/A 10/21/2019    Procedure: COLONOSCOPY;  Surgeon: Felicita Miller MD;  Location: [facility];  Service: Endoscopy;  Laterality: N/A;    ESOPHAGEAL DILATION N/A 08/05/2019    Procedure: DILATION, ESOPHAGUS;  Surgeon: Felicita Miller MD;  Location: [facility];  Service: Endoscopy;  Laterality: N/A;    ESOPHAGOGASTRODUODENOSCOPY  05/12/2014    ESOPHAGOGASTRODUODENOSCOPY N/A 08/05/2019    Procedure: EGD (ESOPHAGOGASTRODUODENOSCOPY);  Surgeon: Felicita Miller MD;  Location: [facility];  Service: Endoscopy;  Laterality: N/A;    ESOPHAGOGASTRODUODENOSCOPY (EGD) WITH DILATION  10/19/2015    HIP ARTHROPLASTY Left 5/28/2025    Procedure: ARTHROPLASTY, HIP,  LEFT;  Surgeon: Constantin Murray MD;  Location: [facility];  Service: Orthopedics;  Laterality: Left;  Dago    INSERTION OF VENOUS ACCESS PORT  2007    TUBAL LIGATION       Family History   Problem Relation Name Age of Onset    Breast cancer Maternal Aunt      Breast cancer Maternal Grandmother       Social History[1]  Review of Systems   Constitutional:  Negative for chills and fever.   HENT:  Negative for congestion, sore throat and trouble swallowing.    Respiratory:  Negative for cough and shortness of breath.    Cardiovascular:  Negative for chest pain and palpitations.   Gastrointestinal:  Negative for abdominal pain, diarrhea, nausea and vomiting.   Genitourinary:  Negative for dysuria and flank pain.   Musculoskeletal:  Negative for back pain and neck pain.        Leg pain as per HPI   Neurological:  Negative for weakness, numbness and headaches.   Psychiatric/Behavioral:  Negative for agitation and confusion.    All other systems reviewed and are negative.      Physical Exam     Initial Vitals [07/02/25 1106]   BP Pulse Resp Temp SpO2   133/86 68 19 98.8 °F (37.1 °C) 98 %      MAP       --         Physical Exam    Nursing note and vitals reviewed.  Constitutional: Vital signs are normal. She appears well-developed and well-nourished.  Non-toxic appearance. No distress.   HENT:   Head: Normocephalic and atraumatic. Mouth/Throat: Oropharynx is clear and moist.   Eyes: EOM are normal. Pupils are equal, round, and reactive to light.   Neck: Neck supple.   Cardiovascular:  Normal rate, regular rhythm and intact distal pulses.           Pulmonary/Chest: Breath sounds normal. She has no wheezes.   Musculoskeletal:         General: Normal range of motion.      Cervical back: Neck supple. No rigidity. No muscular tenderness.      Comments: Healed incision noted to the left hip.  Mild tenderness to palpation to medial thigh.  No popliteal tenderness to palpation.  2+ distal pulses.  Full range of motion.      Lymphadenopathy:     She has no cervical adenopathy.     She has no axillary adenopathy.   Neurological: She is alert and oriented to person, place, and time. She has normal strength. No cranial nerve deficit or sensory deficit. Gait normal.   Skin: Skin is warm, dry and intact.   Psychiatric: She has a normal mood and affect. Her behavior is normal.         ED Course   Procedures  Labs Reviewed - No data to display       Imaging Results              X-Ray Hip 2 or 3 views Left with Pelvis when performed (Final result)  Result time 07/02/25 12:06:44      Final result by Armani Brian MD (07/02/25 12:06:44)                   Impression:      No acute left hip abnormality or complicating feature of left total hip arthroplasty.      Electronically signed by: Armani Brian  Date:    07/02/2025  Time:    12:06               Narrative:    EXAMINATION:  XR HIP WITH PELVIS WHEN PERFORMED 2 OR 3 VIEWS LEFT    CLINICAL HISTORY:  pain;    FINDINGS:  AP pelvis and two views of left hip compared with 05/28/2025 show no fracture, dislocation, or destructive osseous lesion. Left total hip arthroplasty remains in place without loosening or other complication.  Severe right hip osteoarthrosis unchanged.  Pubic symphysis and sacroiliac joints are maintained.  Soft tissues are unremarkable.                                       US Lower Extremity Veins Left (Final result)  Result time 07/02/25 11:55:18      Final result by Diana Armsa IV, MD (07/02/25 11:55:18)                   Impression:      No findings of deep venous thrombosis involving the left lower extremity.      Electronically signed by: Diana Armas  Date:    07/02/2025  Time:    11:55               Narrative:    EXAMINATION:  US LOWER EXTREMITY VEINS LEFT    CLINICAL HISTORY:  Pain, unspecified    COMPARISON:  None.    FINDINGS:  Real-time, duplex and color Doppler interrogation of the left lower extremity deep venous system is performed.  This demonstrates patency  of the common and superficial femoral veins, greater saphenous vein, popliteal vein and major calf veins. There are no findings of deep vein thrombosis.                                       Medications - No data to display  Medical Decision Making  This is a 60-year-old female with history of asthma, anxiety, Crohn's, peptic ulcer disease, left hip replacement in May comes in complaining of left leg pain.  On evaluation patient's vitals are stable.  On physical exam she has tenderness to palpation to her medial thigh.  She has no leg swelling.  She has no warmth or erythema.  She has full range of motion.  She is neurovascularly intact.    Orders included x-rays of the hip as well as DVT study.    Comorbidities contributing to patient's presentation include history of asthma, anxiety, Crohn's, peptic ulcer disease.  Acute exacerbation of chronic illness: None    I reviewed patient's external medical notes.  She was last seen by Orthopedics 3 weeks ago postop.  At that time she had also been complaining of some left groin pain.  Her exam at that time was unremarkable.    Differential diagnosis includes musculoskeletal strain, groin strain, hardware disruption, DVT.    Amount and/or Complexity of Data Reviewed  External Data Reviewed: labs, radiology and notes.     Details: As detailed above.  Radiology: ordered and independent interpretation performed.     Details: X-rays of the hip were independently reviewed by me and showed no hardware disruption.  DVT study was read by Radiology as negative.    Risk  Prescription drug management.  Decision regarding hospitalization.  Risk Details: MDM continued:  Patient's workup is unremarkable.  She has no evidence of DVT or fracture.  She is ambulating without difficulty.  She was discharged with follow-up with Orthopedics.  She is to return to the ER for any concerns.    Social determinants of health:  Patient has orthopedics follow-up scheduled.                                       Clinical Impression:  Final diagnoses:  [M79.605] Left leg pain (Primary)          ED Disposition Condition    Discharge Stable          ED Prescriptions    None       Follow-up Information       Follow up With Specialties Details Why Contact Info    Dr. uMrray  In 1 week                     [1]   Social History  Tobacco Use    Smoking status: Former     Average packs/day: 0.3 packs/day for 40.0 years (10.0 ttl pk-yrs)     Types: Cigarettes     Start date: 11/1983    Smokeless tobacco: Never   Substance Use Topics    Alcohol use: Not Currently     Alcohol/week: 10.0 standard drinks of alcohol     Types: 10 Glasses of wine per week     Comment: socially    Drug use: Yes     Frequency: 14.0 times per week     Types: Marijuana     Comment: daily        Ivana Sidhu MD  07/02/25 [id number]

## 2025-07-03 ENCOUNTER — OFFICE VISIT (OUTPATIENT)
Dept: ORTHOPEDICS | Facility: CLINIC | Age: 60
End: 2025-07-03
Payer: MEDICAID

## 2025-07-03 VITALS — HEIGHT: 69 IN | WEIGHT: 184.06 LBS | BODY MASS INDEX: 27.26 KG/M2

## 2025-07-03 DIAGNOSIS — Z01.818 PRE-OP TESTING: ICD-10-CM

## 2025-07-03 DIAGNOSIS — M16.11 PRIMARY OSTEOARTHRITIS OF RIGHT HIP: Primary | ICD-10-CM

## 2025-07-03 DIAGNOSIS — Z96.642 STATUS POST TOTAL HIP REPLACEMENT, LEFT: Primary | ICD-10-CM

## 2025-07-03 DIAGNOSIS — M16.11 PRIMARY OSTEOARTHRITIS OF RIGHT HIP: ICD-10-CM

## 2025-07-03 DIAGNOSIS — M16.12 PRIMARY OSTEOARTHRITIS OF LEFT HIP: ICD-10-CM

## 2025-07-03 PROCEDURE — 99024 POSTOP FOLLOW-UP VISIT: CPT | Mod: ,,, | Performed by: ORTHOPAEDIC SURGERY

## 2025-07-03 PROCEDURE — 1159F MED LIST DOCD IN RCRD: CPT | Mod: CPTII,,, | Performed by: ORTHOPAEDIC SURGERY

## 2025-07-03 PROCEDURE — 1160F RVW MEDS BY RX/DR IN RCRD: CPT | Mod: CPTII,,, | Performed by: ORTHOPAEDIC SURGERY

## 2025-07-03 PROCEDURE — 99213 OFFICE O/P EST LOW 20 MIN: CPT | Mod: PBBFAC,PN | Performed by: ORTHOPAEDIC SURGERY

## 2025-07-03 PROCEDURE — 4010F ACE/ARB THERAPY RXD/TAKEN: CPT | Mod: CPTII,,, | Performed by: ORTHOPAEDIC SURGERY

## 2025-07-03 PROCEDURE — 99999 PR PBB SHADOW E&M-EST. PATIENT-LVL III: CPT | Mod: PBBFAC,,, | Performed by: ORTHOPAEDIC SURGERY

## 2025-07-03 RX ORDER — OXYCODONE AND ACETAMINOPHEN 7.5; 325 MG/1; MG/1
1 TABLET ORAL EVERY 8 HOURS PRN
Qty: 21 TABLET | Refills: 0 | Status: SHIPPED | OUTPATIENT
Start: 2025-07-03 | End: 2025-07-10

## 2025-07-03 RX ORDER — CEFAZOLIN SODIUM 2 G/50ML
2 SOLUTION INTRAVENOUS
OUTPATIENT
Start: 2025-07-03

## 2025-07-03 NOTE — PROGRESS NOTES
[facility]    Subjective:     Chief Complaint:   Chief Complaint   Patient presents with    Left Hip - Post-op Evaluation     Follow up for PO Left YU 5/28/25, states she is having some 'strange' pains going on. Was at PT yesterday and they sent her to the ER due to the pain, ER states she does not have any blood clots. The patient states the pain just feels like a hard nagging pressure        Past Medical History:   Diagnosis Date    Achalasia     Anxiety U    Asthma     Crohn's disease     Depression U    History of drug abuse     Menopause     Nausea & vomiting U    Peptic ulcer disease U    Scoliosis U       Past Surgical History:   Procedure Laterality Date    ABDOMINAL SURGERY      removal of colon and intestines    APPENDECTOMY      BACK SURGERY      gely    COLECTOMY      COLECTOMY  2007    COLONOSCOPY  2014    COLONOSCOPY  10/30/2017    COLONOSCOPY N/A 10/21/2019    Procedure: COLONOSCOPY;  Surgeon: Felicita Miller MD;  Location: [facility];  Service: Endoscopy;  Laterality: N/A;    ESOPHAGEAL DILATION N/A 08/05/2019    Procedure: DILATION, ESOPHAGUS;  Surgeon: Felicita Miller MD;  Location: [facility];  Service: Endoscopy;  Laterality: N/A;    ESOPHAGOGASTRODUODENOSCOPY  05/12/2014    ESOPHAGOGASTRODUODENOSCOPY N/A 08/05/2019    Procedure: EGD (ESOPHAGOGASTRODUODENOSCOPY);  Surgeon: Felicita Miller MD;  Location: [facility];  Service: Endoscopy;  Laterality: N/A;    ESOPHAGOGASTRODUODENOSCOPY (EGD) WITH DILATION  10/19/2015    HIP ARTHROPLASTY Left 5/28/2025    Procedure: ARTHROPLASTY, HIP, LEFT;  Surgeon: Constantin Murray MD;  Location: [facility];  Service: Orthopedics;  Laterality: Left;  Dago    INSERTION OF VENOUS ACCESS PORT  2007    TUBAL LIGATION         Current Outpatient Medications   Medication Sig    adalimumab (HUMIRA) 10 mg/0.2 mL SyKt Inject 40 mg into the skin every 14 (fourteen) days.    aspirin (ECOTRIN) 81 MG EC tablet Take 1 tablet (81 mg total) by mouth every 12 (twelve)  hours.    clonazePAM (KLONOPIN) 0.5 MG tablet Take 0.5 mg by mouth 2 (two) times daily. At bedtme    gabapentin (NEURONTIN) 300 MG capsule Take 1 capsule (300 mg total) by mouth 2 (two) times daily.    lisinopriL (PRINIVIL,ZESTRIL) 20 MG tablet Take 20 mg by mouth.    multivitamin (THERAGRAN) per tablet Take 1 tablet by mouth once daily.    ondansetron (ZOFRAN) 4 MG tablet Take 1 tablet (4 mg total) by mouth every 8 (eight) hours as needed for Nausea.    oxyCODONE-acetaminophen (PERCOCET) 5-325 mg per tablet Take 1 tablet by mouth every 8 (eight) hours as needed for Pain.    vitamin D (VITAMIN D3) 1000 units Tab cholecalciferol (vitamin D3) 25 mcg (1,000 unit) tablet   Take 1 tablet every day by oral route in the morning for 30 days.     No current facility-administered medications for this visit.       Review of patient's allergies indicates:   Allergen Reactions    Morphine Hives     No benefit      Azathioprine Rash     Pancreatitis      Codeine Rash       Family History   Problem Relation Name Age of Onset    Breast cancer Maternal Aunt      Breast cancer Maternal Grandmother         Social History[1]    History of present illness:   60-year-old female, returns to clinic today status post left total hip arthroplasty May 28th.  ( 5 weeks ago) She was having some pain yesterday in the left leg during physical therapy she was sent to the emergency department for evaluation.  She had an ultrasound which was negative for clot.  X-rays negative as well for gross abnormality with the prosthesis.     She also has known severe right hip arthritis, she is in need of a right total hip arthroplasty as well.      Review of Systems:    Constitution: Negative for chills, fever, and sweats.  Negative for unexplained weight loss.    HENT:  Negative for headaches and blurry vision.    Cardiovascular:Negative for chest pain or irregular heart beat. Negative for hypertension.    Respiratory:  Negative for cough and shortness of  breath.    Gastrointestinal: Negative for abdominal pain, heartburn, melena, nausea, and vomitting.    Genitourinary:  Negative bladder incontinence and dysuria.    Musculoskeletal:  See HPI for details.     Neurological: Negative for numbness.    Psychiatric/Behavioral: Negative for depression.  The patient is not nervous/anxious.      Endocrine: Negative for polyuria    Hematologic/Lymphatic: Negative for bleeding problem.  Does not bruise/bleed easily.    Skin: Negative for poor would healing and rash    Objective:      Physical Examination:    Vital Signs:  There were no vitals filed for this visit.    Body mass index is 27.18 kg/m².    This a well-developed, well nourished patient in no acute distress.  They are alert and oriented and cooperative to examination.          Examination of the bilateral hips, subjectively she has some mild discomfort with palpation over the mid thigh consistent with femoral stem pain.  Normal range motion of the left knee.  Groin pain prior to surgery has resolved.  Gentle range of motion of the left hip with no referred groin pain.  Right hip, pain with range motion groin pain limited internal external rotation.    Pertinent New Results:  Narrative & Impression  EXAMINATION:  XR HIP WITH PELVIS WHEN PERFORMED 2 OR 3 VIEWS LEFT     CLINICAL HISTORY:  pain;     FINDINGS:  AP pelvis and two views of left hip compared with 05/28/2025 show no fracture, dislocation, or destructive osseous lesion. Left total hip arthroplasty remains in place without loosening or other complication.  Severe right hip osteoarthrosis unchanged.  Pubic symphysis and sacroiliac joints are maintained.  Soft tissues are unremarkable.     Impression:     No acute left hip abnormality or complicating feature of left total hip arthroplasty.        Electronically signed by:Armani Brian  Date:                                            07/02/2025  Time:                                           12:06        Exam  Ended: 07/02/25 12:00 CDT Last Resulted: 07/02/25 12:06 CDT     Narrative & Impression  EXAMINATION:  US LOWER EXTREMITY VEINS LEFT     CLINICAL HISTORY:  Pain, unspecified     COMPARISON:  None.     FINDINGS:  Real-time, duplex and color Doppler interrogation of the left lower extremity deep venous system is performed.  This demonstrates patency of the common and superficial femoral veins, greater saphenous vein, popliteal vein and major calf veins. There are no findings of deep vein thrombosis.     Impression:     No findings of deep venous thrombosis involving the left lower extremity.        Electronically signed by:Diana Armas  Date:                                            07/02/2025  Time:                                           11:55        Exam Ended: 07/02/25 11:51 CDT Last Resulted: 07/02/25 11:55 CDT     XRAY Report / Interpretation:       Assessment/Plan:        Five weeks status post left total hip arthroplasty, severe right hip OA in need of a right total hip arthroplasty.  I think she may be experiencing just some ups and Downs postoperatively from the left hip surgery.  X-rays look perfect, ultrasound was negative for DVT.  Possibly some femoral stem pain phenomenon.  We will rule out evaluate we will re-evaluate in 6 weeks.  We are also going to schedule her for her right total hip arthroplasty.  Severe groin pain, limited ambulation due to pain.      Patient was consented for surgery. Risks and benefits of the procedure were discussed in great detail to include the expected preoperative, intraoperative and postoperative courses. Complications may include but are not limited to bleeding, infection, damage to nerves, arteries, blood vessels, bones, tendons, ligaments, stiffness, instability, deep vein thrombus (DVT), pulmonary embolus (PE), altered sensation, continued pain, RSD, loss of motion or a need for additional surgery. As well as general anesthetic complications including seizure,  "stroke, heart attack and even death.      The mobility limitation cannot be sufficiently resolved by the use of a cane. Patient's functional mobility deficit can be sufficiently resolved with the use of a (Rolling Walker or Walker). Patient's mobility limitation significantly impairs their ability to participate in one of more activities of daily living. The use of a (RW or Walker) will significantly improve the patient's ability to participate in MRADLS and the patient will use it on regular basis in the home.      Smooth Garrison, Physician Assistant, served in the capacity as a "scribe" for this patient encounter.  A "face-to-face" encounter occurred with Dr. Constantin Murray on this date.  The treatment plan and medical decision-making is outlined above. Patient was seen and examined with a chaperone.       This note was created using Dragon voice recognition software that occasionally misinterpreted phrases or words.             [1]   Social History  Socioeconomic History    Marital status: Single   Tobacco Use    Smoking status: Former     Average packs/day: 0.3 packs/day for 40.0 years (10.0 ttl pk-yrs)     Types: Cigarettes     Start date: 11/1983    Smokeless tobacco: Never   Substance and Sexual Activity    Alcohol use: Not Currently     Alcohol/week: 10.0 standard drinks of alcohol     Types: 10 Glasses of wine per week     Comment: socially    Drug use: Yes     Frequency: 14.0 times per week     Types: Marijuana     Comment: daily    Sexual activity: Yes     Partners: Male     Birth control/protection: Condom     Social Drivers of Health     Food Insecurity: Patient Declined (4/6/2024)    Received from [facility]    Hunger Vital Sign     Worried About Running Out of Food in the Last Year: Patient declined     Ran Out of Food in the Last Year: Patient declined   Transportation Needs: Patient Declined (4/6/2024)    Received from [facility]    PRAPARE - Transportation     Lack of Transportation (Medical): " Patient declined     Lack of Transportation (Non-Medical): Patient declined

## 2025-07-15 ENCOUNTER — HOSPITAL ENCOUNTER (OUTPATIENT)
Dept: RADIOLOGY | Facility: HOSPITAL | Age: 60
Discharge: HOME | End: 2025-07-15
Attending: ORTHOPAEDIC SURGERY
Payer: MEDICAID

## 2025-07-15 ENCOUNTER — OFFICE VISIT (OUTPATIENT)
Dept: ORTHOPEDICS | Facility: CLINIC | Age: 60
End: 2025-07-15
Payer: MEDICAID

## 2025-07-15 VITALS — WEIGHT: 184.06 LBS | BODY MASS INDEX: 27.26 KG/M2 | HEIGHT: 69 IN

## 2025-07-15 DIAGNOSIS — Z96.642 STATUS POST TOTAL HIP REPLACEMENT, LEFT: Primary | ICD-10-CM

## 2025-07-15 PROCEDURE — 99999 PR PBB SHADOW E&M-EST. PATIENT-LVL III: CPT | Mod: PBBFAC,,, | Performed by: ORTHOPAEDIC SURGERY

## 2025-07-15 PROCEDURE — 72170 X-RAY EXAM OF PELVIS: CPT | Mod: 26,,, | Performed by: RADIOLOGY

## 2025-07-15 PROCEDURE — 4010F ACE/ARB THERAPY RXD/TAKEN: CPT | Mod: CPTII,,, | Performed by: ORTHOPAEDIC SURGERY

## 2025-07-15 PROCEDURE — 99213 OFFICE O/P EST LOW 20 MIN: CPT | Mod: PBBFAC,25,PN | Performed by: ORTHOPAEDIC SURGERY

## 2025-07-15 PROCEDURE — 1160F RVW MEDS BY RX/DR IN RCRD: CPT | Mod: CPTII,,, | Performed by: ORTHOPAEDIC SURGERY

## 2025-07-15 PROCEDURE — 72170 X-RAY EXAM OF PELVIS: CPT | Mod: TC,PN

## 2025-07-15 PROCEDURE — 1159F MED LIST DOCD IN RCRD: CPT | Mod: CPTII,,, | Performed by: ORTHOPAEDIC SURGERY

## 2025-07-15 PROCEDURE — 99024 POSTOP FOLLOW-UP VISIT: CPT | Mod: ,,, | Performed by: ORTHOPAEDIC SURGERY

## 2025-07-15 RX ORDER — TRAMADOL HYDROCHLORIDE 50 MG/1
50 TABLET, FILM COATED ORAL EVERY 8 HOURS PRN
COMMUNITY
Start: 2025-06-11

## 2025-07-15 RX ORDER — ADALIMUMAB 40MG/0.4ML
40 KIT SUBCUTANEOUS
COMMUNITY
Start: 2025-07-12

## 2025-07-15 RX ORDER — OXYCODONE AND ACETAMINOPHEN 5; 325 MG/1; MG/1
1 TABLET ORAL EVERY 8 HOURS PRN
Qty: 21 TABLET | Refills: 0 | Status: SHIPPED | OUTPATIENT
Start: 2025-07-15

## 2025-07-15 NOTE — PROGRESS NOTES
[facility]    Subjective:     Chief Complaint:   Chief Complaint   Patient presents with    Left Hip - Post-op Evaluation     Follow up for PO Left YU 5/28/25, states pain comes and goes. Has sharp stabbing pain every time she takes a step down. She has been going to PT and that did seem to help        Past Medical History:   Diagnosis Date    Achalasia     Anxiety U    Asthma     Crohn's disease     Depression U    History of drug abuse     Menopause     Nausea & vomiting U    Peptic ulcer disease U    Scoliosis U       Past Surgical History:   Procedure Laterality Date    ABDOMINAL SURGERY      removal of colon and intestines    APPENDECTOMY      BACK SURGERY      gely    COLECTOMY      COLECTOMY  2007    COLONOSCOPY  2014    COLONOSCOPY  10/30/2017    COLONOSCOPY N/A 10/21/2019    Procedure: COLONOSCOPY;  Surgeon: Felicita Miller MD;  Location: [facility];  Service: Endoscopy;  Laterality: N/A;    ESOPHAGEAL DILATION N/A 08/05/2019    Procedure: DILATION, ESOPHAGUS;  Surgeon: Felicita Miller MD;  Location: [facility];  Service: Endoscopy;  Laterality: N/A;    ESOPHAGOGASTRODUODENOSCOPY  05/12/2014    ESOPHAGOGASTRODUODENOSCOPY N/A 08/05/2019    Procedure: EGD (ESOPHAGOGASTRODUODENOSCOPY);  Surgeon: Felicita Miller MD;  Location: [facility];  Service: Endoscopy;  Laterality: N/A;    ESOPHAGOGASTRODUODENOSCOPY (EGD) WITH DILATION  10/19/2015    HIP ARTHROPLASTY Left 5/28/2025    Procedure: ARTHROPLASTY, HIP, LEFT;  Surgeon: Constantni Murray MD;  Location: [facility] OR;  Service: Orthopedics;  Laterality: Left;  Dago    INSERTION OF VENOUS ACCESS PORT  2007    TUBAL LIGATION         Current Outpatient Medications   Medication Sig    adalimumab (HUMIRA) 10 mg/0.2 mL SyKt Inject 40 mg into the skin every 14 (fourteen) days.    aspirin (ECOTRIN) 81 MG EC tablet Take 1 tablet (81 mg total) by mouth every 12 (twelve) hours.    clonazePAM (KLONOPIN) 0.5 MG tablet Take 0.5 mg by mouth 2 (two) times daily. At  bedtme    HUMIRA,CF, PEN 40 mg/0.4 mL PnKt Inject 40 mg into the skin.    lisinopriL (PRINIVIL,ZESTRIL) 20 MG tablet Take 20 mg by mouth.    multivitamin (THERAGRAN) per tablet Take 1 tablet by mouth once daily.    promethazine HCl (PROMETHAZINE ORAL) Take by mouth.    traMADoL (ULTRAM) 50 mg tablet Take 50 mg by mouth.    vitamin D (VITAMIN D3) 1000 units Tab cholecalciferol (vitamin D3) 25 mcg (1,000 unit) tablet   Take 1 tablet every day by oral route in the morning for 30 days.    gabapentin (NEURONTIN) 300 MG capsule Take 1 capsule (300 mg total) by mouth 2 (two) times daily. (Patient not taking: Reported on 7/15/2025)    ondansetron (ZOFRAN) 4 MG tablet Take 1 tablet (4 mg total) by mouth every 8 (eight) hours as needed for Nausea. (Patient not taking: Reported on 7/15/2025)     No current facility-administered medications for this visit.       Review of patient's allergies indicates:   Allergen Reactions    Morphine Hives     No benefit      Azathioprine Rash     Pancreatitis      Codeine Rash       Family History   Problem Relation Name Age of Onset    Breast cancer Maternal Aunt      Breast cancer Maternal Grandmother         Social History[1]    History of present illness: 60-year-old female, returns to clinic today status post left total hip arthroplasty May 28th. She was having some pain 2 weeks ago in the left leg during physical therapy she was sent to the emergency department for evaluation.  She had an ultrasound which was negative for clot.  X-rays negative as well for gross abnormality with the prosthesis. She followed up with us, positive reassurance was provided.  She returns today for recheck of symptoms.      She also has known severe right hip arthritis, she is in need of a right total hip arthroplasty as well , August 11th scheduled for surgery.      Review of Systems:    Constitution: Negative for chills, fever, and sweats.  Negative for unexplained weight loss.    HENT:  Negative for  headaches and blurry vision.    Cardiovascular:Negative for chest pain or irregular heart beat. Negative for hypertension.    Respiratory:  Negative for cough and shortness of breath.    Gastrointestinal: Negative for abdominal pain, heartburn, melena, nausea, and vomitting.    Genitourinary:  Negative bladder incontinence and dysuria.    Musculoskeletal:  See HPI for details.     Neurological: Negative for numbness.    Psychiatric/Behavioral: Negative for depression.  The patient is not nervous/anxious.      Endocrine: Negative for polyuria    Hematologic/Lymphatic: Negative for bleeding problem.  Does not bruise/bleed easily.    Skin: Negative for poor would healing and rash    Objective:      Physical Examination:    Vital Signs:  There were no vitals filed for this visit.    Body mass index is 27.18 kg/m².    This a well-developed, well nourished patient in no acute distress.  They are alert and oriented and cooperative to examination.            Examination of the bilateral hips, subjectively she  continues to have some mild discomfort with palpation over the mid thigh.  Normal range motion of the left knee.  Groin pain prior to surgery has resolved.  Gentle range of motion of the left hip with no referred groin pain.  Right hip, pain with range motion groin pain limited internal external rotation.     Pertinent New Results:    XRAY Report / Interpretation:    AP pelvis taken today in the office demonstrate left total hip arthroplasty in appropriate position without evidence of hardware failure or loosening or subsidence.  The tip of the prosthesis is not visualized.    Assessment/Plan:        Stable status post left total hip arthroplasty, scheduled for right total hip arthroplasty August 11th.  She continues to progress normal postoperative course after left total hip arthroplasty.  We refilled her pain medication today.  She will follow up with us for surgery and after surgery of the right hip next  "month.      Smooth Garrison, Physician Assistant, served in the capacity as a "scribe" for this patient encounter.  A "face-to-face" encounter occurred with Dr. Constantin Murray on this date.  The treatment plan and medical decision-making is outlined above. Patient was seen and examined with a chaperone.     This note was created using Dragon voice recognition software that occasionally misinterpreted phrases or words.             [1]   Social History  Socioeconomic History    Marital status: Single   Tobacco Use    Smoking status: Former     Average packs/day: 0.3 packs/day for 40.0 years (10.0 ttl pk-yrs)     Types: Cigarettes     Start date: 11/1983    Smokeless tobacco: Never   Substance and Sexual Activity    Alcohol use: Not Currently     Alcohol/week: 10.0 standard drinks of alcohol     Types: 10 Glasses of wine per week     Comment: socially    Drug use: Yes     Frequency: 14.0 times per week     Types: Marijuana     Comment: daily    Sexual activity: Yes     Partners: Male     Birth control/protection: Condom     Social Drivers of Health     Food Insecurity: Patient Declined (4/6/2024)    Received from [facility]    Hunger Vital Sign     Worried About Running Out of Food in the Last Year: Patient declined     Ran Out of Food in the Last Year: Patient declined   Transportation Needs: Patient Declined (4/6/2024)    Received from [facility]    PRAPARE - Transportation     Lack of Transportation (Medical): Patient declined     Lack of Transportation (Non-Medical): Patient declined     "

## 2025-07-16 ENCOUNTER — HOSPITAL ENCOUNTER (OUTPATIENT)
Facility: HOSPITAL | Age: 60
Discharge: HOME | End: 2025-07-18
Attending: STUDENT IN AN ORGANIZED HEALTH CARE EDUCATION/TRAINING PROGRAM | Admitting: INTERNAL MEDICINE
Payer: MEDICAID

## 2025-07-16 DIAGNOSIS — R91.8 OPACITY OF LUNG ON IMAGING STUDY: ICD-10-CM

## 2025-07-16 DIAGNOSIS — R07.9 CHEST PAIN: ICD-10-CM

## 2025-07-16 DIAGNOSIS — I31.39 PERICARDIAL EFFUSION: ICD-10-CM

## 2025-07-16 DIAGNOSIS — Z95.828 ACQUIRED PORTAL-SYSTEMIC SHUNT: Primary | ICD-10-CM

## 2025-07-16 PROBLEM — R09.1 PLEURISY: Status: ACTIVE | Noted: 2025-07-16

## 2025-07-16 PROBLEM — Z96.649 HISTORY OF TOTAL HIP ARTHROPLASTY: Status: ACTIVE | Noted: 2025-07-16

## 2025-07-16 PROBLEM — I10 PRIMARY HYPERTENSION: Status: ACTIVE | Noted: 2025-07-16

## 2025-07-16 LAB
ABSOLUTE EOSINOPHIL (SMH): 0.12 K/UL
ABSOLUTE MONOCYTE (SMH): 0.47 K/UL (ref 0.3–1)
ABSOLUTE NEUTROPHIL COUNT (SMH): 1.8 K/UL (ref 1.8–7.7)
ALBUMIN SERPL-MCNC: 4.5 G/DL (ref 3.5–5.2)
ALP SERPL-CCNC: 93 UNIT/L (ref 55–135)
ALT SERPL-CCNC: 13 UNIT/L (ref 10–44)
ANION GAP (SMH): 9 MMOL/L (ref 8–16)
AST SERPL-CCNC: 21 UNIT/L (ref 10–40)
BASOPHILS # BLD AUTO: 0.02 K/UL
BASOPHILS NFR BLD AUTO: 0.4 %
BILIRUB SERPL-MCNC: 0.3 MG/DL (ref 0.1–1)
BNP SERPL-MCNC: 12 PG/ML
BUN SERPL-MCNC: 23 MG/DL (ref 8–23)
CALCIUM SERPL-MCNC: 9.3 MG/DL (ref 8.7–10.5)
CHLORIDE SERPL-SCNC: 103 MMOL/L (ref 95–110)
CO2 SERPL-SCNC: 26 MMOL/L (ref 23–29)
CREAT SERPL-MCNC: 0.9 MG/DL (ref 0.5–1.4)
D DIMER PPP IA.FEU-MCNC: 1.35 MG/L FEU
ERYTHROCYTE [DISTWIDTH] IN BLOOD BY AUTOMATED COUNT: 13.3 % (ref 11.5–14.5)
GFR SERPLBLD CREATININE-BSD FMLA CKD-EPI: >60 ML/MIN/1.73/M2
GLUCOSE SERPL-MCNC: 88 MG/DL (ref 70–110)
HCT VFR BLD AUTO: 40.1 % (ref 37–48.5)
HCV AB SERPL QL IA: NORMAL
HGB BLD-MCNC: 12.9 GM/DL (ref 12–16)
HIV 1+2 AB+HIV1 P24 AG SERPL QL IA: NORMAL
IMM GRANULOCYTES # BLD AUTO: 0.01 K/UL (ref 0–0.04)
IMM GRANULOCYTES NFR BLD AUTO: 0.2 % (ref 0–0.5)
LYMPHOCYTES # BLD AUTO: 2.98 K/UL (ref 1–4.8)
MCH RBC QN AUTO: 32.7 PG (ref 27–31)
MCHC RBC AUTO-ENTMCNC: 32.2 G/DL (ref 32–36)
MCV RBC AUTO: 102 FL (ref 82–98)
NUCLEATED RBC (/100WBC) (SMH): 0 /100 WBC
PLATELET # BLD AUTO: 240 K/UL (ref 150–450)
PMV BLD AUTO: 10.9 FL (ref 9.2–12.9)
POTASSIUM SERPL-SCNC: 4.1 MMOL/L (ref 3.5–5.1)
PROT SERPL-MCNC: 7.9 GM/DL (ref 6–8.4)
RBC # BLD AUTO: 3.95 M/UL (ref 4–5.4)
RELATIVE EOSINOPHIL (SMH): 2.2 % (ref 0–8)
RELATIVE LYMPHOCYTE (SMH): 55.3 % (ref 18–48)
RELATIVE MONOCYTE (SMH): 8.7 % (ref 4–15)
RELATIVE NEUTROPHIL (SMH): 33.2 % (ref 38–73)
SODIUM SERPL-SCNC: 138 MMOL/L (ref 136–145)
TROPONIN HIGH SENSITIVE (SMH): 2.5 PG/ML
WBC # BLD AUTO: 5.39 K/UL (ref 3.9–12.7)

## 2025-07-16 PROCEDURE — 84484 ASSAY OF TROPONIN QUANT: CPT | Performed by: NURSE PRACTITIONER

## 2025-07-16 PROCEDURE — G0378 HOSPITAL OBSERVATION PER HR: HCPCS

## 2025-07-16 PROCEDURE — 83880 ASSAY OF NATRIURETIC PEPTIDE: CPT | Performed by: NURSE PRACTITIONER

## 2025-07-16 PROCEDURE — 25000003 PHARM REV CODE 250: Performed by: STUDENT IN AN ORGANIZED HEALTH CARE EDUCATION/TRAINING PROGRAM

## 2025-07-16 PROCEDURE — 96374 THER/PROPH/DIAG INJ IV PUSH: CPT

## 2025-07-16 PROCEDURE — 63600175 PHARM REV CODE 636 W HCPCS: Performed by: STUDENT IN AN ORGANIZED HEALTH CARE EDUCATION/TRAINING PROGRAM

## 2025-07-16 PROCEDURE — 25500020 PHARM REV CODE 255: Performed by: STUDENT IN AN ORGANIZED HEALTH CARE EDUCATION/TRAINING PROGRAM

## 2025-07-16 PROCEDURE — 63600175 PHARM REV CODE 636 W HCPCS: Mod: TB,JZ

## 2025-07-16 PROCEDURE — 86803 HEPATITIS C AB TEST: CPT | Performed by: EMERGENCY MEDICINE

## 2025-07-16 PROCEDURE — 93005 ELECTROCARDIOGRAM TRACING: CPT | Performed by: INTERNAL MEDICINE

## 2025-07-16 PROCEDURE — 80053 COMPREHEN METABOLIC PANEL: CPT | Performed by: NURSE PRACTITIONER

## 2025-07-16 PROCEDURE — 96375 TX/PRO/DX INJ NEW DRUG ADDON: CPT

## 2025-07-16 PROCEDURE — 85025 COMPLETE CBC W/AUTO DIFF WBC: CPT | Performed by: NURSE PRACTITIONER

## 2025-07-16 PROCEDURE — 93010 ELECTROCARDIOGRAM REPORT: CPT | Mod: ,,, | Performed by: INTERNAL MEDICINE

## 2025-07-16 PROCEDURE — 87389 HIV-1 AG W/HIV-1&-2 AB AG IA: CPT | Performed by: EMERGENCY MEDICINE

## 2025-07-16 PROCEDURE — 99285 EMERGENCY DEPT VISIT HI MDM: CPT | Mod: 25

## 2025-07-16 PROCEDURE — 85379 FIBRIN DEGRADATION QUANT: CPT | Performed by: NURSE PRACTITIONER

## 2025-07-16 RX ORDER — TALC
6 POWDER (GRAM) TOPICAL NIGHTLY PRN
Status: DISCONTINUED | OUTPATIENT
Start: 2025-07-17 | End: 2025-07-18 | Stop reason: HOSPADM

## 2025-07-16 RX ORDER — IBUPROFEN 200 MG
16 TABLET ORAL
Status: DISCONTINUED | OUTPATIENT
Start: 2025-07-17 | End: 2025-07-18 | Stop reason: HOSPADM

## 2025-07-16 RX ORDER — ACETAMINOPHEN 500 MG
1000 TABLET ORAL EVERY 6 HOURS PRN
Status: DISCONTINUED | OUTPATIENT
Start: 2025-07-17 | End: 2025-07-18 | Stop reason: HOSPADM

## 2025-07-16 RX ORDER — DIPHENHYDRAMINE HCL 25 MG
25 CAPSULE ORAL
Status: COMPLETED | OUTPATIENT
Start: 2025-07-16 | End: 2025-07-16

## 2025-07-16 RX ORDER — AMOXICILLIN 250 MG
1 CAPSULE ORAL DAILY
Status: DISCONTINUED | OUTPATIENT
Start: 2025-07-17 | End: 2025-07-18 | Stop reason: HOSPADM

## 2025-07-16 RX ORDER — NALOXONE HCL 0.4 MG/ML
0.02 VIAL (ML) INJECTION
Status: DISCONTINUED | OUTPATIENT
Start: 2025-07-17 | End: 2025-07-18 | Stop reason: HOSPADM

## 2025-07-16 RX ORDER — ENOXAPARIN SODIUM 100 MG/ML
40 INJECTION SUBCUTANEOUS EVERY 24 HOURS
Status: DISCONTINUED | OUTPATIENT
Start: 2025-07-17 | End: 2025-07-18 | Stop reason: HOSPADM

## 2025-07-16 RX ORDER — LIDOCAINE 50 MG/G
2 PATCH TOPICAL ONCE
Status: COMPLETED | OUTPATIENT
Start: 2025-07-16 | End: 2025-07-17

## 2025-07-16 RX ORDER — GLUCAGON 1 MG
1 KIT INJECTION
Status: DISCONTINUED | OUTPATIENT
Start: 2025-07-17 | End: 2025-07-18 | Stop reason: HOSPADM

## 2025-07-16 RX ORDER — LISINOPRIL 20 MG/1
20 TABLET ORAL DAILY
Status: DISCONTINUED | OUTPATIENT
Start: 2025-07-17 | End: 2025-07-18 | Stop reason: HOSPADM

## 2025-07-16 RX ORDER — SODIUM,POTASSIUM PHOSPHATES 280-250MG
2 POWDER IN PACKET (EA) ORAL
Status: DISCONTINUED | OUTPATIENT
Start: 2025-07-17 | End: 2025-07-18 | Stop reason: HOSPADM

## 2025-07-16 RX ORDER — LANOLIN ALCOHOL/MO/W.PET/CERES
800 CREAM (GRAM) TOPICAL
Status: DISCONTINUED | OUTPATIENT
Start: 2025-07-17 | End: 2025-07-18 | Stop reason: HOSPADM

## 2025-07-16 RX ORDER — ASPIRIN 325 MG
325 TABLET ORAL
Status: COMPLETED | OUTPATIENT
Start: 2025-07-16 | End: 2025-07-16

## 2025-07-16 RX ORDER — ONDANSETRON HYDROCHLORIDE 2 MG/ML
4 INJECTION, SOLUTION INTRAVENOUS EVERY 6 HOURS PRN
Status: DISCONTINUED | OUTPATIENT
Start: 2025-07-17 | End: 2025-07-18 | Stop reason: HOSPADM

## 2025-07-16 RX ORDER — KETOROLAC TROMETHAMINE 30 MG/ML
15 INJECTION, SOLUTION INTRAMUSCULAR; INTRAVENOUS EVERY 6 HOURS PRN
Status: DISCONTINUED | OUTPATIENT
Start: 2025-07-17 | End: 2025-07-18 | Stop reason: HOSPADM

## 2025-07-16 RX ORDER — ALUMINUM HYDROXIDE, MAGNESIUM HYDROXIDE, AND SIMETHICONE 1200; 120; 1200 MG/30ML; MG/30ML; MG/30ML
30 SUSPENSION ORAL 4 TIMES DAILY PRN
Status: DISCONTINUED | OUTPATIENT
Start: 2025-07-17 | End: 2025-07-18 | Stop reason: HOSPADM

## 2025-07-16 RX ORDER — IBUPROFEN 200 MG
24 TABLET ORAL
Status: DISCONTINUED | OUTPATIENT
Start: 2025-07-17 | End: 2025-07-18 | Stop reason: HOSPADM

## 2025-07-16 RX ORDER — ASPIRIN 81 MG/1
81 TABLET ORAL EVERY 12 HOURS
Status: DISCONTINUED | OUTPATIENT
Start: 2025-07-17 | End: 2025-07-18 | Stop reason: HOSPADM

## 2025-07-16 RX ORDER — CLONAZEPAM 0.5 MG/1
0.5 TABLET ORAL 2 TIMES DAILY
Status: DISCONTINUED | OUTPATIENT
Start: 2025-07-17 | End: 2025-07-18 | Stop reason: HOSPADM

## 2025-07-16 RX ORDER — SODIUM CHLORIDE 0.9 % (FLUSH) 0.9 %
10 SYRINGE (ML) INJECTION EVERY 12 HOURS PRN
Status: DISCONTINUED | OUTPATIENT
Start: 2025-07-17 | End: 2025-07-18 | Stop reason: HOSPADM

## 2025-07-16 RX ORDER — KETOROLAC TROMETHAMINE 30 MG/ML
30 INJECTION, SOLUTION INTRAMUSCULAR; INTRAVENOUS EVERY 6 HOURS PRN
Status: DISCONTINUED | OUTPATIENT
Start: 2025-07-17 | End: 2025-07-18 | Stop reason: HOSPADM

## 2025-07-16 RX ORDER — DROPERIDOL 2.5 MG/ML
0.62 INJECTION, SOLUTION INTRAMUSCULAR; INTRAVENOUS ONCE
Status: COMPLETED | OUTPATIENT
Start: 2025-07-17 | End: 2025-07-16

## 2025-07-16 RX ADMIN — KETOROLAC TROMETHAMINE 30 MG: 30 INJECTION, SOLUTION INTRAMUSCULAR; INTRAVENOUS at 11:07

## 2025-07-16 RX ADMIN — IOHEXOL 100 ML: 350 INJECTION, SOLUTION INTRAVENOUS at 07:07

## 2025-07-16 RX ADMIN — DROPERIDOL 0.62 MG: 2.5 INJECTION, SOLUTION INTRAMUSCULAR; INTRAVENOUS at 11:07

## 2025-07-16 RX ADMIN — LIDOCAINE 2 PATCH: 50 PATCH TOPICAL at 07:07

## 2025-07-16 RX ADMIN — DIPHENHYDRAMINE HYDROCHLORIDE 25 MG: 25 CAPSULE ORAL at 11:07

## 2025-07-16 RX ADMIN — ASPIRIN 325 MG ORAL TABLET 325 MG: 325 PILL ORAL at 08:07

## 2025-07-16 NOTE — FIRST PROVIDER EVALUATION
Emergency Department TeleTriage Encounter Note      CHIEF COMPLAINT    Chief Complaint   Patient presents with    Chest Pain     Pt c/o rib pain on left side. Hx of pna.        VITAL SIGNS   Initial Vitals [07/16/25 1304]   BP Pulse Resp Temp SpO2   (!) 145/84 74 18 98.5 °F (36.9 °C) --      MAP       --            ALLERGIES    Review of patient's allergies indicates:   Allergen Reactions    Morphine Hives     No benefit      Azathioprine Rash     Pancreatitis      Codeine Rash       PROVIDER TRIAGE NOTE  Left sided chest pain with inspiration that began last night. Pain is in the back at rest as well. SOB with exertion. Had a hip replacement May 28, 2025. No recent immobilization/bed rest.     Limited physical exam via telehealth: The patient is awake, alert, answering questions appropriately and is not in respiratory distress.  As the Teletriage provider, I performed an initial assessment and ordered appropriate labs and imaging studies, if any, to facilitate the patient's care once placed in the ED. Once a room is available, care and a full evaluation will be completed by an alternate ED provider.  Any additional orders and the final disposition will be determined by that provider.  All imaging and labs will not be followed-up by the Teletriage Team, including myself.          ORDERS  Labs Reviewed   HEPATITIS C ANTIBODY   HEP C VIRUS HOLD SPECIMEN   HIV 1 / 2 ANTIBODY   HIV VIRUS CONFIRMATION HOLD SPECIMEN       ED Orders (720h ago, onward)      Start Ordered     Status Ordering Provider    07/16/25 1531 07/16/25 1331  Troponin I High Sensitivity #2  Once Timed         Ordered DYANA BROOKE    07/16/25 1331 07/16/25 1331  Vital signs  Every 15 min         Ordered DYANA BROOKE    07/16/25 1331 07/16/25 1331  Cardiac Monitoring - Adult  Continuous        Comments: Notify Physician If:    Ordered DYANA BROOKE    07/16/25 1331 07/16/25 1331  Pulse Oximetry Continuous  Continuous         Ordered  "GABYDADYANA HALL.    07/16/25 1331 07/16/25 1331  Diet NPO  Diet effective now         Ordered GABYDARDYANA A.    07/16/25 1331 07/16/25 1331  EKG 12-lead  Once        Comments: Do not perform if previously done during this visit/ triage    Ordered CECILIARDYANA A.    07/16/25 1331 07/16/25 1331  Troponin I High Sensitivity #1  STAT         Ordered DYANA BROOKE A.    07/16/25 1331 07/16/25 1331  B-Type natriuretic peptide (BNP)  STAT         Ordered GABYDARDYANA A.    07/16/25 1330 07/16/25 1329  X-Ray Chest PA And Lateral  1 time imaging         Ordered GABYDADYANA HALL.    07/16/25 1330 07/16/25 1329  Saline lock IV  Once         Ordered GABYDARDYANA A.    07/16/25 1330 07/16/25 1329  CBC auto differential  STAT         Ordered DYANA BROOKE A.    07/16/25 1330 07/16/25 1329  Comprehensive metabolic panel  STAT         Ordered GABYDADYANA HALL A.    07/16/25 1330 07/16/25 1329  CBC with Differential  PROCEDURE ONCE         Ordered DYANA BROOKE.    07/16/25 1307 07/16/25 1306  Hepatitis C Antibody  STAT        Placed in "And" Linked Group    Ordered ANTHONY HAY    07/16/25 1307 07/16/25 1306  HCV Virus Hold Specimen  STAT        Placed in "And" Linked Group    Ordered ANTHONY HAY    07/16/25 1307 07/16/25 1306  HIV 1/2 Ag/Ab (4th Gen)  STAT        Placed in "And" Linked Group    Ordered ANTHONY HAY    07/16/25 1307 07/16/25 1306  HIV Virus Confirmation Hold Specimen  STAT        Placed in "And" Linked Group    Ordered ANTHONY HAY              Virtual Visit Note: The provider triage portion of this emergency department evaluation and documentation was performed via [employer], a HIPAA-compliant telemedicine application, in concert with a tele-presenter in the room. A face to face patient evaluation with one of my colleagues will occur once the patient is placed in an emergency department room.      DISCLAIMER: This note was prepared with [employer] voice recognition transcription software. " Garbled syntax, mangled pronouns, and other bizarre constructions may be attributed to that software system.

## 2025-07-16 NOTE — ED PROVIDER NOTES
Encounter Date: 7/16/2025       History     Chief Complaint   Patient presents with    Chest Pain     Pt c/o rib pain on left side. Hx of pna.      HPI  60-year-old presenting with left-sided chest pain since last night.  Worse with deep breath.  Radiating to the back.  Had hip replacement May 20, 2025.  History of Crohn's.  Patient says she would not describe the sensation as a pain but she said that it hurts quite a bit.  She said she does not want to call it a chest pain because she is worried about her lung and therefore wants to call it a lung pain.  We discussed that we need to keep the differential broad so we do not miss other possibilities that may be causing her pain but we will also evaluate her lung.  She is concerned about her lung because she said she gets pneumonia once per year and she has needed her left lung drained in the past and this is what it feels like.      She reports she was feeling totally fine yesterday and then woke up with this discomfort to her left lower chest area wrapping around under her left breast into the left mid back.  She reports that she does not think she did any exertional activities that could have pulled a muscle here.      When she is sitting still she still has a soreness in this area but when she takes a deep breath or when she walks it becomes worse.  I discussed with her that she had told the tele triage nurse practitioner that she had shortness breath with exertion.  She said she denies this but then also says that she does have shortness of breath with exertion chronically and thinks this is because she is old she does not think that has worse recently.      She reports that she used to smoke and used recreational drugs but says that she has not used any recreational drugs (cocaine no opioids) accept marijuana for many years.     When asked where her pain is she is pointing to the left chest under the breast and as well as the left upper quadrant.  She denies  abdominal pain but reports that this is where her pain is.  Again she says she does not want to see abdomen or chest because she is more worried about her lung.    No fevers, chills, sore throat, runny nose, nausea, vomiting, diaphoresis, lightheadedness, dizziness, sensation changes to the face or extremities, vision changes, dysuria, urinary frequency, vaginal discharge, pelvic pain, rash, swelling.     No history of DVT or PE, no leg pain or leg swelling or skin changes of the legs, or hemoptysis or malignancy with treatment within 6 months or palliative.  Had left hip surgery at the end of may.     Reports that her discomfort is moderate but she declines any symptomatic treatment  Review of patient's allergies indicates:   Allergen Reactions    Morphine Hives     No benefit      Azathioprine Rash     Pancreatitis      Codeine Rash     Past Medical History:   Diagnosis Date    Achalasia     Anxiety U    Asthma     Crohn's disease     Depression U    History of drug abuse     Menopause     Nausea & vomiting U    Peptic ulcer disease U    Scoliosis U     Past Surgical History:   Procedure Laterality Date    ABDOMINAL SURGERY      removal of colon and intestines    APPENDECTOMY      BACK SURGERY      gely    COLECTOMY      COLECTOMY  2007    COLONOSCOPY  2014    COLONOSCOPY  10/30/2017    COLONOSCOPY N/A 10/21/2019    Procedure: COLONOSCOPY;  Surgeon: Felicita Miller MD;  Location: [facility];  Service: Endoscopy;  Laterality: N/A;    ESOPHAGEAL DILATION N/A 08/05/2019    Procedure: DILATION, ESOPHAGUS;  Surgeon: Felicita Miller MD;  Location: [facility];  Service: Endoscopy;  Laterality: N/A;    ESOPHAGOGASTRODUODENOSCOPY  05/12/2014    ESOPHAGOGASTRODUODENOSCOPY N/A 08/05/2019    Procedure: EGD (ESOPHAGOGASTRODUODENOSCOPY);  Surgeon: Felicita Miller MD;  Location: [facility];  Service: Endoscopy;  Laterality: N/A;    ESOPHAGOGASTRODUODENOSCOPY (EGD) WITH DILATION  10/19/2015    HIP ARTHROPLASTY Left 5/28/2025     Procedure: ARTHROPLASTY, HIP, LEFT;  Surgeon: Constantin Murray MD;  Location: [facility];  Service: Orthopedics;  Laterality: Left;  Dago    INSERTION OF VENOUS ACCESS PORT  2007    TUBAL LIGATION       Family History   Problem Relation Name Age of Onset    Breast cancer Maternal Aunt      Breast cancer Maternal Grandmother       Social History[1]  Review of Systems  See hpi   Physical Exam     Initial Vitals   BP Pulse Resp Temp SpO2   07/16/25 1304 07/16/25 1304 07/16/25 1304 07/16/25 1304 07/16/25 2032   (!) 145/84 74 18 98.5 °F (36.9 °C) 99 %      MAP       --                Physical Exam    Nursing note and vitals reviewed.  Constitutional: She appears well-developed. She is not diaphoretic.   HENT:   Head: Normocephalic.   Eyes: Right eye exhibits no discharge. Left eye exhibits no discharge. No scleral icterus.   Neck: Neck supple. No tracheal deviation present.   Cardiovascular:  Normal rate and regular rhythm.           Pulmonary/Chest: Breath sounds normal. No stridor. No respiratory distress. She has no wheezes. She has no rhonchi. She has no rales. She exhibits tenderness (under L breast).   Abdominal: Abdomen is soft. She exhibits no distension. There is abdominal tenderness (LUQ mild). There is no rebound and no guarding.   Musculoskeletal:         General: Tenderness (mild L lower chest, L lateral mid back. no overlying skin changes. no shingles.) present. No edema (extremities).      Cervical back: Neck supple.     Neurological: She is alert and oriented to person, place, and time.   Skin: Skin is warm and dry.         ED Course   Procedures  Labs Reviewed   CBC WITH DIFFERENTIAL - Abnormal       Result Value    WBC 5.39      RBC 3.95 (*)     Hgb 12.9      Hct 40.1       (*)     MCH 32.7 (*)     MCHC 32.2      RDW 13.3      Platelet Count 240      MPV 10.9      Nucleated RBC 0      Neut % 33.2 (*)     Lymph % 55.3 (*)     Mono % 8.7      Eos % 2.2      Basophil % 0.4      Imm Grans % 0.2      Neut  # 1.8      Lymph # 2.98      Mono # 0.47      Eos # 0.12      Baso # 0.02      Imm Grans # 0.01     D DIMER, QUANTITATIVE - Abnormal    D-Dimer 1.35 (*)    COMPREHENSIVE METABOLIC PANEL - Abnormal    Sodium 139      Potassium 4.0      Chloride 104      CO2 30 (*)     Glucose 98      BUN 19      Creatinine 0.7      Calcium 9.0      Protein Total 7.1      Albumin 4.0      Bilirubin Total 0.5      ALP 90      AST 16      ALT 11      Anion Gap 5 (*)     eGFR >60     CBC WITH DIFFERENTIAL - Abnormal    WBC 3.42 (*)     RBC 3.68 (*)     Hgb 11.9 (*)     Hct 36.8 (*)      (*)     MCH 32.3 (*)     MCHC 32.3      RDW 13.2      Platelet Count 232      MPV 11.0      Nucleated RBC 0      Neut % 32.5 (*)     Lymph % 52.0 (*)     Mono % 12.6      Eos % 2.3      Basophil % 0.6      Imm Grans % 0.0      Neut # 1.1 (*)     Lymph # 1.78      Mono # 0.43      Eos # 0.08      Baso # 0.02      Imm Grans # 0.00     HEPATITIS C ANTIBODY - Normal    Hep C Ab Interp Non-Reactive     HIV 1 / 2 ANTIBODY - Normal    HIV 1/2 Ag/Ab Non-Reactive     COMPREHENSIVE METABOLIC PANEL - Normal    Sodium 138      Potassium 4.1      Chloride 103      CO2 26      Glucose 88      BUN 23      Creatinine 0.9      Calcium 9.3      Protein Total 7.9      Albumin 4.5      Bilirubin Total 0.3      ALP 93      AST 21      ALT 13      Anion Gap 9      eGFR >60     TROPONIN I HIGH SENSITIVITY - Normal    Troponin High Sensitive 2.5     TROPONIN I HIGH SENSITIVITY - Normal    Troponin High Sensitive <2.3     B-TYPE NATRIURETIC PEPTIDE - Normal    BNP 12     PROCALCITONIN - Normal    Procalcitonin <0.050     MAGNESIUM - Normal    Magnesium 1.9     CBC W/ AUTO DIFFERENTIAL    Narrative:     The following orders were created for panel order CBC auto differential.  Procedure                               Abnormality         Status                     ---------                               -----------         ------                     CBC with  Differential[[id number]]       Abnormal            Final result                 Please view results for these tests on the individual orders.   CBC W/ AUTO DIFFERENTIAL    Narrative:     The following orders were created for panel order CBC with Automated Differential.  Procedure                               Abnormality         Status                     ---------                               -----------         ------                     CBC with Differential[[id number]]       Abnormal            Final result                 Please view results for these tests on the individual orders.   HEP C VIRUS HOLD SPECIMEN   HIV VIRUS CONFIRMATION HOLD SPECIMEN        ECG Results              EKG 12-lead (In process)        Collection Time Result Time QRS Duration OHS QTC Calculation    07/16/25 17:20:27 07/17/25 05:47:12 142 440                     In process by Interface, Lab In [facility] [place] (07/17/25 05:47:17)                   Narrative:    Test Reason : R07.9,    Vent. Rate :  54 BPM     Atrial Rate :  54 BPM     P-R Int : 154 ms          QRS Dur : 142 ms      QT Int : 464 ms       P-R-T Axes :  69  61  59 degrees    QTcB Int : 440 ms    Sinus bradycardia with Premature atrial complexes  Possible Left atrial enlargement  Right bundle branch block  Abnormal ECG  When compared with ECG of 21-May-2025 14:37,  Premature atrial complexes are now Present    Referred By: AAAREFERRAL SELF           Confirmed By:                                      EKG 12-lead (Final result)  Result time 07/18/25 11:42:24      Final result by Unknown User (07/18/25 11:42:24)                                      Imaging Results               CT Abdomen Pelvis With IV Contrast NO Oral Contrast (Final result)  Result time 07/16/25 21:27:22      Final result by Amarjit Gayle MD (07/16/25 21:27:22)                   Impression:      There is no large central saddle type pulmonary embolus, sensitivity of the examination is diminished as discussed  above, when accounting for limitations of the exam there is no additional evidence for pulmonary embolus on this exam.    Bilateral lower lobe peribronchial thickening with partial bronchial opacity, more prominent at the right lower lobe with associated bandlike opacity potentially relating to volume loss and atelectasis as discussed above.    Small pericardial effusion.    Mild wall thickening at the level of the ileal colonic anastomosis, without significant surrounding edema or inflammation, however given the patient's history of Crohn's disease correlation for signs or symptoms of acute Crohn's flare up is needed.    Indeterminate splenic lesion, nonemergent ultrasound may be helpful for characterization.    Prominent appearance of the region of the lower uterine segment/cervix for which clinical and historical correlation and evaluation is recommended.    Uterine lesion may represent a uterine fibroid.    Mildly prominent vasculature of the left adnexa, can be seen with pelvic congestion syndrome.    Cholelithiasis, there is no evidence for pericholecystic inflammatory change.    Fluid collection within the subcutaneous fat planes overlying the left hip, may relate to a postoperative fluid collection, may relate to seroma or resolving hematoma, clinical and historical correlation for signs or symptoms of infection is needed.    Additional findings as above.    This report was flagged in Epic as abnormal.      Electronically signed by: Amarjit Gayle  Date:    07/16/2025  Time:    21:27               Narrative:    EXAMINATION:  CTA CHEST NON CORONARY (PE STUDIES); CT ABDOMEN PELVIS WITH IV CONTRAST    CLINICAL HISTORY:  Pulmonary embolism (PE) suspected, positive D-dimer;; LUQ pain;    TECHNIQUE:  CT examination of the chest abdomen and pelvis was performed following the IV administration of 100 mL of Omnipaque 350.  Contrast timing was optimized to evaluate the pulmonary arteries.  Axial CT examination of the  chest is submitted, sagittal and coronal MIP images of the chest were performed.  Single phase postcontrast axial CT examination of the abdomen and pelvis is submitted, sagittal and coronal reconstruction imaging of the abdomen and pelvis is submitted.  Oral contrast was not utilized.    COMPARISON:  Chest radiograph July 16, 2025    FINDINGS:  The degree of pulmonary arterial opacification is mildly less than optimal and there is artifact present, producing heterogeneity of the pulmonary arterial vasculature, there is also mild motion artifact, this diminishes the sensitivity of the exam, most notably with respect to the midsize and smaller pulmonary arteries, when accounting for the aforementioned an abnormal pattern of pulmonary arterial filling defects specific for pulmonary emboli is not seen.  There is no large central saddle type pulmonary embolus.    Subcutaneous port central venous catheter noted, the distal tip extends to the level of the SVC.  The thoracic aorta demonstrates appropriate opacification.  Atherosclerotic change noted.  Small mediastinal lymph nodes are noted, nonspecific appearance.  Coronary arterial atherosclerotic change noted.  There is a small pericardial effusion noted.    The lungs demonstrate motion artifact, there are atelectatic changes noted.  At the right lung base, right lower lobe there is appearance of peribronchial thickening with partial bronchial opacification this may relate to mucous plugging and or secretions and or aspiration, there is associated opacity, which in part may relate to infectious/inflammatory etiology however this is predominantly bandlike and therefore is thought likely related to volume loss and atelectasis, potentially associated with the aforementioned bronchial findings.  There is mild peribronchial thickening at the left lower lobe, left lung base as well with mild bronchial opacity of the small bronchi which may relate to secretions and or  aspiration and or mucous plugging.  Mild bandlike opacity at the left base consistent with atelectasis.    There is no evidence for pleural effusion and there is no evidence for pneumothorax.    The stomach is decompressed.  Cholelithiasis is noted, there is no evidence for pericholecystic inflammatory change.  The liver appears prominent.  There is no evidence for acute process of the liver, gallbladder, pancreas, or adrenal glands.  There is a lesion of the spleen measuring approximately 1.3 cm, 85 Hounsfield units, cannot be characterized as a cyst on this exam, if previously unknown ultrasound follow-up may be helpful for characterization.    Small hypodensity at the upper pole of the right kidney noted, too small for characterization.  There is no evidence for ureteral calculus or obstructive uropathy or perinephric inflammatory change bilaterally.  The arterial vascular structures including the aorta demonstrate atherosclerotic change, the abdominal aorta appears normal in caliber, demonstrates appropriate opacification.    Structures of the lower pelvis are somewhat obscured due to artifact associated with the left hip arthroplasty.  The urinary bladder otherwise appears unremarkable for degree of distention.    There is prominent appearance of the region of the lower uterine segment/cervix for which clinical and historical correlation and evaluation is recommended.  There is a lesion of the uterus measuring approximately 2.7 cm, this may relate to a fibroid.  Mildly prominent vasculature of the left pelvis can be seen with pelvic congestion syndrome.    Postoperative change of the bowel is noted, there is postoperative anastomosis at what appears to be the distal small bowel and right colon.  At the level of anastomosis there is minimal thickening of the distal small bowel and adjacent right colon, without significant inflammatory stranding or surrounding edema, however given the patient's history of Crohn's  disease the possibly of mild or early associated Crohn's flare up is to be considered, clinical and historical correlation is otherwise needed.  There is no additional evidence for inflammatory or obstructive process of the colon.  There is no evidence for free intraperitoneal air.    There is scoliotic curvature of the spine, postoperative change of the spine with associated hardware is noted.  Postoperative left hip arthroplasty is noted.    Within the subcutaneous fat planes overlying the left hip, as seen on axial image 157, series 2 there is appearance of a fluid collection, this is incompletely imaged, measuring up to 4.1 x 4.3 cm on axial imaging, however extending beyond the field of view.  This may relate to a postoperative fluid collection, may relate to a seroma or resolving hematoma, clinical and historical correlation for signs or symptoms of infection is otherwise needed.                                        CTA Chest Non-Coronary (PE Studies) (Final result)  Result time 07/16/25 21:27:22      Final result by Amarjit Gayle MD (07/16/25 21:27:22)                   Impression:      There is no large central saddle type pulmonary embolus, sensitivity of the examination is diminished as discussed above, when accounting for limitations of the exam there is no additional evidence for pulmonary embolus on this exam.    Bilateral lower lobe peribronchial thickening with partial bronchial opacity, more prominent at the right lower lobe with associated bandlike opacity potentially relating to volume loss and atelectasis as discussed above.    Small pericardial effusion.    Mild wall thickening at the level of the ileal colonic anastomosis, without significant surrounding edema or inflammation, however given the patient's history of Crohn's disease correlation for signs or symptoms of acute Crohn's flare up is needed.    Indeterminate splenic lesion, nonemergent ultrasound may be helpful for  characterization.    Prominent appearance of the region of the lower uterine segment/cervix for which clinical and historical correlation and evaluation is recommended.    Uterine lesion may represent a uterine fibroid.    Mildly prominent vasculature of the left adnexa, can be seen with pelvic congestion syndrome.    Cholelithiasis, there is no evidence for pericholecystic inflammatory change.    Fluid collection within the subcutaneous fat planes overlying the left hip, may relate to a postoperative fluid collection, may relate to seroma or resolving hematoma, clinical and historical correlation for signs or symptoms of infection is needed.    Additional findings as above.    This report was flagged in Epic as abnormal.      Electronically signed by: Amarjit Gayle  Date:    07/16/2025  Time:    21:27               Narrative:    EXAMINATION:  CTA CHEST NON CORONARY (PE STUDIES); CT ABDOMEN PELVIS WITH IV CONTRAST    CLINICAL HISTORY:  Pulmonary embolism (PE) suspected, positive D-dimer;; LUQ pain;    TECHNIQUE:  CT examination of the chest abdomen and pelvis was performed following the IV administration of 100 mL of Omnipaque 350.  Contrast timing was optimized to evaluate the pulmonary arteries.  Axial CT examination of the chest is submitted, sagittal and coronal MIP images of the chest were performed.  Single phase postcontrast axial CT examination of the abdomen and pelvis is submitted, sagittal and coronal reconstruction imaging of the abdomen and pelvis is submitted.  Oral contrast was not utilized.    COMPARISON:  Chest radiograph July 16, 2025    FINDINGS:  The degree of pulmonary arterial opacification is mildly less than optimal and there is artifact present, producing heterogeneity of the pulmonary arterial vasculature, there is also mild motion artifact, this diminishes the sensitivity of the exam, most notably with respect to the midsize and smaller pulmonary arteries, when accounting for the  aforementioned an abnormal pattern of pulmonary arterial filling defects specific for pulmonary emboli is not seen.  There is no large central saddle type pulmonary embolus.    Subcutaneous port central venous catheter noted, the distal tip extends to the level of the SVC.  The thoracic aorta demonstrates appropriate opacification.  Atherosclerotic change noted.  Small mediastinal lymph nodes are noted, nonspecific appearance.  Coronary arterial atherosclerotic change noted.  There is a small pericardial effusion noted.    The lungs demonstrate motion artifact, there are atelectatic changes noted.  At the right lung base, right lower lobe there is appearance of peribronchial thickening with partial bronchial opacification this may relate to mucous plugging and or secretions and or aspiration, there is associated opacity, which in part may relate to infectious/inflammatory etiology however this is predominantly bandlike and therefore is thought likely related to volume loss and atelectasis, potentially associated with the aforementioned bronchial findings.  There is mild peribronchial thickening at the left lower lobe, left lung base as well with mild bronchial opacity of the small bronchi which may relate to secretions and or aspiration and or mucous plugging.  Mild bandlike opacity at the left base consistent with atelectasis.    There is no evidence for pleural effusion and there is no evidence for pneumothorax.    The stomach is decompressed.  Cholelithiasis is noted, there is no evidence for pericholecystic inflammatory change.  The liver appears prominent.  There is no evidence for acute process of the liver, gallbladder, pancreas, or adrenal glands.  There is a lesion of the spleen measuring approximately 1.3 cm, 85 Hounsfield units, cannot be characterized as a cyst on this exam, if previously unknown ultrasound follow-up may be helpful for characterization.    Small hypodensity at the upper pole of the right  kidney noted, too small for characterization.  There is no evidence for ureteral calculus or obstructive uropathy or perinephric inflammatory change bilaterally.  The arterial vascular structures including the aorta demonstrate atherosclerotic change, the abdominal aorta appears normal in caliber, demonstrates appropriate opacification.    Structures of the lower pelvis are somewhat obscured due to artifact associated with the left hip arthroplasty.  The urinary bladder otherwise appears unremarkable for degree of distention.    There is prominent appearance of the region of the lower uterine segment/cervix for which clinical and historical correlation and evaluation is recommended.  There is a lesion of the uterus measuring approximately 2.7 cm, this may relate to a fibroid.  Mildly prominent vasculature of the left pelvis can be seen with pelvic congestion syndrome.    Postoperative change of the bowel is noted, there is postoperative anastomosis at what appears to be the distal small bowel and right colon.  At the level of anastomosis there is minimal thickening of the distal small bowel and adjacent right colon, without significant inflammatory stranding or surrounding edema, however given the patient's history of Crohn's disease the possibly of mild or early associated Crohn's flare up is to be considered, clinical and historical correlation is otherwise needed.  There is no additional evidence for inflammatory or obstructive process of the colon.  There is no evidence for free intraperitoneal air.    There is scoliotic curvature of the spine, postoperative change of the spine with associated hardware is noted.  Postoperative left hip arthroplasty is noted.    Within the subcutaneous fat planes overlying the left hip, as seen on axial image 157, series 2 there is appearance of a fluid collection, this is incompletely imaged, measuring up to 4.1 x 4.3 cm on axial imaging, however extending beyond the field of  view.  This may relate to a postoperative fluid collection, may relate to a seroma or resolving hematoma, clinical and historical correlation for signs or symptoms of infection is otherwise needed.                                       X-Ray Chest PA And Lateral (Final result)  Result time 07/16/25 13:51:01      Final result by Juvenal Ann DO (07/16/25 13:51:01)                   Impression:      No acute cardiopulmonary process.      Electronically signed by: Juvenal Ann  Date:    07/16/2025  Time:    13:51               Narrative:    EXAMINATION:  XR CHEST PA AND LATERAL    CLINICAL HISTORY:  Chest pain, unspecified    FINDINGS:  PA and lateral chest with comparison chest x-ray 07/02/2024.  Left subclavian Port-A-Cath again noted.  Cardiomediastinal silhouette is within normal limits. Lungs are clear. Pulmonary vasculature is normal. No acute osseous abnormality.  Thoracolumbar scoliosis with Hammond gely again noted.                                       Medications   LIDOcaine 5 % patch 2 patch (2 patches Transdermal Patch Applied 7/16/25 1924)   iohexoL (OMNIPAQUE 350) injection 100 mL (100 mLs Intravenous Given 7/16/25 1925)   aspirin tablet 325 mg (325 mg Oral Given 7/16/25 2036)   droPERidol injection 0.625 mg (0.625 mg Intravenous Given 7/16/25 2344)   diphenhydrAMINE capsule 25 mg (25 mg Oral Given 7/16/25 2344)   labetaloL injection 10 mg (10 mg Intravenous Given 7/17/25 2049)   heparin, porcine (PF) 100 unit/mL injection flush 100 Units (100 Units Intravenous Given 7/18/25 1336)     Medical Decision Making  Amount and/or Complexity of Data Reviewed  Radiology: ordered.    Risk  OTC drugs.  Prescription drug management.    On my independent interpretation EKG with rate of 54, RBBB,  no sign of acute occlusion myocardial infarction. Has some sinus arrhythmia       On my independent interpretation labs CBC, CMP, troponin, BNP, D-dimer with an excellent limits or near patient's baseline except for  D-dimer 1.35    See above for full report but per radiology chest x-ray, CT PE, CT abdomen and pelvis within acceptable limits except for pericardial effusion,At the right lung base, right lower lobe there is appearance of peribronchial thickening with partial bronchial opacification this may relate to mucous plugging and or secretions and or aspiration, there is associated opacity, which in part may relate to infectious/inflammatory etiology however this is predominantly bandlike and therefore is thought likely related to volume loss and atelectasis, potentially associated with the aforementioned bronchial findings.  There is mild peribronchial thickening at the left lower lobe, left lung base as well with mild bronchial opacity of the small bronchi which may relate to secretions and or aspiration and or mucous plugging.  Mild bandlike opacity at the left base consistent with atelectasis.     There are other incidental findings of which I reviewed with the patient at bedside in asked her to follow up all findings on her CT with her primary care doctor and Cardiology and pulmonology    Unclear etiology. Broad workup. Heart score 4. Discussed benefits/risks of hospital vs outpt fu including with cards for pericardial effusion pt prefers inpt workup. Discussed with hospitalist. For now will not treat ct read above as pneumonia. Pt with no cough, f/c. Will fu with pulm for numerus pulm findings on ct, cards for cp and pericardial effusion while in hospital. Vss. Pt updated on all findings at bedside.       Shaunna Pope MD  Emergency Medicine Staff Physician                                        Clinical Impression:  Final diagnoses:  [R07.9] Chest pain  [I31.39] Pericardial effusion          ED Disposition Condition    Observation                       [1]   Social History  Tobacco Use    Smoking status: Former     Average packs/day: 0.3 packs/day for 40.0 years (10.0 ttl pk-yrs)     Types: Cigarettes     Start  date: 11/1983    Smokeless tobacco: Never   Substance Use Topics    Alcohol use: Not Currently     Alcohol/week: 10.0 standard drinks of alcohol     Types: 10 Glasses of wine per week     Comment: socially    Drug use: Yes     Frequency: 14.0 times per week     Types: Marijuana     Comment: daily        Shaunna Pope MD  07/18/25 5469

## 2025-07-17 ENCOUNTER — CLINICAL SUPPORT (OUTPATIENT)
Dept: CARDIOLOGY | Facility: HOSPITAL | Age: 60
End: 2025-07-17
Attending: STUDENT IN AN ORGANIZED HEALTH CARE EDUCATION/TRAINING PROGRAM
Payer: MEDICAID

## 2025-07-17 VITALS — BODY MASS INDEX: 26.64 KG/M2 | WEIGHT: 179.88 LBS | HEIGHT: 69 IN

## 2025-07-17 LAB
ABSOLUTE EOSINOPHIL (SMH): 0.08 K/UL
ABSOLUTE MONOCYTE (SMH): 0.43 K/UL (ref 0.3–1)
ABSOLUTE NEUTROPHIL COUNT (SMH): 1.1 K/UL (ref 1.8–7.7)
ALBUMIN SERPL-MCNC: 4 G/DL (ref 3.5–5.2)
ALP SERPL-CCNC: 90 UNIT/L (ref 55–135)
ALT SERPL-CCNC: 11 UNIT/L (ref 10–44)
ANION GAP (SMH): 5 MMOL/L (ref 8–16)
APICAL FOUR CHAMBER EJECTION FRACTION: 58 %
APICAL TWO CHAMBER EJECTION FRACTION: 58 %
AST SERPL-CCNC: 16 UNIT/L (ref 10–40)
AV INDEX (PROSTH): 0.79
AV MEAN GRADIENT: 4 MMHG
AV PEAK GRADIENT: 8 MMHG
AV VALVE AREA BY VELOCITY RATIO: 2.2 CM²
AV VALVE AREA: 2.2 CM²
AV VELOCITY RATIO: 0.79
BASOPHILS # BLD AUTO: 0.02 K/UL
BASOPHILS NFR BLD AUTO: 0.6 %
BILIRUB SERPL-MCNC: 0.5 MG/DL (ref 0.1–1)
BSA FOR ECHO PROCEDURE: 1.99 M2
BUN SERPL-MCNC: 19 MG/DL (ref 8–23)
CALCIUM SERPL-MCNC: 9 MG/DL (ref 8.7–10.5)
CHLORIDE SERPL-SCNC: 104 MMOL/L (ref 95–110)
CO2 SERPL-SCNC: 30 MMOL/L (ref 23–29)
CREAT SERPL-MCNC: 0.7 MG/DL (ref 0.5–1.4)
CV ECHO LV RWT: 0.54 CM
DOP CALC AO PEAK VEL: 1.4 M/S
DOP CALC AO VTI: 30.8 CM
DOP CALC LVOT AREA: 2.8 CM2
DOP CALC LVOT DIAMETER: 1.9 CM
DOP CALC LVOT PEAK VEL: 1.1 M/S
DOP CALC LVOT STROKE VOLUME: 69.1 CM3
DOP CALC MV VTI: 24 CM
DOP CALCLVOT PEAK VEL VTI: 24.4 CM
E WAVE DECELERATION TIME: 218 MSEC
E/A RATIO: 0.76
E/E' RATIO: 11 M/S
ECHO LV POSTERIOR WALL: 1.1 CM (ref 0.6–1.1)
ERYTHROCYTE [DISTWIDTH] IN BLOOD BY AUTOMATED COUNT: 13.2 % (ref 11.5–14.5)
FRACTIONAL SHORTENING: 36.6 % (ref 28–44)
GFR SERPLBLD CREATININE-BSD FMLA CKD-EPI: >60 ML/MIN/1.73/M2
GLUCOSE SERPL-MCNC: 98 MG/DL (ref 70–110)
HCT VFR BLD AUTO: 36.8 % (ref 37–48.5)
HGB BLD-MCNC: 11.9 GM/DL (ref 12–16)
IMM GRANULOCYTES # BLD AUTO: 0 K/UL (ref 0–0.04)
IMM GRANULOCYTES NFR BLD AUTO: 0 % (ref 0–0.5)
INTERVENTRICULAR SEPTUM: 1.1 CM (ref 0.6–1.1)
IVC DIAMETER: 0.5 CM
LEFT ATRIUM AREA SYSTOLIC (APICAL 2 CHAMBER): 18.6 CM2
LEFT ATRIUM AREA SYSTOLIC (APICAL 4 CHAMBER): 21.1 CM2
LEFT INTERNAL DIMENSION IN SYSTOLE: 2.6 CM (ref 2.1–4)
LEFT VENTRICLE DIASTOLIC VOLUME INDEX: 37.56 ML/M2
LEFT VENTRICLE DIASTOLIC VOLUME: 74 ML
LEFT VENTRICLE END DIASTOLIC VOLUME APICAL 2 CHAMBER: 68.8 ML
LEFT VENTRICLE END DIASTOLIC VOLUME APICAL 4 CHAMBER: 86.1 ML
LEFT VENTRICLE END SYSTOLIC VOLUME APICAL 2 CHAMBER: 52.6 ML
LEFT VENTRICLE END SYSTOLIC VOLUME APICAL 4 CHAMBER: 47.9 ML
LEFT VENTRICLE MASS INDEX: 76.8 G/M2
LEFT VENTRICLE SYSTOLIC VOLUME INDEX: 12.7 ML/M2
LEFT VENTRICLE SYSTOLIC VOLUME: 25 ML
LEFT VENTRICULAR INTERNAL DIMENSION IN DIASTOLE: 4.1 CM (ref 3.5–6)
LEFT VENTRICULAR MASS: 151.3 G
LV LATERAL E/E' RATIO: 10.4 M/S
LV SEPTAL E/E' RATIO: 12.2 M/S
LVED V (TEICH): 74.2 ML
LVES V (TEICH): 24.6 ML
LVOT MG: 2 MMHG
LVOT MV: 0.7 CM/S
LYMPHOCYTES # BLD AUTO: 1.78 K/UL (ref 1–4.8)
MAGNESIUM SERPL-MCNC: 1.9 MG/DL (ref 1.6–2.6)
MCH RBC QN AUTO: 32.3 PG (ref 27–31)
MCHC RBC AUTO-ENTMCNC: 32.3 G/DL (ref 32–36)
MCV RBC AUTO: 100 FL (ref 82–98)
MV MEAN GRADIENT: 2 MMHG
MV PEAK A VEL: 0.96 M/S
MV PEAK E VEL: 0.73 M/S
MV PEAK GRADIENT: 6 MMHG
MV STENOSIS PRESSURE HALF TIME: 57 MS
MV VALVE AREA BY CONTINUITY EQUATION: 2.88 CM2
MV VALVE AREA P 1/2 METHOD: 3.86 CM2
NUCLEATED RBC (/100WBC) (SMH): 0 /100 WBC
OHS CV CPX PATIENT HEIGHT IN: 69
OHS LV EJECTION FRACTION SIMPSONS BIPLANE MOD: 58 %
PISA TR MAX VEL: 2.4 M/S
PLATELET # BLD AUTO: 232 K/UL (ref 150–450)
PMV BLD AUTO: 11 FL (ref 9.2–12.9)
POTASSIUM SERPL-SCNC: 4 MMOL/L (ref 3.5–5.1)
PROCALCITONIN SERPL-MCNC: <0.05 NG/ML
PROT SERPL-MCNC: 7.1 GM/DL (ref 6–8.4)
PV MV: 0.68 M/S
PV PEAK GRADIENT: 4 MMHG
PV PEAK VELOCITY: 0.98 M/S
RA PRESSURE ESTIMATED: 3 MMHG
RBC # BLD AUTO: 3.68 M/UL (ref 4–5.4)
RELATIVE EOSINOPHIL (SMH): 2.3 % (ref 0–8)
RELATIVE LYMPHOCYTE (SMH): 52 % (ref 18–48)
RELATIVE MONOCYTE (SMH): 12.6 % (ref 4–15)
RELATIVE NEUTROPHIL (SMH): 32.5 % (ref 38–73)
RIGHT ATRIUM END SYSTOLIC VOLUME APICAL 4 CHAMBER INDEX BSA: 32.59 ML/M2
RIGHT ATRIUM VOLUME AREA LENGTH APICAL 4 CHAMBER: 64.2 ML
RV TB RVSP: 5 MMHG
RV TISSUE DOPPLER FREE WALL SYSTOLIC VELOCITY 1 (APICAL 4 CHAMBER VIEW): 11.6 CM/S
SODIUM SERPL-SCNC: 139 MMOL/L (ref 136–145)
TDI LATERAL: 0.07 M/S
TDI SEPTAL: 0.06 M/S
TDI: 0.07 M/S
TR MAX PG: 23 MMHG
TRICUSPID ANNULAR PLANE SYSTOLIC EXCURSION: 2.6 CM
TROPONIN HIGH SENSITIVE (SMH): <2.3 PG/ML
TV REST PULMONARY ARTERY PRESSURE: 26 MMHG
WBC # BLD AUTO: 3.42 K/UL (ref 3.9–12.7)
Z-SCORE OF LEFT VENTRICULAR DIMENSION IN END DIASTOLE: -3.23
Z-SCORE OF LEFT VENTRICULAR DIMENSION IN END SYSTOLE: -2.3

## 2025-07-17 PROCEDURE — 82040 ASSAY OF SERUM ALBUMIN: CPT

## 2025-07-17 PROCEDURE — 83735 ASSAY OF MAGNESIUM: CPT

## 2025-07-17 PROCEDURE — 84145 PROCALCITONIN (PCT): CPT

## 2025-07-17 PROCEDURE — 85025 COMPLETE CBC W/AUTO DIFF WBC: CPT

## 2025-07-17 PROCEDURE — G0378 HOSPITAL OBSERVATION PER HR: HCPCS

## 2025-07-17 PROCEDURE — 93306 TTE W/DOPPLER COMPLETE: CPT

## 2025-07-17 PROCEDURE — 93306 TTE W/DOPPLER COMPLETE: CPT | Mod: 26,,, | Performed by: INTERNAL MEDICINE

## 2025-07-17 PROCEDURE — 63600175 PHARM REV CODE 636 W HCPCS: Mod: TB,JZ

## 2025-07-17 PROCEDURE — 63600175 PHARM REV CODE 636 W HCPCS: Performed by: HOSPITALIST

## 2025-07-17 PROCEDURE — 25000003 PHARM REV CODE 250

## 2025-07-17 PROCEDURE — 96376 TX/PRO/DX INJ SAME DRUG ADON: CPT

## 2025-07-17 PROCEDURE — 96375 TX/PRO/DX INJ NEW DRUG ADDON: CPT

## 2025-07-17 PROCEDURE — 25000003 PHARM REV CODE 250: Performed by: STUDENT IN AN ORGANIZED HEALTH CARE EDUCATION/TRAINING PROGRAM

## 2025-07-17 RX ORDER — METHOCARBAMOL 500 MG/1
500 TABLET, FILM COATED ORAL 4 TIMES DAILY PRN
Status: DISCONTINUED | OUTPATIENT
Start: 2025-07-17 | End: 2025-07-18 | Stop reason: HOSPADM

## 2025-07-17 RX ORDER — LABETALOL HYDROCHLORIDE 5 MG/ML
10 INJECTION, SOLUTION INTRAVENOUS ONCE
Status: COMPLETED | OUTPATIENT
Start: 2025-07-17 | End: 2025-07-17

## 2025-07-17 RX ADMIN — ASPIRIN 81 MG: 81 TABLET ORAL at 08:07

## 2025-07-17 RX ADMIN — KETOROLAC TROMETHAMINE 30 MG: 30 INJECTION, SOLUTION INTRAMUSCULAR; INTRAVENOUS at 06:07

## 2025-07-17 RX ADMIN — ASPIRIN 81 MG: 81 TABLET ORAL at 10:07

## 2025-07-17 RX ADMIN — CLONAZEPAM 0.5 MG: 0.5 TABLET ORAL at 08:07

## 2025-07-17 RX ADMIN — LABETALOL HYDROCHLORIDE 10 MG: 5 INJECTION, SOLUTION INTRAVENOUS at 08:07

## 2025-07-17 RX ADMIN — CLONAZEPAM 0.5 MG: 0.5 TABLET ORAL at 10:07

## 2025-07-17 RX ADMIN — LISINOPRIL 20 MG: 20 TABLET ORAL at 10:07

## 2025-07-17 RX ADMIN — METHOCARBAMOL 500 MG: 500 TABLET ORAL at 10:07

## 2025-07-17 NOTE — PHARMACY MED REC
"    Admission Medication History     The home medication history was taken by Iram Franklin.    You may go to "Admission" then "Reconcile Home Medications" tabs to review and/or act upon these items.     The home medication list has been updated by the Pharmacy department.   Please read ALL comments highlighted in yellow.   Please address this information as you see fit.    Feel free to contact us if you have any questions or require assistance.      The medications listed below were removed from the home medication list. Please reorder if appropriate:  Patient reports no longer taking the following medication(s):  Vit D3          Medications listed below were obtained from: Patient/family and Analytic software- [employer]  Medications Ordered Prior to Encounter[1]      Iram Franklin  [id number]              .               [1]   No current facility-administered medications on file prior to encounter.     Current Outpatient Medications on File Prior to Encounter   Medication Sig Dispense Refill    aspirin (ECOTRIN) 81 MG EC tablet Take 1 tablet (81 mg total) by mouth every 12 (twelve) hours. 60 tablet 0    clonazePAM (KLONOPIN) 0.5 MG tablet Take 0.5 mg by mouth 2 (two) times daily. At bedtme      HUMIRA,CF, PEN 40 mg/0.4 mL PnKt Inject 40 mg into the skin every 14 (fourteen) days.      lisinopriL (PRINIVIL,ZESTRIL) 20 MG tablet Take 20 mg by mouth once daily.      multivitamin (THERAGRAN) per tablet Take 1 tablet by mouth once daily.      oxyCODONE-acetaminophen (PERCOCET) 5-325 mg per tablet Take 1 tablet by mouth every 8 (eight) hours as needed for Pain. 21 tablet 0    traMADoL (ULTRAM) 50 mg tablet Take 50 mg by mouth every 8 (eight) hours as needed for Pain.      [DISCONTINUED] adalimumab (HUMIRA) 10 mg/0.2 mL SyKt Inject 40 mg into the skin every 14 (fourteen) days.      [DISCONTINUED] gabapentin (NEURONTIN) 300 MG capsule Take 1 capsule (300 mg total) by mouth 2 (two) times daily. (Patient not taking: Reported on " 7/15/2025) 60 capsule 0    [DISCONTINUED] ondansetron (ZOFRAN) 4 MG tablet Take 1 tablet (4 mg total) by mouth every 8 (eight) hours as needed for Nausea. (Patient not taking: Reported on 7/15/2025) 30 tablet 0    [DISCONTINUED] promethazine HCl (PROMETHAZINE ORAL) Take by mouth.      [DISCONTINUED] vitamin D (VITAMIN D3) 1000 units Tab Take 1,000 Units by mouth every morning.

## 2025-07-17 NOTE — H&P
[facility] - Emergency Dept  Hospital Medicine  History & Physical    Patient Name: Spencer Levi  MRN: [id number]  Patient Class: OP- Observation  Admission Date: 7/16/2025  Attending Physician: Rachael Story MD   Primary Care Provider: Miles Mabry MD         Patient information was obtained from patient, past medical records, and ER records.     Subjective:     Principal Problem:Pericardial effusion    Chief Complaint:   Chief Complaint   Patient presents with    Chest Pain     Pt c/o rib pain on left side. Hx of pna.         HPI:     60-year-old presenting with left-sided chest pain since last night.  Worse with deep breath.  Radiating to the back.  Had hip replacement May 20, 2025.  History of Crohn's.  Patient says she would not describe the sensation as a pain but she said that it hurts quite a bit.  She said she does not want to call it a chest pain because she is worried about her lung and therefore wants to call it a long pain.  We discussed that we need to keep the differential broad so we do not miss other possibilities that may be causing her pain but we will also evaluate her lawn.  She is concerned about her lung because she said she gets pneumonia once per year and she has needed her left long drained in the past and this is what it feels like.       She reports she was feeling totally fine yesterday and then woke up with this discomfort to her left lower chest area wrapping around under her left breast into the left mid back.  She reports that she does not think she did any exertional activities that could have pulled a muscle here.       When she is sitting still she still has a soreness in this area but when she takes a deep breath or when she walks it becomes worse.  I discussed with her that she had told the tele triage nurse practitioner that she had shortness breath with exertion.  She said she denies this but then also says that she does have shortness of breath with  exertion chronically and thinks this is because she is old she does not think that has worse recently.       She reports that she used to smoke and used recreational drugs but says that she has not used any recreational drugs accept marijuana for many years.      When asked where her pain is she is pointing to the left chest under the breast and as well as the left upper quadrant.  She denies abdominal pain but reports that this is where her pain is.  Again she says she does not want to see abdomen or chest because she is more worried about her long.     No fevers, chills, sore throat, runny nose, nausea, vomiting, diaphoresis, lightheadedness, dizziness, sensation changes to the face or extremities, vision changes, dysuria, urinary frequency, vaginal discharge, pelvic pain, rash, swelling.      No history of DVT or PE, no leg pain or leg swelling or skin changes of the legs, or hemoptysis or malignancy with treatment within 6 months or palliative.  Had left hip surgery at the end of may.      Reports that her discomfort is moderate but she declines any symptomatic treatment    Past Medical History:   Diagnosis Date    Achalasia     Anxiety U    Asthma     Crohn's disease     Depression U    History of drug abuse     Menopause     Nausea & vomiting U    Peptic ulcer disease U    Scoliosis U       Past Surgical History:   Procedure Laterality Date    ABDOMINAL SURGERY      removal of colon and intestines    APPENDECTOMY      BACK SURGERY      gely    COLECTOMY      COLECTOMY  2007    COLONOSCOPY  2014    COLONOSCOPY  10/30/2017    COLONOSCOPY N/A 10/21/2019    Procedure: COLONOSCOPY;  Surgeon: Felicita Miller MD;  Location: [facility];  Service: Endoscopy;  Laterality: N/A;    ESOPHAGEAL DILATION N/A 08/05/2019    Procedure: DILATION, ESOPHAGUS;  Surgeon: Felicita Miller MD;  Location: [facility];  Service: Endoscopy;  Laterality: N/A;    ESOPHAGOGASTRODUODENOSCOPY  05/12/2014    ESOPHAGOGASTRODUODENOSCOPY N/A  08/05/2019    Procedure: EGD (ESOPHAGOGASTRODUODENOSCOPY);  Surgeon: Felicita Miller MD;  Location: [facility] ENDO;  Service: Endoscopy;  Laterality: N/A;    ESOPHAGOGASTRODUODENOSCOPY (EGD) WITH DILATION  10/19/2015    HIP ARTHROPLASTY Left 5/28/2025    Procedure: ARTHROPLASTY, HIP, LEFT;  Surgeon: Constantin Murray MD;  Location: [facility] OR;  Service: Orthopedics;  Laterality: Left;  Dago    INSERTION OF VENOUS ACCESS PORT  2007    TUBAL LIGATION         Review of patient's allergies indicates:   Allergen Reactions    Morphine Hives     No benefit      Azathioprine Rash     Pancreatitis      Codeine Rash       No current facility-administered medications on file prior to encounter.     Current Outpatient Medications on File Prior to Encounter   Medication Sig    adalimumab (HUMIRA) 10 mg/0.2 mL SyKt Inject 40 mg into the skin every 14 (fourteen) days.    aspirin (ECOTRIN) 81 MG EC tablet Take 1 tablet (81 mg total) by mouth every 12 (twelve) hours.    clonazePAM (KLONOPIN) 0.5 MG tablet Take 0.5 mg by mouth 2 (two) times daily. At bedtme    gabapentin (NEURONTIN) 300 MG capsule Take 1 capsule (300 mg total) by mouth 2 (two) times daily. (Patient not taking: Reported on 7/15/2025)    HUMIRA,CF, PEN 40 mg/0.4 mL PnKt Inject 40 mg into the skin.    lisinopriL (PRINIVIL,ZESTRIL) 20 MG tablet Take 20 mg by mouth.    multivitamin (THERAGRAN) per tablet Take 1 tablet by mouth once daily.    ondansetron (ZOFRAN) 4 MG tablet Take 1 tablet (4 mg total) by mouth every 8 (eight) hours as needed for Nausea. (Patient not taking: Reported on 7/15/2025)    oxyCODONE-acetaminophen (PERCOCET) 5-325 mg per tablet Take 1 tablet by mouth every 8 (eight) hours as needed for Pain.    promethazine HCl (PROMETHAZINE ORAL) Take by mouth.    traMADoL (ULTRAM) 50 mg tablet Take 50 mg by mouth.    vitamin D (VITAMIN D3) 1000 units Tab cholecalciferol (vitamin D3) 25 mcg (1,000 unit) tablet   Take 1 tablet every day by oral route in the morning for 30  days.     Family History       Problem Relation (Age of Onset)    Breast cancer Maternal Aunt, Maternal Grandmother          Tobacco Use    Smoking status: Former     Average packs/day: 0.3 packs/day for 40.0 years (10.0 ttl pk-yrs)     Types: Cigarettes     Start date: 11/1983    Smokeless tobacco: Never   Substance and Sexual Activity    Alcohol use: Not Currently     Alcohol/week: 10.0 standard drinks of alcohol     Types: 10 Glasses of wine per week     Comment: socially    Drug use: Yes     Frequency: 14.0 times per week     Types: Marijuana     Comment: daily    Sexual activity: Yes     Partners: Male     Birth control/protection: Condom     Review of Systems   Constitutional:  Negative for activity change, fatigue and fever.   Respiratory:  Negative for cough, shortness of breath and wheezing.    Cardiovascular:  Positive for chest pain (left chest wall pain with deep inspiration) and palpitations. Negative for leg swelling.   Gastrointestinal:  Negative for abdominal distention, abdominal pain, constipation, diarrhea, nausea and vomiting.   Endocrine: Negative for cold intolerance and heat intolerance.   Genitourinary:  Negative for difficulty urinating and dysuria.   Musculoskeletal:  Negative for arthralgias, gait problem, joint swelling and myalgias.   Allergic/Immunologic: Negative for environmental allergies.   Neurological:  Negative for dizziness, tremors, weakness, light-headedness, numbness and headaches.   Hematological:  Negative for adenopathy.     Objective:     Vital Signs (Most Recent):  Temp: 98.5 °F (36.9 °C) (07/16/25 1304)  Pulse: 69 (07/16/25 2300)  Resp: 16 (07/16/25 2226)  BP: (!) 179/86 (07/16/25 2300)  SpO2: 100 % (07/16/25 2300) Vital Signs (24h Range):  Temp:  [98.5 °F (36.9 °C)] 98.5 °F (36.9 °C)  Pulse:  [69-88] 69  Resp:  [16-18] 16  SpO2:  [99 %-100 %] 100 %  BP: (145-179)/(84-98) 179/86     Weight: 81.6 kg (180 lb)  Body mass index is 26.58 kg/m².     Physical Exam  Vitals and  nursing note reviewed.   Constitutional:       General: She is awake. She is not in acute distress.     Appearance: She is well-developed and well-groomed. She is not ill-appearing.   HENT:      Head: Normocephalic and atraumatic.   Eyes:      Extraocular Movements: Extraocular movements intact.      Conjunctiva/sclera: Conjunctivae normal.   Cardiovascular:      Rate and Rhythm: Normal rate and regular rhythm.      Pulses: Normal pulses.           Radial pulses are 2+ on the right side and 2+ on the left side.        Dorsalis pedis pulses are 2+ on the right side and 2+ on the left side.      Heart sounds: Normal heart sounds.   Pulmonary:      Effort: Pulmonary effort is normal. No tachypnea, accessory muscle usage or respiratory distress.      Breath sounds: Normal breath sounds.   Abdominal:      General: Bowel sounds are normal. There is no distension.      Palpations: Abdomen is soft.      Tenderness: There is no abdominal tenderness.   Musculoskeletal:         General: Normal range of motion.      Cervical back: Normal range of motion. No rigidity or tenderness.      Right lower leg: No edema.      Left lower leg: No edema.   Skin:     General: Skin is warm and dry.      Capillary Refill: Capillary refill takes less than 2 seconds.   Neurological:      General: No focal deficit present.      Mental Status: She is alert and oriented to person, place, and time. Mental status is at baseline.      GCS: GCS eye subscore is 4. GCS verbal subscore is 5. GCS motor subscore is 6.   Psychiatric:         Mood and Affect: Mood normal.                Significant Labs: All pertinent labs within the past 24 hours have been reviewed.  Recent Lab Results         07/16/25  1721        Albumin 4.5       ALP 93       ALT 13       Anion Gap 9       AST 21       Baso # 0.02       Basophil % 0.4       BILIRUBIN TOTAL 0.3  Comment: For infants and newborns, interpretation of results should be based   on gestational age, weight and  in agreement with clinical   observations.    Premature Infant recommended reference ranges:   0-24 hours:  <8.0 mg/dL   24-48 hours: <12.0 mg/dL   3-5 days:    <15.0 mg/dL   6-29 days:   <15.0 mg/dL       BNP 12  Comment: Values of less than 100 pg/ml are consistent with non-CHF populations.       BUN 23       Calcium 9.3       Chloride 103       CO2 26       Creatinine 0.9       D-Dimer 1.35  Comment: The quantitative D-dimer assay should be used as an aid in the diagnosis of deep vein thrombosis and pulmonary embolism in patients with the appropriate presentation and clinical history. The upper limit of the reference interval and the clinical cut off point are identical. Causes of a positive (>0.50 mg/L FEU) D-Dimer test include, but are not limited to: DVT, PE, DIC, thrombolytic therapy, anticoagulant therapy, recent surgery, trauma, or pregnancy, disseminated malignancy, aortic aneurysm, cirrhosis, and severe infection. False negative results may occur in patients with distal DVT.       eGFR >60       Eos # 0.12       Eos % 2.2       Glucose 88       Hematocrit 40.1       Hemoglobin 12.9       Hepatitis C Ab Non-Reactive       HIV 1/2 Ag/Ab Non-Reactive       Immature Grans (Abs) 0.01  Comment: Mild elevation in immature granulocytes is non specific and can be seen in a variety of conditions including stress response, acute inflammation, trauma and pregnancy. Correlation with other laboratory and clinical findings is essential.       Immature Granulocytes 0.2       Lymph # 2.98       LYMPH % 55.3       MCH 32.7       MCHC 32.2              Mono # 0.47       Mono % 8.7       MPV 10.9       Neut # 1.8       Neut % 33.2       nRBC 0       Platelet Count 240       Potassium 4.1       PROTEIN TOTAL 7.9       RBC 3.95       RDW 13.3       Sodium 138       Troponin I High Sensitivity 2.5  Comment: Troponin results differ between methods. Do not use   results between Troponin methods interchangeably.    Alkaline  Phospatase levels above 400 U/L may   cause false positive results.    Access hsTnI should not be used for patients taking   Asfotase diane (Strensiq).       WBC 5.39               Significant Imaging: I have reviewed all pertinent imaging results/findings within the past 24 hours.  I have reviewed and interpreted all pertinent imaging results/findings within the past 24 hours.  Assessment/Plan:     Assessment & Plan  Pericardial effusion  Small pericardial effusion seen on CT chest    - Currently asymptomatic  - Echo pending  - Cardiology consulted  - No recent viral illnesses    Crohn disease  Chronic, stable.    Pleurisy  Left chest wall pain with deep inspiration    - CTA chest negative for pleural effusion or empyema  - Pain control    History of total hip arthroplasty  Left total hip arthroplasty on 05/28/2025.    - Postsurgical changes noted on CT pelvis  - Pain control    Primary hypertension  Patient's blood pressure range in the last 24 hours was: BP  Min: 145/84  Max: 179/86.The patient's inpatient anti-hypertensive regimen is listed below:  Current Antihypertensives  lisinopriL tablet 20 mg, Daily, Oral    Plan  - BP is controlled, no changes needed to their regimen  - Resume home medication  VTE Risk Mitigation (From admission, onward)           Ordered     enoxaparin injection 40 mg  Daily         07/16/25 2324     IP VTE HIGH RISK PATIENT  Once         07/16/25 2324     Place sequential compression device  Until discontinued         07/16/25 2324                    Advance Care Planning     Code Status: Full Code  I engaged the the patient in a voluntary conversation about the patient's preferences for care at the very end of life. The patient wishes to have CPR and all other invasive treatments performed when her heart and/or breathing stops. I communicated to the patient that a full code will be placed in the chart.    A total of 17 min was spent on advance care planning, goals of care discussion,  emotional support, formulating and communicating prognosis and exploring burden/benefit of various approaches of treatment. This discussion occurred on a fully voluntary basis with the verbal consent of the patient and/or family.           On 07/16/2025, patient should be placed in hospital observation services under my care in collaboration with Dr. Sotry.           Nelson Bennett, [facility]-BC  Department of Hospital Medicine  [facility] - Emergency Dept

## 2025-07-17 NOTE — ASSESSMENT & PLAN NOTE
Left total hip arthroplasty on 05/28/2025.    - Postsurgical changes noted on CT pelvis  - Pain control

## 2025-07-17 NOTE — PLAN OF CARE
[facility]  Initial Discharge Assessment       Primary Care Provider: Miles Mabry MD    Admission Diagnosis: Pericardial effusion [I31.39]    Admission Date: 7/16/2025  Expected Discharge Date: 7/18/2025    SW met with patient bedside. SW verified demographics, insurance, supports, and PCP.  Patient reported she lives in the home with her son and daughter and drives self to appointments. SW assessed patient's needs. Patient is able to complete ADLs independently. Patient verified at home DME: rolling walker.  Patient verified No HH, No Dialysis, No Blood Thinners, and No Oxygen.     Patient verified pharmacy of choice: [facility]  Patient confirmed she will drive self home at the time of discharge.     Transition of Care Barriers: None    Payor: MEDICAID / Plan: [place] [employer]CT / Product Type: Managed Medicaid /     Extended Emergency Contact Information  Primary Emergency Contact: Didier Levi  Mobile Phone: [phone]  Relation: Son  Preferred language: English  Secondary Emergency Contact: Krishna lindquist  Mobile Phone: [phone]  Relation: Friend    Discharge Plan A: Home with family  Discharge Plan B: Home      [facility] - [place], [place] - [address]  [address]  [place] [address]  Phone: [phone] Fax: [phone]      Initial Assessment (most recent)       Adult Discharge Assessment - 07/17/25 1551          Discharge Assessment    Assessment Type Discharge Planning Assessment     Confirmed/corrected address, phone number and insurance Yes     Confirmed Demographics Correct on Facesheet     Source of Information patient     Communicated MAISHA with patient/caregiver Date not available/Unable to determine     People in Home child(kristy), adult     Do you expect to return to your current living situation? Yes     Do you have help at home or someone to help you manage your care at home? No     Prior to hospitilization cognitive status: Unable  to Assess     Current cognitive status: Alert/Oriented     Walking or Climbing Stairs Difficulty no     Dressing/Bathing Difficulty no     Home Accessibility stairs to enter home;not wheelchair accessible     Number of Stairs, Main Entrance four     Home Layout Able to live on 1st floor     Equipment Currently Used at Home walker, rolling     Readmission within 30 days? No     Patient currently being followed by outpatient case management? No     Do you currently have service(s) that help you manage your care at home? No     Do you take prescription medications? Yes     Do you have prescription coverage? Yes     Do you have any problems affording any of your prescribed medications? No     Is the patient taking medications as prescribed? yes     Who is going to help you get home at discharge? self     How do you get to doctors appointments? car, drives self     Are you on dialysis? No     Do you take coumadin? No     Discharge Plan A Home with family     Discharge Plan B Home     DME Needed Upon Discharge  none     Discharge Plan discussed with: Patient     Transition of Care Barriers None

## 2025-07-17 NOTE — SUBJECTIVE & OBJECTIVE
Interval History:  Seen and examined at bedside during morning multidisciplinary rounds.  She denies any chest pain at present, does admit to generalized body aches including back pain which she blames on the stretcher from the ED. she is requesting something for this, ordered Robaxin.  Pending Cardiology and echo today.    Review of Systems   All other systems reviewed and are negative.    Objective:     Vital Signs (Most Recent):  Temp: 98.5 °F (36.9 °C) (07/16/25 1304)  Pulse: 75 (07/17/25 0819)  Resp: 16 (07/16/25 2358)  BP: (!) 167/100 (07/17/25 0819)  SpO2: 97 % (07/17/25 0819) Vital Signs (24h Range):  Temp:  [98.5 °F (36.9 °C)] 98.5 °F (36.9 °C)  Pulse:  [56-88] 75  Resp:  [16-18] 16  SpO2:  [97 %-100 %] 97 %  BP: (128-179)/() 167/100     Weight: 81.6 kg (180 lb)  Body mass index is 26.58 kg/m².  No intake or output data in the 24 hours ending 07/17/25 0831      Physical Exam  Constitutional:       Appearance: Normal appearance.   HENT:      Head: Normocephalic.      Right Ear: Tympanic membrane normal.      Left Ear: Tympanic membrane normal.      Nose: Nose normal.   Eyes:      Pupils: Pupils are equal, round, and reactive to light.   Cardiovascular:      Rate and Rhythm: Normal rate and regular rhythm.   Pulmonary:      Effort: Pulmonary effort is normal.      Breath sounds: Normal breath sounds.   Abdominal:      General: Abdomen is flat.      Palpations: Abdomen is soft.   Musculoskeletal:         General: Normal range of motion.      Cervical back: Normal range of motion.   Skin:     General: Skin is warm and dry.   Neurological:      General: No focal deficit present.      Mental Status: She is alert.   Psychiatric:         Mood and Affect: Mood normal.

## 2025-07-17 NOTE — SUBJECTIVE & OBJECTIVE
Past Medical History:   Diagnosis Date    Achalasia     Anxiety U    Asthma     Crohn's disease     Depression U    History of drug abuse     Menopause     Nausea & vomiting U    Peptic ulcer disease U    Scoliosis U       Past Surgical History:   Procedure Laterality Date    ABDOMINAL SURGERY      removal of colon and intestines    APPENDECTOMY      BACK SURGERY      gely    COLECTOMY      COLECTOMY  2007    COLONOSCOPY  2014    COLONOSCOPY  10/30/2017    COLONOSCOPY N/A 10/21/2019    Procedure: COLONOSCOPY;  Surgeon: Felicita Miller MD;  Location: [facility] ENDO;  Service: Endoscopy;  Laterality: N/A;    ESOPHAGEAL DILATION N/A 08/05/2019    Procedure: DILATION, ESOPHAGUS;  Surgeon: Felicita Miller MD;  Location: [facility] ENDO;  Service: Endoscopy;  Laterality: N/A;    ESOPHAGOGASTRODUODENOSCOPY  05/12/2014    ESOPHAGOGASTRODUODENOSCOPY N/A 08/05/2019    Procedure: EGD (ESOPHAGOGASTRODUODENOSCOPY);  Surgeon: Felicita Miller MD;  Location: [facility] ENDO;  Service: Endoscopy;  Laterality: N/A;    ESOPHAGOGASTRODUODENOSCOPY (EGD) WITH DILATION  10/19/2015    HIP ARTHROPLASTY Left 5/28/2025    Procedure: ARTHROPLASTY, HIP, LEFT;  Surgeon: Constantin Murray MD;  Location: [facility] OR;  Service: Orthopedics;  Laterality: Left;  Dago    INSERTION OF VENOUS ACCESS PORT  2007    TUBAL LIGATION         Review of patient's allergies indicates:   Allergen Reactions    Morphine Hives     No benefit      Azathioprine Rash     Pancreatitis      Codeine Rash       No current facility-administered medications on file prior to encounter.     Current Outpatient Medications on File Prior to Encounter   Medication Sig    adalimumab (HUMIRA) 10 mg/0.2 mL SyKt Inject 40 mg into the skin every 14 (fourteen) days.    aspirin (ECOTRIN) 81 MG EC tablet Take 1 tablet (81 mg total) by mouth every 12 (twelve) hours.    clonazePAM (KLONOPIN) 0.5 MG tablet Take 0.5 mg by mouth 2 (two) times daily. At bedtme    gabapentin (NEURONTIN) 300 MG capsule Take 1  capsule (300 mg total) by mouth 2 (two) times daily. (Patient not taking: Reported on 7/15/2025)    HUMIRA,CF, PEN 40 mg/0.4 mL PnKt Inject 40 mg into the skin.    lisinopriL (PRINIVIL,ZESTRIL) 20 MG tablet Take 20 mg by mouth.    multivitamin (THERAGRAN) per tablet Take 1 tablet by mouth once daily.    ondansetron (ZOFRAN) 4 MG tablet Take 1 tablet (4 mg total) by mouth every 8 (eight) hours as needed for Nausea. (Patient not taking: Reported on 7/15/2025)    oxyCODONE-acetaminophen (PERCOCET) 5-325 mg per tablet Take 1 tablet by mouth every 8 (eight) hours as needed for Pain.    promethazine HCl (PROMETHAZINE ORAL) Take by mouth.    traMADoL (ULTRAM) 50 mg tablet Take 50 mg by mouth.    vitamin D (VITAMIN D3) 1000 units Tab cholecalciferol (vitamin D3) 25 mcg (1,000 unit) tablet   Take 1 tablet every day by oral route in the morning for 30 days.     Family History       Problem Relation (Age of Onset)    Breast cancer Maternal Aunt, Maternal Grandmother          Tobacco Use    Smoking status: Former     Average packs/day: 0.3 packs/day for 40.0 years (10.0 ttl pk-yrs)     Types: Cigarettes     Start date: 11/1983    Smokeless tobacco: Never   Substance and Sexual Activity    Alcohol use: Not Currently     Alcohol/week: 10.0 standard drinks of alcohol     Types: 10 Glasses of wine per week     Comment: socially    Drug use: Yes     Frequency: 14.0 times per week     Types: Marijuana     Comment: daily    Sexual activity: Yes     Partners: Male     Birth control/protection: Condom     Review of Systems   Constitutional:  Negative for activity change, fatigue and fever.   Respiratory:  Negative for cough, shortness of breath and wheezing.    Cardiovascular:  Positive for chest pain (left chest wall pain with deep inspiration) and palpitations. Negative for leg swelling.   Gastrointestinal:  Negative for abdominal distention, abdominal pain, constipation, diarrhea, nausea and vomiting.   Endocrine: Negative for cold  intolerance and heat intolerance.   Genitourinary:  Negative for difficulty urinating and dysuria.   Musculoskeletal:  Negative for arthralgias, gait problem, joint swelling and myalgias.   Allergic/Immunologic: Negative for environmental allergies.   Neurological:  Negative for dizziness, tremors, weakness, light-headedness, numbness and headaches.   Hematological:  Negative for adenopathy.     Objective:     Vital Signs (Most Recent):  Temp: 98.5 °F (36.9 °C) (07/16/25 1304)  Pulse: 69 (07/16/25 2300)  Resp: 16 (07/16/25 2226)  BP: (!) 179/86 (07/16/25 2300)  SpO2: 100 % (07/16/25 2300) Vital Signs (24h Range):  Temp:  [98.5 °F (36.9 °C)] 98.5 °F (36.9 °C)  Pulse:  [69-88] 69  Resp:  [16-18] 16  SpO2:  [99 %-100 %] 100 %  BP: (145-179)/(84-98) 179/86     Weight: 81.6 kg (180 lb)  Body mass index is 26.58 kg/m².     Physical Exam  Vitals and nursing note reviewed.   Constitutional:       General: She is awake. She is not in acute distress.     Appearance: She is well-developed and well-groomed. She is not ill-appearing.   HENT:      Head: Normocephalic and atraumatic.   Eyes:      Extraocular Movements: Extraocular movements intact.      Conjunctiva/sclera: Conjunctivae normal.   Cardiovascular:      Rate and Rhythm: Normal rate and regular rhythm.      Pulses: Normal pulses.           Radial pulses are 2+ on the right side and 2+ on the left side.        Dorsalis pedis pulses are 2+ on the right side and 2+ on the left side.      Heart sounds: Normal heart sounds.   Pulmonary:      Effort: Pulmonary effort is normal. No tachypnea, accessory muscle usage or respiratory distress.      Breath sounds: Normal breath sounds.   Abdominal:      General: Bowel sounds are normal. There is no distension.      Palpations: Abdomen is soft.      Tenderness: There is no abdominal tenderness.   Musculoskeletal:         General: Normal range of motion.      Cervical back: Normal range of motion. No rigidity or tenderness.       Right lower leg: No edema.      Left lower leg: No edema.   Skin:     General: Skin is warm and dry.      Capillary Refill: Capillary refill takes less than 2 seconds.   Neurological:      General: No focal deficit present.      Mental Status: She is alert and oriented to person, place, and time. Mental status is at baseline.      GCS: GCS eye subscore is 4. GCS verbal subscore is 5. GCS motor subscore is 6.   Psychiatric:         Mood and Affect: Mood normal.                Significant Labs: All pertinent labs within the past 24 hours have been reviewed.  Recent Lab Results         07/16/25  1721        Albumin 4.5       ALP 93       ALT 13       Anion Gap 9       AST 21       Baso # 0.02       Basophil % 0.4       BILIRUBIN TOTAL 0.3  Comment: For infants and newborns, interpretation of results should be based   on gestational age, weight and in agreement with clinical   observations.    Premature Infant recommended reference ranges:   0-24 hours:  <8.0 mg/dL   24-48 hours: <12.0 mg/dL   3-5 days:    <15.0 mg/dL   6-29 days:   <15.0 mg/dL       BNP 12  Comment: Values of less than 100 pg/ml are consistent with non-CHF populations.       BUN 23       Calcium 9.3       Chloride 103       CO2 26       Creatinine 0.9       D-Dimer 1.35  Comment: The quantitative D-dimer assay should be used as an aid in the diagnosis of deep vein thrombosis and pulmonary embolism in patients with the appropriate presentation and clinical history. The upper limit of the reference interval and the clinical cut off point are identical. Causes of a positive (>0.50 mg/L FEU) D-Dimer test include, but are not limited to: DVT, PE, DIC, thrombolytic therapy, anticoagulant therapy, recent surgery, trauma, or pregnancy, disseminated malignancy, aortic aneurysm, cirrhosis, and severe infection. False negative results may occur in patients with distal DVT.       eGFR >60       Eos # 0.12       Eos % 2.2       Glucose 88       Hematocrit 40.1        Hemoglobin 12.9       Hepatitis C Ab Non-Reactive       HIV 1/2 Ag/Ab Non-Reactive       Immature Grans (Abs) 0.01  Comment: Mild elevation in immature granulocytes is non specific and can be seen in a variety of conditions including stress response, acute inflammation, trauma and pregnancy. Correlation with other laboratory and clinical findings is essential.       Immature Granulocytes 0.2       Lymph # 2.98       LYMPH % 55.3       MCH 32.7       MCHC 32.2              Mono # 0.47       Mono % 8.7       MPV 10.9       Neut # 1.8       Neut % 33.2       nRBC 0       Platelet Count 240       Potassium 4.1       PROTEIN TOTAL 7.9       RBC 3.95       RDW 13.3       Sodium 138       Troponin I High Sensitivity 2.5  Comment: Troponin results differ between methods. Do not use   results between Troponin methods interchangeably.    Alkaline Phospatase levels above 400 U/L may   cause false positive results.    Access hsTnI should not be used for patients taking   Asfotase diane (Strensiq).       WBC 5.39               Significant Imaging: I have reviewed all pertinent imaging results/findings within the past 24 hours.  I have reviewed and interpreted all pertinent imaging results/findings within the past 24 hours.

## 2025-07-17 NOTE — ASSESSMENT & PLAN NOTE
Patient's blood pressure range in the last 24 hours was: BP  Min: 128/66  Max: 179/86.The patient's inpatient anti-hypertensive regimen is listed below:  Current Antihypertensives  lisinopriL tablet 20 mg, Daily, Oral    Plan  - BP is controlled, no changes needed to their regimen  - Resume home medication

## 2025-07-17 NOTE — HPI
60-year-old presenting with left-sided chest pain since last night.  Worse with deep breath.  Radiating to the back.  Had hip replacement May 20, 2025.  History of Crohn's.  Patient says she would not describe the sensation as a pain but she said that it hurts quite a bit.  She said she does not want to call it a chest pain because she is worried about her lung and therefore wants to call it a long pain.  We discussed that we need to keep the differential broad so we do not miss other possibilities that may be causing her pain but we will also evaluate her lawn.  She is concerned about her lung because she said she gets pneumonia once per year and she has needed her left long drained in the past and this is what it feels like.       She reports she was feeling totally fine yesterday and then woke up with this discomfort to her left lower chest area wrapping around under her left breast into the left mid back.  She reports that she does not think she did any exertional activities that could have pulled a muscle here.       When she is sitting still she still has a soreness in this area but when she takes a deep breath or when she walks it becomes worse.  I discussed with her that she had told the tele triage nurse practitioner that she had shortness breath with exertion.  She said she denies this but then also says that she does have shortness of breath with exertion chronically and thinks this is because she is old she does not think that has worse recently.       She reports that she used to smoke and used recreational drugs but says that she has not used any recreational drugs accept marijuana for many years.      When asked where her pain is she is pointing to the left chest under the breast and as well as the left upper quadrant.  She denies abdominal pain but reports that this is where her pain is.  Again she says she does not want to see abdomen or chest because she is more worried about her long.     No  fevers, chills, sore throat, runny nose, nausea, vomiting, diaphoresis, lightheadedness, dizziness, sensation changes to the face or extremities, vision changes, dysuria, urinary frequency, vaginal discharge, pelvic pain, rash, swelling.      No history of DVT or PE, no leg pain or leg swelling or skin changes of the legs, or hemoptysis or malignancy with treatment within 6 months or palliative.  Had left hip surgery at the end of may.      Reports that her discomfort is moderate but she declines any symptomatic treatment

## 2025-07-17 NOTE — ASSESSMENT & PLAN NOTE
Small pericardial effusion seen on CT chest    - Currently asymptomatic  - Echo pending  - Cardiology consulted  - No recent viral illnesses

## 2025-07-17 NOTE — PROGRESS NOTES
[facility] - Emergency Dept  Hospital Medicine  Progress Note    Patient Name: Spencer Levi  MRN: [id number]  Patient Class: OP- Observation   Admission Date: 7/16/2025  Length of Stay: 0 days  Attending Physician: Rachael Story MD  Primary Care Provider: Miles Mabry MD        Subjective     Principal Problem:Pericardial effusion        HPI:      60-year-old presenting with left-sided chest pain since last night.  Worse with deep breath.  Radiating to the back.  Had hip replacement May 20, 2025.  History of Crohn's.  Patient says she would not describe the sensation as a pain but she said that it hurts quite a bit.  She said she does not want to call it a chest pain because she is worried about her lung and therefore wants to call it a long pain.  We discussed that we need to keep the differential broad so we do not miss other possibilities that may be causing her pain but we will also evaluate her lawn.  She is concerned about her lung because she said she gets pneumonia once per year and she has needed her left long drained in the past and this is what it feels like.       She reports she was feeling totally fine yesterday and then woke up with this discomfort to her left lower chest area wrapping around under her left breast into the left mid back.  She reports that she does not think she did any exertional activities that could have pulled a muscle here.       When she is sitting still she still has a soreness in this area but when she takes a deep breath or when she walks it becomes worse.  I discussed with her that she had told the tele triage nurse practitioner that she had shortness breath with exertion.  She said she denies this but then also says that she does have shortness of breath with exertion chronically and thinks this is because she is old she does not think that has worse recently.       She reports that she used to smoke and used recreational drugs but says that she has not  used any recreational drugs accept marijuana for many years.      When asked where her pain is she is pointing to the left chest under the breast and as well as the left upper quadrant.  She denies abdominal pain but reports that this is where her pain is.  Again she says she does not want to see abdomen or chest because she is more worried about her long.     No fevers, chills, sore throat, runny nose, nausea, vomiting, diaphoresis, lightheadedness, dizziness, sensation changes to the face or extremities, vision changes, dysuria, urinary frequency, vaginal discharge, pelvic pain, rash, swelling.      No history of DVT or PE, no leg pain or leg swelling or skin changes of the legs, or hemoptysis or malignancy with treatment within 6 months or palliative.  Had left hip surgery at the end of may.      Reports that her discomfort is moderate but she declines any symptomatic treatment    Overview/Hospital Course:  During the hospitalization patient was admitted for evaluation and management of chest pain which she as lung pain.  Imaging revealed small pericardial effusion.  Cardiology was consulted echocardiogram was ordered.    Interval History:  Seen and examined at bedside during morning multidisciplinary rounds.  She denies any chest pain at present, does admit to generalized body aches including back pain which she blames on the stretcher from the ED. she is requesting something for this, ordered Robaxin.  Pending Cardiology and echo today.    Review of Systems   All other systems reviewed and are negative.    Objective:     Vital Signs (Most Recent):  Temp: 98.5 °F (36.9 °C) (07/16/25 1304)  Pulse: 75 (07/17/25 0819)  Resp: 16 (07/16/25 2358)  BP: (!) 167/100 (07/17/25 0819)  SpO2: 97 % (07/17/25 0819) Vital Signs (24h Range):  Temp:  [98.5 °F (36.9 °C)] 98.5 °F (36.9 °C)  Pulse:  [56-88] 75  Resp:  [16-18] 16  SpO2:  [97 %-100 %] 97 %  BP: (128-179)/() 167/100     Weight: 81.6 kg (180 lb)  Body mass index  is 26.58 kg/m².  No intake or output data in the 24 hours ending 07/17/25 0831      Physical Exam  Constitutional:       Appearance: Normal appearance.   HENT:      Head: Normocephalic.      Right Ear: Tympanic membrane normal.      Left Ear: Tympanic membrane normal.      Nose: Nose normal.   Eyes:      Pupils: Pupils are equal, round, and reactive to light.   Cardiovascular:      Rate and Rhythm: Normal rate and regular rhythm.   Pulmonary:      Effort: Pulmonary effort is normal.      Breath sounds: Normal breath sounds.   Abdominal:      General: Abdomen is flat.      Palpations: Abdomen is soft.   Musculoskeletal:         General: Normal range of motion.      Cervical back: Normal range of motion.   Skin:     General: Skin is warm and dry.   Neurological:      General: No focal deficit present.      Mental Status: She is alert.   Psychiatric:         Mood and Affect: Mood normal.                   Assessment & Plan  Pericardial effusion  Still asymptomatic, echo pending, cardiology consult pending.    Crohn disease  Chronic, stable.    Pleurisy  Left chest wall pain with deep inspiration    - CTA chest negative for pleural effusion or empyema  - Pain control    History of total hip arthroplasty  Left total hip arthroplasty on 05/28/2025.    - Postsurgical changes noted on CT pelvis  - Pain control    Primary hypertension  Patient's blood pressure range in the last 24 hours was: BP  Min: 128/66  Max: 179/86.The patient's inpatient anti-hypertensive regimen is listed below:  Current Antihypertensives  lisinopriL tablet 20 mg, Daily, Oral    Plan  - BP is controlled, no changes needed to their regimen  - Resume home medication  VTE Risk Mitigation (From admission, onward)           Ordered     enoxaparin injection 40 mg  Daily         07/16/25 2324     IP VTE HIGH RISK PATIENT  Once         07/16/25 2324     Place sequential compression device  Until discontinued         07/16/25 2324                    Discharge  Planning   MAISHA:      Code Status: Full Code   Medical Readiness for Discharge Date:                            [place] NP  Department of Hospital Medicine   [facility] - Emergency Dept

## 2025-07-17 NOTE — ASSESSMENT & PLAN NOTE
Patient's blood pressure range in the last 24 hours was: BP  Min: 145/84  Max: 179/86.The patient's inpatient anti-hypertensive regimen is listed below:  Current Antihypertensives  lisinopriL tablet 20 mg, Daily, Oral    Plan  - BP is controlled, no changes needed to their regimen  - Resume home medication

## 2025-07-17 NOTE — ED NOTES
Attempted to call report to floor. Informed by staff, the nurse getting the patient is currently in a room and will have to call back.

## 2025-07-17 NOTE — ASSESSMENT & PLAN NOTE
Left chest wall pain with deep inspiration    - CTA chest negative for pleural effusion or empyema  - Pain control

## 2025-07-18 VITALS
RESPIRATION RATE: 18 BRPM | HEIGHT: 69 IN | DIASTOLIC BLOOD PRESSURE: 93 MMHG | OXYGEN SATURATION: 98 % | WEIGHT: 182.38 LBS | SYSTOLIC BLOOD PRESSURE: 159 MMHG | BODY MASS INDEX: 27.01 KG/M2 | TEMPERATURE: 98 F | HEART RATE: 62 BPM

## 2025-07-18 LAB
ABSOLUTE EOSINOPHIL (SMH): 0.09 K/UL
ABSOLUTE MONOCYTE (SMH): 0.45 K/UL (ref 0.3–1)
ABSOLUTE NEUTROPHIL COUNT (SMH): 1.3 K/UL (ref 1.8–7.7)
ALBUMIN SERPL-MCNC: 3.9 G/DL (ref 3.5–5.2)
ALP SERPL-CCNC: 86 UNIT/L (ref 55–135)
ALT SERPL-CCNC: 11 UNIT/L (ref 10–44)
ANION GAP (SMH): 7 MMOL/L (ref 8–16)
AST SERPL-CCNC: 16 UNIT/L (ref 10–40)
BASOPHILS # BLD AUTO: 0.02 K/UL
BASOPHILS NFR BLD AUTO: 0.5 %
BILIRUB SERPL-MCNC: 0.4 MG/DL (ref 0.1–1)
BUN SERPL-MCNC: 22 MG/DL (ref 8–23)
CALCIUM SERPL-MCNC: 8.9 MG/DL (ref 8.7–10.5)
CHLORIDE SERPL-SCNC: 103 MMOL/L (ref 95–110)
CO2 SERPL-SCNC: 29 MMOL/L (ref 23–29)
CREAT SERPL-MCNC: 0.8 MG/DL (ref 0.5–1.4)
EAG (SMH): 91 MG/DL (ref 68–131)
ERYTHROCYTE [DISTWIDTH] IN BLOOD BY AUTOMATED COUNT: 13 % (ref 11.5–14.5)
GFR SERPLBLD CREATININE-BSD FMLA CKD-EPI: >60 ML/MIN/1.73/M2
GLUCOSE SERPL-MCNC: 101 MG/DL (ref 70–110)
HBA1C MFR BLD: 4.8 % (ref 4.5–6.2)
HCT VFR BLD AUTO: 34.7 % (ref 37–48.5)
HGB BLD-MCNC: 11.7 GM/DL (ref 12–16)
IMM GRANULOCYTES # BLD AUTO: 0 K/UL (ref 0–0.04)
IMM GRANULOCYTES NFR BLD AUTO: 0 % (ref 0–0.5)
LYMPHOCYTES # BLD AUTO: 2.51 K/UL (ref 1–4.8)
MAGNESIUM SERPL-MCNC: 1.8 MG/DL (ref 1.6–2.6)
MCH RBC QN AUTO: 32.6 PG (ref 27–31)
MCHC RBC AUTO-ENTMCNC: 33.7 G/DL (ref 32–36)
MCV RBC AUTO: 97 FL (ref 82–98)
NUCLEATED RBC (/100WBC) (SMH): 0 /100 WBC
PLATELET # BLD AUTO: 238 K/UL (ref 150–450)
PMV BLD AUTO: 10.6 FL (ref 9.2–12.9)
POTASSIUM SERPL-SCNC: 4.1 MMOL/L (ref 3.5–5.1)
PROT SERPL-MCNC: 6.8 GM/DL (ref 6–8.4)
RBC # BLD AUTO: 3.59 M/UL (ref 4–5.4)
RELATIVE EOSINOPHIL (SMH): 2.1 % (ref 0–8)
RELATIVE LYMPHOCYTE (SMH): 57.7 % (ref 18–48)
RELATIVE MONOCYTE (SMH): 10.3 % (ref 4–15)
RELATIVE NEUTROPHIL (SMH): 29.4 % (ref 38–73)
SODIUM SERPL-SCNC: 139 MMOL/L (ref 136–145)
TSH SERPL-ACNC: 1.12 UIU/ML (ref 0.34–5.6)
WBC # BLD AUTO: 4.35 K/UL (ref 3.9–12.7)

## 2025-07-18 PROCEDURE — 84443 ASSAY THYROID STIM HORMONE: CPT | Performed by: FAMILY MEDICINE

## 2025-07-18 PROCEDURE — 85025 COMPLETE CBC W/AUTO DIFF WBC: CPT

## 2025-07-18 PROCEDURE — 63600175 PHARM REV CODE 636 W HCPCS

## 2025-07-18 PROCEDURE — 96375 TX/PRO/DX INJ NEW DRUG ADDON: CPT

## 2025-07-18 PROCEDURE — 83036 HEMOGLOBIN GLYCOSYLATED A1C: CPT | Performed by: FAMILY MEDICINE

## 2025-07-18 PROCEDURE — G0378 HOSPITAL OBSERVATION PER HR: HCPCS

## 2025-07-18 PROCEDURE — 83735 ASSAY OF MAGNESIUM: CPT

## 2025-07-18 PROCEDURE — 84075 ASSAY ALKALINE PHOSPHATASE: CPT

## 2025-07-18 PROCEDURE — 63600175 PHARM REV CODE 636 W HCPCS: Performed by: FAMILY MEDICINE

## 2025-07-18 PROCEDURE — 25000003 PHARM REV CODE 250

## 2025-07-18 RX ORDER — HEPARIN 100 UNIT/ML
100 SYRINGE INTRAVENOUS ONCE
Status: COMPLETED | OUTPATIENT
Start: 2025-07-18 | End: 2025-07-18

## 2025-07-18 RX ADMIN — ASPIRIN 81 MG: 81 TABLET ORAL at 08:07

## 2025-07-18 RX ADMIN — Medication 800 MG: at 11:07

## 2025-07-18 RX ADMIN — CLONAZEPAM 0.5 MG: 0.5 TABLET ORAL at 08:07

## 2025-07-18 RX ADMIN — HEPARIN 100 UNITS: 100 SYRINGE at 01:07

## 2025-07-18 RX ADMIN — LISINOPRIL 20 MG: 20 TABLET ORAL at 08:07

## 2025-07-18 RX ADMIN — Medication 800 MG: at 06:07

## 2025-07-18 RX ADMIN — ONDANSETRON 4 MG: 2 INJECTION INTRAMUSCULAR; INTRAVENOUS at 08:07

## 2025-07-18 NOTE — ASSESSMENT & PLAN NOTE
Patient's blood pressure range in the last 24 hours was: BP  Min: 136/75  Max: 173/105.The patient's inpatient anti-hypertensive regimen is listed below:  Current Antihypertensives  lisinopriL tablet 20 mg, Daily, Oral    Plan  - BP is controlled, no changes needed to their regimen  - Resume home medication

## 2025-07-18 NOTE — PLAN OF CARE
Problem: Adult Inpatient Plan of Care  Goal: Absence of Hospital-Acquired Illness or Injury  Outcome: Met     Problem: Adult Inpatient Plan of Care  Goal: Optimal Comfort and Wellbeing  Outcome: Met     Problem: Adult Inpatient Plan of Care  Goal: Readiness for Transition of Care  Outcome: Met

## 2025-07-18 NOTE — PLAN OF CARE
"Pt clear for DC from case management standpoint. Discharging to home. Patient will drive herself home.     Patient declined appointment setup stating, "I see Dr. Mabry once a month".      07/18/25 1254   Final Note   Assessment Type Final Discharge Note   Anticipated Discharge Disposition Home   Hospital Resources/Appts/Education Provided Patient refused appointment set-up       "

## 2025-07-18 NOTE — DISCHARGE SUMMARY
[facility]  Discharge Summary      Patient Name: Spencer Levi  MRN: [id number]  ERIK: [id number]  Patient Class: OP- Observation  Admission Date: 7/16/2025  Hospital Length of Stay: 0 days  Discharge Date and Time: 07/18/2025 2:01 PM  Attending Physician: Robe Chung DO   Discharging Provider: Robe Chung DO  Primary Care Provider: Miles Mabry MD    Primary Care Team: Networked reference to record PCT     HPI:       60-year-old presenting with left-sided chest pain since last night.  Worse with deep breath.  Radiating to the back.  Had hip replacement May 20, 2025.  History of Crohn's.  Patient says she would not describe the sensation as a pain but she said that it hurts quite a bit.  She said she does not want to call it a chest pain because she is worried about her lung and therefore wants to call it a long pain.  We discussed that we need to keep the differential broad so we do not miss other possibilities that may be causing her pain but we will also evaluate her lawn.  She is concerned about her lung because she said she gets pneumonia once per year and she has needed her left long drained in the past and this is what it feels like.       She reports she was feeling totally fine yesterday and then woke up with this discomfort to her left lower chest area wrapping around under her left breast into the left mid back.  She reports that she does not think she did any exertional activities that could have pulled a muscle here.       When she is sitting still she still has a soreness in this area but when she takes a deep breath or when she walks it becomes worse.  I discussed with her that she had told the tele triage nurse practitioner that she had shortness breath with exertion.  She said she denies this but then also says that she does have shortness of breath with exertion chronically and thinks this is because she is old she does not think that has worse recently.        She reports that she used to smoke and used recreational drugs but says that she has not used any recreational drugs accept marijuana for many years.      When asked where her pain is she is pointing to the left chest under the breast and as well as the left upper quadrant.  She denies abdominal pain but reports that this is where her pain is.  Again she says she does not want to see abdomen or chest because she is more worried about her long.     No fevers, chills, sore throat, runny nose, nausea, vomiting, diaphoresis, lightheadedness, dizziness, sensation changes to the face or extremities, vision changes, dysuria, urinary frequency, vaginal discharge, pelvic pain, rash, swelling.      No history of DVT or PE, no leg pain or leg swelling or skin changes of the legs, or hemoptysis or malignancy with treatment within 6 months or palliative.  Had left hip surgery at the end of may.      Reports that her discomfort is moderate but she declines any symptomatic treatment    * No surgery found *      Hospital Course:   During the hospitalization patient was admitted for evaluation and management of chest pain which she as lung pain.  Imaging revealed small pericardial effusion.  Cardiology was consulted & echocardiogram was ordered.  Echo was negative for pericardial effusion and discuss with Cardiology who recommended outpatient follow-up and consult was canceled.  Patient denied chest pain at discharge.  Referral placed for Cardiology and OB Gyn     Goals of Care Treatment Preferences:  Code Status: Full Code         Consults:     Assessment & Plan  Pericardial effusion  Still asymptomatic, echo pending, cardiology consult pending.    Crohn disease  Chronic, stable.    Pleurisy  Left chest wall pain with deep inspiration    - CTA chest negative for pleural effusion or empyema  - Pain control    History of total hip arthroplasty  Left total hip arthroplasty on 05/28/2025.    - Postsurgical changes noted on CT  pelvis  - Pain control    Primary hypertension  Patient's blood pressure range in the last 24 hours was: BP  Min: 136/75  Max: 173/105.The patient's inpatient anti-hypertensive regimen is listed below:  Current Antihypertensives  lisinopriL tablet 20 mg, Daily, Oral    Plan  - BP is controlled, no changes needed to their regimen  - Resume home medication  Final Active Diagnoses:    Diagnosis Date Noted POA    PRINCIPAL PROBLEM:  Pericardial effusion [I31.39] 07/16/2025 Yes    Pleurisy [R09.1] 07/16/2025 Yes    History of total hip arthroplasty [Z96.649] 07/16/2025 Not Applicable    Primary hypertension [I10] 07/16/2025 Yes    Crohn disease [K50.90] 10/21/2019 Yes      Problems Resolved During this Admission:       Discharged Condition: fair    Disposition: Home-Health Care [facility]    Follow Up:   Follow-up Information       Miles Mabry MD Follow up in 1 week(s).    Specialty: Internal Medicine  Why: Please keep scheduled appointment  Contact information:  [address] [place] [address] Dr Herrera  [facility] [address]  [phone]                           Patient Instructions:      Ambulatory referral/consult to Cardiology   Standing Status: Future   Referral Priority: Routine Referral Type: Consultation   Referral Reason: Specialty Services Required   Requested Specialty: Cardiology   Number of Visits Requested: 1     Ambulatory referral/consult to Obstetrics / Gynecology   Standing Status: Future   Referral Priority: Routine Referral Type: Consultation   Referral Reason: Specialty Services Required   Requested Specialty: Obstetrics and Gynecology   Number of Visits Requested: 1     SUBSEQUENT HOME HEALTH ORDERS     Order Specific Question Answer Comments   What Home Health Agency is the patient currently using? [facility]        Significant Diagnostic Studies: Labs: BMP:   Recent Labs   Lab 07/16/25  1721 07/17/25  0622 07/18/25  0357   GLU 88 98 101    139 139   K 4.1 4.0 4.1    104 103   CO2 26 30*  29   BUN 23 19 22   CREATININE 0.9 0.7 0.8   CALCIUM 9.3 9.0 8.9   MG  --  1.9 1.8   , CMP   Recent Labs   Lab 07/16/25 1721 07/17/25 0622 07/18/25 0357    139 139   K 4.1 4.0 4.1    104 103   CO2 26 30* 29   GLU 88 98 101   BUN 23 19 22   CREATININE 0.9 0.7 0.8   CALCIUM 9.3 9.0 8.9   PROT 7.9 7.1 6.8   ALBUMIN 4.5 4.0 3.9   BILITOT 0.3 0.5 0.4   ALKPHOS 93 90 86   AST 21 16 16   ALT 13 11 11   ANIONGAP 9 5* 7*   , and CBC   Recent Labs   Lab 07/16/25 1721 07/17/25 0622 07/18/25 0357   WBC 5.39 3.42* 4.35   HGB 12.9 11.9* 11.7*   HCT 40.1 36.8* 34.7*    232 238       Pending Diagnostic Studies:       Procedure Component Value Units Date/Time    HCV Virus Hold Specimen [[id number]] Collected: 07/16/25 1721    Order Status: Sent Lab Status: In process Updated: 07/16/25 1730    Specimen: Blood     HIV Virus Confirmation Hold Specimen [[id number]] Collected: 07/16/25 1721    Order Status: Sent Lab Status: In process Updated: 07/16/25 1730    Specimen: Blood            Medications:  Reconciled Home Medications:      Medication List        CONTINUE taking these medications      aspirin 81 MG EC tablet  Commonly known as: ECOTRIN  Take 1 tablet (81 mg total) by mouth every 12 (twelve) hours.     clonazePAM 0.5 MG tablet  Commonly known as: KlonoPIN  Take 0.5 mg by mouth 2 (two) times daily. At [facility]     HUMIRA(CF) PEN 40 mg/0.4 mL Pnkt  Generic drug: adalimumab  Inject 40 mg into the skin every 14 (fourteen) days.     lisinopriL 20 MG tablet  Commonly known as: PRINIVIL,ZESTRIL  Take 20 mg by mouth once daily.     multivitamin per tablet  Commonly known as: THERAGRAN  Take 1 tablet by mouth once daily.     oxyCODONE-acetaminophen 5-325 mg per tablet  Commonly known as: PERCOCET  Take 1 tablet by mouth every 8 (eight) hours as needed for Pain.     traMADoL 50 mg tablet  Commonly known as: ULTRAM  Take 50 mg by mouth every 8 (eight) hours as needed for Pain.              Indwelling Lines/Drains at  time of discharge:   Lines/Drains/Airways       Central Venous Catheter Line  Duration             Port A Cath Single Lumen right subclavian -- days                        Time spent on the discharge of patient: 32 minutes         Robe Chung DO  Department of Hospital Medicine  [facility]

## 2025-07-21 LAB
OHS QRS DURATION: 142 MS
OHS QTC CALCULATION: 440 MS

## 2025-07-28 ENCOUNTER — HOSPITAL ENCOUNTER (OUTPATIENT)
Dept: PREADMISSION TESTING | Facility: HOSPITAL | Age: 60
Discharge: HOME | End: 2025-07-28
Attending: ORTHOPAEDIC SURGERY
Payer: MEDICAID

## 2025-07-28 VITALS — HEIGHT: 69 IN | WEIGHT: 182 LBS | BODY MASS INDEX: 26.96 KG/M2

## 2025-07-28 DIAGNOSIS — M16.11 PRIMARY OSTEOARTHRITIS OF RIGHT HIP: ICD-10-CM

## 2025-07-28 DIAGNOSIS — Z01.818 PRE-OP TESTING: ICD-10-CM

## 2025-07-28 LAB
INDIRECT COOMBS: NORMAL
RH BLD: NORMAL
SPECIMEN OUTDATE: NORMAL

## 2025-07-28 PROCEDURE — 36415 COLL VENOUS BLD VENIPUNCTURE: CPT | Performed by: ORTHOPAEDIC SURGERY

## 2025-07-28 PROCEDURE — 86850 RBC ANTIBODY SCREEN: CPT | Performed by: ORTHOPAEDIC SURGERY

## 2025-07-28 NOTE — PRE ADMISSION SCREENING
"               CJR Risk Assessment Scale    Patient Name: Spencer Levi  YOB: 1965  MRN: [id number]            RIsk Factor Measure Recommendation Patient Data Scale/Score   BMI >40 Reconsider surgery, weight loss   Estimated body mass index is 26.88 kg/m² as calculated from the following:    Height as of this encounter: 5' 9" (1.753 m).    Weight as of this encounter: 82.6 kg (182 lb).   [] 0 = 1 - 24.9  [x] 1 = 25-29.9  [] 2 = 30-34.9  [] 3 = 35-39.9  [] 4 = 40-44.9  [] 5 = 45-99.9   Hemoglobin AIC (if applicable) >9 Delay surgery until DM under control  Refer for:  Nutrition Therapy  Exercise   Medication    Lab Results   Component Value Date    HGBA1C 4.8 07/18/2025       Lab Results   Component Value Date     07/18/2025      [x] 0 = 4.0-5.6  [] 1 = 5.7-6.4  [] 2 = 6.5-6.9  [] 3 = 7.0-7.9  [] 4 = 8.0-8.9  [] 5 = 9.0-12   Hemoglobin (Anemia) <9 Delay surgery   Correct anemia Lab Results   Component Value Date    HGB 11.7 (L) 07/18/2025    [] 20 - <9.0                    Albumin <3 Delay surgery &Workup Lab Results   Component Value Date    ALBUMIN 3.9 07/18/2025    [] 20 - <3.0   Smoking Cessation >4 Weeks Delay Surgery  Refer to OP Cessation Class    Former Smoker  Quit: 2023 [] 20 - current smoker                                _____ PPD                    Hx of MI, PE, Arrhythmia, CVA, DVT <30 Days Delay Surgery    N/A [] 20      Infection Variable Delay surgery and re-evaluate   N/A [] 20 - recent/current infection     Depression (PHQ) >10 out of 27 Delay Surgery and re-evaluate  Medication  Counseling              [x] 0     []1     []2     []3      []4      [] 5                    (1-4)      (5-9)  (10-14)  (15-19)   (20-27)     Memory Impairment & Memory loss (Mini-Cog Screening Tool) Advanced dementia and/or Parkinson's Reconsider surgery     [x] 0     []1     []2     []3     []4     [] 5     Physical Conditioning (Modified AM-PAC Per Physical Therapy at [facility]) Unable to ambulate on " day of surgery Delay surgery and re-evaluate  Pre-Rehabilitation   (PT evaluation)       [x]  0   []4       []8     []12        []16     []20       (<20%)   (<40%)   (<60%)   (<80% )    (>80%)     Home Environment/Caregiver support  (Per /Navigator Interview)    Availability of basic services and/or approprate assistance during post-operative period Delay surgery and re-evaluate  Safe home environment  Health   1 week post-surgery  Transportation  availability  Ability to obtain DME/Medications post-op    [x] 0     []1     []2     []3     []4     [] 5  [x] 0     []1     []2     []3     []4     [] 5  [x] 0     []1     []2     []3     []4     [] 5  [x] 0     []1     []2     []3     []4     [] 5         MD Contact: Dr. Murray Comments:  Total Score:  1

## 2025-07-28 NOTE — PRE ADMISSION SCREENING
JOINT CAMP ASSESSMENT    Name Spencer Levi   MRN [id number]    Age/Sex 60 y.o. female    Surgeon Dr. Constantin Murray   Joint Camp Date 7/28/2025   Surgery Date 8/11/2025   Procedure Right Hip Arthroplasty   Insurance Payor: MEDICAID / Plan: [facility] CONNECT / Product Type: Managed Medicaid /    Care Team Patient Care Team:  Miles Mabry MD as PCP - General (Internal Medicine)  Felicita Miller MD as Consulting Physician (Gastroenterology)    Pharmacy   [facility] [address] [facility] [address]  [address] [address] [address]  Phone: [phone] Fax: [phone]     AM-PAC Score   24   Risk Assessment Score 1     Past Medical History:   Diagnosis Date    Achalasia     Anxiety U    Asthma     Crohn's disease     Depression U    History of drug abuse     Menopause     Nausea & vomiting U    Peptic ulcer disease U    Scoliosis U       Past Surgical History:   Procedure Laterality Date    ABDOMINAL SURGERY      removal of colon and intestines    APPENDECTOMY      BACK SURGERY      gely    COLECTOMY      COLECTOMY  2007    COLONOSCOPY  2014    COLONOSCOPY  10/30/2017    COLONOSCOPY N/A 10/21/2019    Procedure: COLONOSCOPY;  Surgeon: Felicita Miller MD;  Location: [facility];  Service: Endoscopy;  Laterality: N/A;    ESOPHAGEAL DILATION N/A 08/05/2019    Procedure: DILATION, ESOPHAGUS;  Surgeon: Felicita Miller MD;  Location: [facility];  Service: Endoscopy;  Laterality: N/A;    ESOPHAGOGASTRODUODENOSCOPY  05/12/2014    ESOPHAGOGASTRODUODENOSCOPY N/A 08/05/2019    Procedure: EGD (ESOPHAGOGASTRODUODENOSCOPY);  Surgeon: Felicita Miller MD;  Location: [facility];  Service: Endoscopy;  Laterality: N/A;    ESOPHAGOGASTRODUODENOSCOPY (EGD) WITH DILATION  10/19/2015    HIP ARTHROPLASTY Left 5/28/2025    Procedure: ARTHROPLASTY, HIP, LEFT;  Surgeon: Consatntin Murray MD;  Location: [facility];  Service: Orthopedics;  Laterality: Left;  Dago    INSERTION OF VENOUS ACCESS PORT  2007    TUBAL LIGATION            Home Enviroment     Living Arrangement: Lives with family  Home Environment: 1-story house/ trailer, number of outside stairs: 5 with a railing, tub-shower  Home Safety Concerns: Pets in the home: dogs (2).    DISCHARGE CAREGIVER/SUPPORT SYSTEM     Identified post-op caregiver: Patient has children / family / friends to help, son, Didier Levi.  Patient's caregiver(s) will be able to provide physical assistance. Patient will have someone to assist overnight.      Caregiver present at pre-op interview:  No      PRE-OPERATIVE FUNCTIONAL STATUS     Employment: Disabled    Pre-op Functional Status: Patient is independent with mobility/ambulation, transfers, ADL's, IADL's.    Use of assistive device for ambulation: none  ADL: self care  ADL Limitations: difficulty with walking  Medical Restrictions: Unstable ambulation and Decreased range of motions in extremities    POTENTIAL BARRIERS TO DISCHARGE/POTENTIAL POST-OP COMPLICATIONS     Patient with hx of asthma, crohn's disease. Nausea & vomiting, scoliosis. Patient had left hip arthroplasty on 05/28/2025 without complication. POSSIBLE SAME DAY DISCHARGE.    DISCHARGE PLAN     Expected LOS of 1 days or less for joint replacement discussed with patient. We also discussed a discharge path of HH for approximately two weeks with a transition to outpatient PT on the third week given no post-op complications.      Patient in agreement with discharge plan: Yes    Discharge to: Discharge home with home health (PT/OT) x2 weeks with transition to outpatient PT     HH:  [facility] ([place]).  Patient disclosure form completed and sent to case management for upload to the medical record.      OP PT: [facility] ([place])     Home DME: rolling walker, bedside commode, tub transfer bench, and assistive device kit    Needed DME at D/C: none     Rx: Per Dr. Murray at discharge     Meds to Beds: Yes  Patient expected to discharge on  Aspirin 81mg by mouth twice daily for DVT prophylaxis.

## 2025-07-28 NOTE — DISCHARGE INSTRUCTIONS
To confirm, Your doctor has instructed you that surgery is scheduled for: 8/11/25 with DR. CANO    Please report to [facility], Registration the morning of surgery. You must check-in and receive a wristband before going to your procedure.  [address] [place] [address] [place] [address]    Pre-Op will call the FRIDAY prior to surgery between 1:00 and 6:00 PM with the final arrival time.  Phone number: [phone]    PLEASE NOTE:  The surgery schedule has many variables which may affect the time of your surgery case.  Family members should be available if your surgery time changes.  Plan to be here the day of your procedure between 6-8 hours.    MEDICATIONS:  TAKE ONLY THESE MEDICATIONS WITH A SMALL SIP OF WATER THE MORNING OF YOUR PROCEDURE:  SEE MED LIST      DO NOT TAKE THESE MEDICATIONS 5-7 DAYS PRIOR to your procedure or per your surgeon's request:   ASPIRIN, ALEVE, ADVIL, IBUPROFEN, FISH OIL VITAMIN E, HERBALS  (May take Tylenol)    ONLY if you are prescribed any types of blood thinners such as:  Aspirin, Coumadin, Plavix, Pradaxa, Xarelto, Aggrenox, Effient, Eliquis, Savasya, Brilinta, or any other, ask your surgeon whether you should stop taking them and how long before surgery you should stop.  You may also need to verify with the prescribing physician if it is ok to stop your medication.      INSTRUCTIONS IMPORTANT!!  Do not eat or drink anything between midnight and the time of your procedure- this includes gum, mints, and candy.  EXCEPT: you may have clear liquids such as:  WATER, BLACK COFFEE, UNSWEET TEA, OR GATORADE (NO RED OR PURPLE) UP TO 2 HOURS PRIOR TO YOUR ARRIVAL TIME.  Do not smoke or drink alcoholic beverages 24 hours prior to your procedure.  Shower the night before AND the morning of your procedure with a Chlorhexidine wash such as Hibiclens or Dial antibacterial soap from the neck down.  Do not get it on your face or in your eyes.  You may use your own shampoo and face wash.  This helps your skin to be as bacteria free as possible.    If you wear contact lenses, dentures, hearing aids or glasses, bring a container to put them in during surgery and give to a family member for safe keeping.  Please leave all jewelry, piercing's and valuables at home. You must remove your false eyelashes prior to surgery.    DO NOT remove hair from the surgery site.  Do not shave the incision site unless you are given specific instructions to do so.    ONLY if you have been diagnosed with sleep apnea please bring your C-PAP machine.  ONLY if you wear home oxygen please bring your portable oxygen tank the day of your procedure.  ONLY if you have a history of OPEN HEART SURGERY you will need a clearance from your Cardiologist per Anesthesia.      ONLY for patients requiring bowel prep, written instructions will be given by your doctor's office.  ONLY if you have any type of stimulator implant or any type of implanted device with a remote control.  Please bring the controller with you the morning of surgery  If your doctor has scheduled you for an overnight stay, bring a small overnight bag with any personal items you need.  Make arrangements in advance for transportation home by a responsible adult. You can not go home in an uber or a cab per hospital policy.  It is not safe to drive a vehicle during the 24 hours after anesthesia.          All  facilities and properties are tobacco free.  Smoking is NOT allowed.   If you have any questions about these instructions, call Pre-Op Admit  Nursing at [phone] or the Pre-Op Day Surgery Unit at [phone].

## 2025-07-28 NOTE — PRE ADMISSION SCREENING
Patient Name: Spencer Levi  YOB: 1965   MRN: [id number]     [facility]   Basic Mobility Inpatient Short Form 6 Clicks         How much difficulty does the patient currently have  Unable  A Lot  A Little  None      1. Turning over in bed (including adjusting bedclothes, sheets and blankets)?     1 []    2 []    3 []    4 [x]        2. Sitting down on and standing up from a chair with arms (e.g., wheelchair, bedside commode, etc.)     1 []  2 []  3 []     4 [x]      3. Moving from lying on back to sitting on the side of the bed?     1 []  2 []  3 []    4 [x]    How much help from another person does the patient currently need  Total  A Lot  A Little  None      4. Moving to and from a bed to a chair (including a wheelchair)?    1 []  2 []  3 []    4 [x]      5. Need to walk in hospital room?    1 []  2 []  3 []    4 [x]      6. Climbing 3-5 steps with a railing?    1 []  2 []  3 []    4 [x]       Raw Score:     24             CMS 0-100% Score:     0       %   Standardized Score:   61.14            CMS Modifier:     [facility] AMPAC   Basic Mobility Inpatient Short Form 6 Clicks Score Conversion Table*         *Use this form to convert -PAC Basic Mobility Inpatient Raw Scores.   [facility] Inpatient Basic Mobility Short Form Scoring Example   1. Add the number values associated with the response to each item. For example, items totals yield a Raw Score of 21.   2. Match the raw score to the t-Scale scores (t-Scale score = 50.25, SE = 4.69).   3. Find the associated CMS % (CMS % = 28.97%).   4. Locate the correct CMS Functional Modifier Code, or G Code (G code = CJ)     NOTE: Each -PAC Short Form has a separate conversion table. Make sure that you use the correct conversion table.       Instruction Manual - page 45 contains conversion table

## 2025-07-31 DIAGNOSIS — M16.11 PRIMARY OSTEOARTHRITIS OF RIGHT HIP: Primary | ICD-10-CM

## 2025-08-06 DIAGNOSIS — M16.11 PRIMARY OSTEOARTHRITIS OF RIGHT HIP: Primary | ICD-10-CM

## 2025-08-06 RX ORDER — ONDANSETRON 4 MG/1
4 TABLET, FILM COATED ORAL EVERY 8 HOURS PRN
Qty: 30 TABLET | Refills: 0 | Status: SHIPPED | OUTPATIENT
Start: 2025-08-06

## 2025-08-06 RX ORDER — DOCUSATE SODIUM 100 MG/1
100 CAPSULE, LIQUID FILLED ORAL 2 TIMES DAILY
Qty: 60 CAPSULE | Refills: 0 | Status: SHIPPED | OUTPATIENT
Start: 2025-08-06 | End: 2025-09-07

## 2025-08-06 RX ORDER — OXYCODONE AND ACETAMINOPHEN 7.5; 325 MG/1; MG/1
1 TABLET ORAL EVERY 6 HOURS PRN
Qty: 28 TABLET | Refills: 0 | Status: SHIPPED | OUTPATIENT
Start: 2025-08-06

## 2025-08-06 RX ORDER — GABAPENTIN 300 MG/1
300 CAPSULE ORAL 2 TIMES DAILY
Qty: 60 CAPSULE | Refills: 0 | Status: SHIPPED | OUTPATIENT
Start: 2025-08-06 | End: 2025-09-07

## 2025-08-06 RX ORDER — ASPIRIN 81 MG/1
81 TABLET ORAL EVERY 12 HOURS
Qty: 60 TABLET | Refills: 0 | Status: SHIPPED | OUTPATIENT
Start: 2025-08-06 | End: 2025-09-07

## 2025-08-06 RX ORDER — CELECOXIB 200 MG/1
200 CAPSULE ORAL 2 TIMES DAILY
Qty: 60 CAPSULE | Refills: 0 | Status: SHIPPED | OUTPATIENT
Start: 2025-08-06 | End: 2025-09-07

## 2025-08-08 ENCOUNTER — ANESTHESIA EVENT (OUTPATIENT)
Dept: SURGERY | Facility: HOSPITAL | Age: 60
End: 2025-08-08
Payer: MEDICAID

## 2025-08-11 ENCOUNTER — HOSPITAL ENCOUNTER (OUTPATIENT)
Facility: HOSPITAL | Age: 60
Discharge: HOME | End: 2025-08-11
Attending: ORTHOPAEDIC SURGERY | Admitting: ORTHOPAEDIC SURGERY
Payer: MEDICAID

## 2025-08-11 ENCOUNTER — ANESTHESIA (OUTPATIENT)
Dept: SURGERY | Facility: HOSPITAL | Age: 60
End: 2025-08-11
Payer: MEDICAID

## 2025-08-11 DIAGNOSIS — M16.11 PRIMARY OSTEOARTHRITIS OF RIGHT HIP: Primary | ICD-10-CM

## 2025-08-11 DIAGNOSIS — Z01.818 PRE-OP TESTING: ICD-10-CM

## 2025-08-11 PROCEDURE — 63600175 PHARM REV CODE 636 W HCPCS: Performed by: ANESTHESIOLOGY

## 2025-08-11 PROCEDURE — 97535 SELF CARE MNGMENT TRAINING: CPT

## 2025-08-11 PROCEDURE — 71000015 HC POSTOP RECOV 1ST HR: Performed by: ORTHOPAEDIC SURGERY

## 2025-08-11 PROCEDURE — 37000008 HC ANESTHESIA 1ST 15 MINUTES: Performed by: ORTHOPAEDIC SURGERY

## 2025-08-11 PROCEDURE — 63600175 PHARM REV CODE 636 W HCPCS: Performed by: ORTHOPAEDIC SURGERY

## 2025-08-11 PROCEDURE — 71000039 HC RECOVERY, EACH ADD'L HOUR: Performed by: ORTHOPAEDIC SURGERY

## 2025-08-11 PROCEDURE — 71000016 HC POSTOP RECOV ADDL HR: Performed by: ORTHOPAEDIC SURGERY

## 2025-08-11 PROCEDURE — 63600175 PHARM REV CODE 636 W HCPCS: Mod: JZ,TB | Performed by: ANESTHESIOLOGY

## 2025-08-11 PROCEDURE — C1769 GUIDE WIRE: HCPCS | Performed by: ORTHOPAEDIC SURGERY

## 2025-08-11 PROCEDURE — 63600175 PHARM REV CODE 636 W HCPCS: Performed by: NURSE ANESTHETIST, CERTIFIED REGISTERED

## 2025-08-11 PROCEDURE — 97161 PT EVAL LOW COMPLEX 20 MIN: CPT

## 2025-08-11 PROCEDURE — 97165 OT EVAL LOW COMPLEX 30 MIN: CPT

## 2025-08-11 PROCEDURE — 71000033 HC RECOVERY, INTIAL HOUR: Performed by: ORTHOPAEDIC SURGERY

## 2025-08-11 PROCEDURE — 25000003 PHARM REV CODE 250: Performed by: ORTHOPAEDIC SURGERY

## 2025-08-11 PROCEDURE — 36000711: Performed by: ORTHOPAEDIC SURGERY

## 2025-08-11 PROCEDURE — 25000003 PHARM REV CODE 250: Performed by: NURSE ANESTHETIST, CERTIFIED REGISTERED

## 2025-08-11 PROCEDURE — 97116 GAIT TRAINING THERAPY: CPT

## 2025-08-11 PROCEDURE — 36000710: Performed by: ORTHOPAEDIC SURGERY

## 2025-08-11 PROCEDURE — 25000003 PHARM REV CODE 250: Performed by: ANESTHESIOLOGY

## 2025-08-11 PROCEDURE — 37000009 HC ANESTHESIA EA ADD 15 MINS: Performed by: ORTHOPAEDIC SURGERY

## 2025-08-11 PROCEDURE — 27201423 OPTIME MED/SURG SUP & DEVICES STERILE SUPPLY: Performed by: ORTHOPAEDIC SURGERY

## 2025-08-11 PROCEDURE — C1776 JOINT DEVICE (IMPLANTABLE): HCPCS | Performed by: ORTHOPAEDIC SURGERY

## 2025-08-11 PROCEDURE — 99900031 HC PATIENT EDUCATION (STAT)

## 2025-08-11 PROCEDURE — 94799 UNLISTED PULMONARY SVC/PX: CPT

## 2025-08-11 PROCEDURE — 27130 TOTAL HIP ARTHROPLASTY: CPT | Mod: 79,RT,, | Performed by: ORTHOPAEDIC SURGERY

## 2025-08-11 DEVICE — IMPLANTABLE DEVICE: Type: IMPLANTABLE DEVICE | Site: HIP | Status: FUNCTIONAL

## 2025-08-11 DEVICE — LINER ACET PNCL ALT 10 +4 36X5: Type: IMPLANTABLE DEVICE | Site: HIP | Status: FUNCTIONAL

## 2025-08-11 DEVICE — STEM FEM STD OFFSET SZ 7: Type: IMPLANTABLE DEVICE | Site: HIP | Status: FUNCTIONAL

## 2025-08-11 DEVICE — CUP 54MM: Type: IMPLANTABLE DEVICE | Site: HIP | Status: FUNCTIONAL

## 2025-08-11 RX ORDER — SODIUM CHLORIDE 0.9 % (FLUSH) 0.9 %
5 SYRINGE (ML) INJECTION
Status: DISCONTINUED | OUTPATIENT
Start: 2025-08-11 | End: 2025-08-11 | Stop reason: HOSPADM

## 2025-08-11 RX ORDER — CELECOXIB 100 MG/1
400 CAPSULE ORAL
Status: COMPLETED | OUTPATIENT
Start: 2025-08-11 | End: 2025-08-11

## 2025-08-11 RX ORDER — PROPOFOL 10 MG/ML
VIAL (ML) INTRAVENOUS
Status: DISCONTINUED | OUTPATIENT
Start: 2025-08-11 | End: 2025-08-11

## 2025-08-11 RX ORDER — KETAMINE HCL IN 0.9 % NACL 50 MG/5 ML
SYRINGE (ML) INTRAVENOUS
Status: DISCONTINUED | OUTPATIENT
Start: 2025-08-11 | End: 2025-08-11

## 2025-08-11 RX ORDER — FENTANYL CITRATE 50 UG/ML
25 INJECTION, SOLUTION INTRAMUSCULAR; INTRAVENOUS EVERY 5 MIN PRN
Status: COMPLETED | OUTPATIENT
Start: 2025-08-11 | End: 2025-08-11

## 2025-08-11 RX ORDER — DEXAMETHASONE SODIUM PHOSPHATE 4 MG/ML
INJECTION, SOLUTION INTRA-ARTICULAR; INTRALESIONAL; INTRAMUSCULAR; INTRAVENOUS; SOFT TISSUE
Status: DISCONTINUED | OUTPATIENT
Start: 2025-08-11 | End: 2025-08-11

## 2025-08-11 RX ORDER — LIDOCAINE HYDROCHLORIDE 10 MG/ML
1 INJECTION, SOLUTION EPIDURAL; INFILTRATION; INTRACAUDAL; PERINEURAL ONCE
Status: DISCONTINUED | OUTPATIENT
Start: 2025-08-11 | End: 2025-08-11 | Stop reason: HOSPADM

## 2025-08-11 RX ORDER — HYDROMORPHONE HYDROCHLORIDE 2 MG/ML
0.2 INJECTION, SOLUTION INTRAMUSCULAR; INTRAVENOUS; SUBCUTANEOUS EVERY 5 MIN PRN
Status: COMPLETED | OUTPATIENT
Start: 2025-08-11 | End: 2025-08-11

## 2025-08-11 RX ORDER — TRAMADOL HYDROCHLORIDE 50 MG/1
50 TABLET, FILM COATED ORAL EVERY 6 HOURS
COMMUNITY

## 2025-08-11 RX ORDER — FENTANYL CITRATE 50 UG/ML
INJECTION, SOLUTION INTRAMUSCULAR; INTRAVENOUS
Status: DISCONTINUED | OUTPATIENT
Start: 2025-08-11 | End: 2025-08-11

## 2025-08-11 RX ORDER — SODIUM CHLORIDE, SODIUM LACTATE, POTASSIUM CHLORIDE, CALCIUM CHLORIDE 600; 310; 30; 20 MG/100ML; MG/100ML; MG/100ML; MG/100ML
INJECTION, SOLUTION INTRAVENOUS CONTINUOUS
Status: DISCONTINUED | OUTPATIENT
Start: 2025-08-11 | End: 2025-08-11 | Stop reason: HOSPADM

## 2025-08-11 RX ORDER — ACETAMINOPHEN 500 MG
1000 TABLET ORAL
Status: COMPLETED | OUTPATIENT
Start: 2025-08-11 | End: 2025-08-11

## 2025-08-11 RX ORDER — ZOLPIDEM TARTRATE 5 MG/1
5 TABLET ORAL NIGHTLY PRN
Status: CANCELLED | OUTPATIENT
Start: 2025-08-11

## 2025-08-11 RX ORDER — PROMETHAZINE HYDROCHLORIDE 25 MG/1
25 TABLET ORAL EVERY 4 HOURS
COMMUNITY

## 2025-08-11 RX ORDER — LIDOCAINE HYDROCHLORIDE 20 MG/ML
INJECTION INTRAVENOUS
Status: DISCONTINUED | OUTPATIENT
Start: 2025-08-11 | End: 2025-08-11

## 2025-08-11 RX ORDER — KETOROLAC TROMETHAMINE 30 MG/ML
30 INJECTION, SOLUTION INTRAMUSCULAR; INTRAVENOUS ONCE
Status: COMPLETED | OUTPATIENT
Start: 2025-08-11 | End: 2025-08-11

## 2025-08-11 RX ORDER — ASPIRIN 81 MG/1
81 TABLET ORAL DAILY
COMMUNITY

## 2025-08-11 RX ORDER — NEOSTIGMINE METHYLSULFATE 1 MG/ML
INJECTION INTRAVENOUS
Status: DISCONTINUED | OUTPATIENT
Start: 2025-08-11 | End: 2025-08-11

## 2025-08-11 RX ORDER — CEFAZOLIN 2 G/1
2 INJECTION, POWDER, FOR SOLUTION INTRAMUSCULAR; INTRAVENOUS
Status: COMPLETED | OUTPATIENT
Start: 2025-08-11 | End: 2025-08-11

## 2025-08-11 RX ORDER — OXYCODONE HCL 10 MG/1
10 TABLET, FILM COATED, EXTENDED RELEASE ORAL
Status: COMPLETED | OUTPATIENT
Start: 2025-08-11 | End: 2025-08-11

## 2025-08-11 RX ORDER — BISACODYL 10 MG/1
10 SUPPOSITORY RECTAL DAILY
Status: CANCELLED | OUTPATIENT
Start: 2025-08-11 | End: 2025-08-14

## 2025-08-11 RX ORDER — ONDANSETRON HYDROCHLORIDE 2 MG/ML
INJECTION, SOLUTION INTRAMUSCULAR; INTRAVENOUS
Status: DISCONTINUED | OUTPATIENT
Start: 2025-08-11 | End: 2025-08-11

## 2025-08-11 RX ORDER — FAMOTIDINE 20 MG/1
20 TABLET, FILM COATED ORAL 2 TIMES DAILY
Status: CANCELLED | OUTPATIENT
Start: 2025-08-11

## 2025-08-11 RX ORDER — ROCURONIUM BROMIDE 10 MG/ML
INJECTION, SOLUTION INTRAVENOUS
Status: DISCONTINUED | OUTPATIENT
Start: 2025-08-11 | End: 2025-08-11

## 2025-08-11 RX ORDER — HYDROCODONE BITARTRATE AND ACETAMINOPHEN 5; 325 MG/1; MG/1
1 TABLET ORAL EVERY 4 HOURS PRN
Refills: 0 | Status: CANCELLED | OUTPATIENT
Start: 2025-08-11

## 2025-08-11 RX ORDER — LABETALOL HYDROCHLORIDE 5 MG/ML
INJECTION, SOLUTION INTRAVENOUS
Status: DISCONTINUED | OUTPATIENT
Start: 2025-08-11 | End: 2025-08-11

## 2025-08-11 RX ORDER — OXYCODONE HYDROCHLORIDE 5 MG/1
5 TABLET ORAL ONCE AS NEEDED
Status: COMPLETED | OUTPATIENT
Start: 2025-08-11 | End: 2025-08-11

## 2025-08-11 RX ORDER — METOCLOPRAMIDE HYDROCHLORIDE 5 MG/ML
10 INJECTION INTRAMUSCULAR; INTRAVENOUS EVERY 10 MIN PRN
Status: DISCONTINUED | OUTPATIENT
Start: 2025-08-11 | End: 2025-08-11 | Stop reason: HOSPADM

## 2025-08-11 RX ORDER — MUPIROCIN 20 MG/G
1 OINTMENT TOPICAL 2 TIMES DAILY
Status: CANCELLED | OUTPATIENT
Start: 2025-08-11 | End: 2025-08-16

## 2025-08-11 RX ORDER — CEFAZOLIN SODIUM 1 G/3ML
1 INJECTION, POWDER, FOR SOLUTION INTRAMUSCULAR; INTRAVENOUS
Status: CANCELLED | OUTPATIENT
Start: 2025-08-11 | End: 2025-08-12

## 2025-08-11 RX ORDER — MIDAZOLAM HYDROCHLORIDE 1 MG/ML
INJECTION INTRAMUSCULAR; INTRAVENOUS
Status: DISCONTINUED | OUTPATIENT
Start: 2025-08-11 | End: 2025-08-11

## 2025-08-11 RX ADMIN — LABETALOL HYDROCHLORIDE 5 MG: 5 INJECTION INTRAVENOUS at 09:08

## 2025-08-11 RX ADMIN — HYDROMORPHONE HYDROCHLORIDE 0.2 MG: 2 INJECTION INTRAMUSCULAR; INTRAVENOUS; SUBCUTANEOUS at 11:08

## 2025-08-11 RX ADMIN — OXYCODONE HYDROCHLORIDE 5 MG: 5 TABLET ORAL at 10:08

## 2025-08-11 RX ADMIN — FENTANYL CITRATE 25 MCG: 50 INJECTION INTRAMUSCULAR; INTRAVENOUS at 10:08

## 2025-08-11 RX ADMIN — LIDOCAINE HYDROCHLORIDE 100 MG: 20 INJECTION, SOLUTION INTRAVENOUS at 08:08

## 2025-08-11 RX ADMIN — FENTANYL CITRATE 50 MCG: 50 INJECTION, SOLUTION INTRAMUSCULAR; INTRAVENOUS at 08:08

## 2025-08-11 RX ADMIN — Medication 25 MG: at 08:08

## 2025-08-11 RX ADMIN — ACETAMINOPHEN 1000 MG: 500 TABLET ORAL at 06:08

## 2025-08-11 RX ADMIN — FENTANYL CITRATE 100 MCG: 50 INJECTION, SOLUTION INTRAMUSCULAR; INTRAVENOUS at 08:08

## 2025-08-11 RX ADMIN — SODIUM CHLORIDE, SODIUM GLUCONATE, SODIUM ACETATE, POTASSIUM CHLORIDE AND MAGNESIUM CHLORIDE: 526; 502; 368; 37; 30 INJECTION, SOLUTION INTRAVENOUS at 06:08

## 2025-08-11 RX ADMIN — ROCURONIUM BROMIDE 50 MG: 10 INJECTION, SOLUTION INTRAVENOUS at 08:08

## 2025-08-11 RX ADMIN — KETOROLAC TROMETHAMINE 30 MG: 30 INJECTION, SOLUTION INTRAMUSCULAR at 11:08

## 2025-08-11 RX ADMIN — PROPOFOL 200 MG: 10 INJECTION, EMULSION INTRAVENOUS at 08:08

## 2025-08-11 RX ADMIN — CELECOXIB 400 MG: 100 CAPSULE ORAL at 06:08

## 2025-08-11 RX ADMIN — CEFAZOLIN 2 G: 2 INJECTION, POWDER, FOR SOLUTION INTRAMUSCULAR; INTRAVENOUS at 08:08

## 2025-08-11 RX ADMIN — DEXAMETHASONE SODIUM PHOSPHATE 4 MG: 4 INJECTION, SOLUTION INTRA-ARTICULAR; INTRALESIONAL; INTRAMUSCULAR; INTRAVENOUS; SOFT TISSUE at 08:08

## 2025-08-11 RX ADMIN — NEOSTIGMINE METHYLSULFATE 5 MG: 1 INJECTION INTRAVENOUS at 09:08

## 2025-08-11 RX ADMIN — GLYCOPYRROLATE 0.6 MG: 0.2 INJECTION, SOLUTION INTRAMUSCULAR; INTRAVITREAL at 09:08

## 2025-08-11 RX ADMIN — HYDROMORPHONE HYDROCHLORIDE 0.2 MG: 2 INJECTION INTRAMUSCULAR; INTRAVENOUS; SUBCUTANEOUS at 12:08

## 2025-08-11 RX ADMIN — ONDANSETRON 4 MG: 2 INJECTION INTRAMUSCULAR; INTRAVENOUS at 08:08

## 2025-08-11 RX ADMIN — OXYCODONE HYDROCHLORIDE 10 MG: 10 TABLET, FILM COATED, EXTENDED RELEASE ORAL at 06:08

## 2025-08-11 RX ADMIN — FENTANYL CITRATE 50 MCG: 50 INJECTION, SOLUTION INTRAMUSCULAR; INTRAVENOUS at 09:08

## 2025-08-11 RX ADMIN — SODIUM CHLORIDE, SODIUM GLUCONATE, SODIUM ACETATE, POTASSIUM CHLORIDE AND MAGNESIUM CHLORIDE: 526; 502; 368; 37; 30 INJECTION, SOLUTION INTRAVENOUS at 09:08

## 2025-08-11 RX ADMIN — TRANEXAMIC ACID 800 MG: 100 INJECTION, SOLUTION INTRAVENOUS at 08:08

## 2025-08-11 RX ADMIN — TRANEXAMIC ACID 800 MG: 100 INJECTION, SOLUTION INTRAVENOUS at 09:08

## 2025-08-11 RX ADMIN — MIDAZOLAM HYDROCHLORIDE 2 MG: 1 INJECTION, SOLUTION INTRAMUSCULAR; INTRAVENOUS at 08:08

## 2025-08-12 ENCOUNTER — HOSPITAL ENCOUNTER (EMERGENCY)
Facility: HOSPITAL | Age: 60
Discharge: HOME | End: 2025-08-12
Attending: EMERGENCY MEDICINE
Payer: MEDICAID

## 2025-08-12 ENCOUNTER — OFFICE VISIT (OUTPATIENT)
Dept: ORTHOPEDICS | Facility: CLINIC | Age: 60
End: 2025-08-12
Payer: MEDICAID

## 2025-08-12 VITALS
OXYGEN SATURATION: 100 % | HEART RATE: 72 BPM | WEIGHT: 183 LBS | SYSTOLIC BLOOD PRESSURE: 125 MMHG | TEMPERATURE: 98 F | DIASTOLIC BLOOD PRESSURE: 58 MMHG | BODY MASS INDEX: 27.11 KG/M2 | RESPIRATION RATE: 18 BRPM | HEIGHT: 69 IN

## 2025-08-12 VITALS — BODY MASS INDEX: 27.11 KG/M2 | HEIGHT: 69 IN | WEIGHT: 183 LBS

## 2025-08-12 DIAGNOSIS — R07.9 CHEST PAIN: ICD-10-CM

## 2025-08-12 DIAGNOSIS — R09.1 PLEURISY: Primary | ICD-10-CM

## 2025-08-12 DIAGNOSIS — Z96.641 STATUS POST TOTAL HIP REPLACEMENT, RIGHT: Primary | ICD-10-CM

## 2025-08-12 LAB
ABSOLUTE EOSINOPHIL (SMH): 0.03 K/UL
ABSOLUTE MONOCYTE (SMH): 0.67 K/UL (ref 0.3–1)
ABSOLUTE NEUTROPHIL COUNT (SMH): 3.1 K/UL (ref 1.8–7.7)
ALBUMIN SERPL-MCNC: 3.5 G/DL (ref 3.5–5.2)
ALP SERPL-CCNC: 75 UNIT/L (ref 40–150)
ALT SERPL-CCNC: 16 UNIT/L (ref 10–44)
ANION GAP (SMH): 11 MMOL/L (ref 8–16)
AST SERPL-CCNC: 34 UNIT/L (ref 11–45)
BASOPHILS # BLD AUTO: 0.01 K/UL
BASOPHILS NFR BLD AUTO: 0.2 %
BILIRUB SERPL-MCNC: 0.5 MG/DL (ref 0.1–1)
BUN SERPL-MCNC: 25 MG/DL (ref 8–23)
CALCIUM SERPL-MCNC: 8.6 MG/DL (ref 8.7–10.5)
CHLORIDE SERPL-SCNC: 103 MMOL/L (ref 95–110)
CO2 SERPL-SCNC: 26 MMOL/L (ref 23–29)
CREAT SERPL-MCNC: 1 MG/DL (ref 0.5–1.4)
D DIMER PPP IA.FEU-MCNC: 2.43 MG/L FEU
ERYTHROCYTE [DISTWIDTH] IN BLOOD BY AUTOMATED COUNT: 13 % (ref 11.5–14.5)
GFR SERPLBLD CREATININE-BSD FMLA CKD-EPI: >60 ML/MIN/1.73/M2
GLUCOSE SERPL-MCNC: 91 MG/DL (ref 70–110)
HCT VFR BLD AUTO: 26.9 % (ref 37–48.5)
HGB BLD-MCNC: 9 GM/DL (ref 12–16)
IMM GRANULOCYTES # BLD AUTO: 0.01 K/UL (ref 0–0.04)
IMM GRANULOCYTES NFR BLD AUTO: 0.2 % (ref 0–0.5)
LYMPHOCYTES # BLD AUTO: 2.63 K/UL (ref 1–4.8)
MCH RBC QN AUTO: 33.1 PG (ref 27–31)
MCHC RBC AUTO-ENTMCNC: 33.5 G/DL (ref 32–36)
MCV RBC AUTO: 99 FL (ref 82–98)
NT-PROBNP SERPL-MCNC: 56 PG/ML
NUCLEATED RBC (/100WBC) (SMH): 0 /100 WBC
OHS QRS DURATION: 142 MS
OHS QTC CALCULATION: 446 MS
PLATELET # BLD AUTO: 199 K/UL (ref 150–450)
PMV BLD AUTO: 10.6 FL (ref 9.2–12.9)
POTASSIUM SERPL-SCNC: 3.8 MMOL/L (ref 3.5–5.1)
PROT SERPL-MCNC: 6.5 GM/DL (ref 6–8.4)
RBC # BLD AUTO: 2.72 M/UL (ref 4–5.4)
RELATIVE EOSINOPHIL (SMH): 0.5 % (ref 0–8)
RELATIVE LYMPHOCYTE (SMH): 41.1 % (ref 18–48)
RELATIVE MONOCYTE (SMH): 10.5 % (ref 4–15)
RELATIVE NEUTROPHIL (SMH): 47.5 % (ref 38–73)
SODIUM SERPL-SCNC: 140 MMOL/L (ref 136–145)
TROPONIN I SERPL HS-MCNC: <3 NG/L
WBC # BLD AUTO: 6.4 K/UL (ref 3.9–12.7)

## 2025-08-12 PROCEDURE — 4010F ACE/ARB THERAPY RXD/TAKEN: CPT | Mod: CPTII,,, | Performed by: ORTHOPAEDIC SURGERY

## 2025-08-12 PROCEDURE — 83880 ASSAY OF NATRIURETIC PEPTIDE: CPT | Performed by: NURSE PRACTITIONER

## 2025-08-12 PROCEDURE — 99024 POSTOP FOLLOW-UP VISIT: CPT | Mod: ,,, | Performed by: ORTHOPAEDIC SURGERY

## 2025-08-12 PROCEDURE — 99999 PR PBB SHADOW E&M-EST. PATIENT-LVL III: CPT | Mod: PBBFAC,,, | Performed by: ORTHOPAEDIC SURGERY

## 2025-08-12 PROCEDURE — 85025 COMPLETE CBC W/AUTO DIFF WBC: CPT | Performed by: NURSE PRACTITIONER

## 2025-08-12 PROCEDURE — 36415 COLL VENOUS BLD VENIPUNCTURE: CPT | Performed by: NURSE PRACTITIONER

## 2025-08-12 PROCEDURE — 93005 ELECTROCARDIOGRAM TRACING: CPT

## 2025-08-12 PROCEDURE — 25500020 PHARM REV CODE 255

## 2025-08-12 PROCEDURE — 93010 ELECTROCARDIOGRAM REPORT: CPT | Mod: ,,, | Performed by: INTERNAL MEDICINE

## 2025-08-12 PROCEDURE — 80053 COMPREHEN METABOLIC PANEL: CPT | Performed by: NURSE PRACTITIONER

## 2025-08-12 PROCEDURE — 3044F HG A1C LEVEL LT 7.0%: CPT | Mod: CPTII,,, | Performed by: ORTHOPAEDIC SURGERY

## 2025-08-12 PROCEDURE — 1160F RVW MEDS BY RX/DR IN RCRD: CPT | Mod: CPTII,,, | Performed by: ORTHOPAEDIC SURGERY

## 2025-08-12 PROCEDURE — 85379 FIBRIN DEGRADATION QUANT: CPT | Performed by: NURSE PRACTITIONER

## 2025-08-12 PROCEDURE — 99213 OFFICE O/P EST LOW 20 MIN: CPT | Mod: PBBFAC,PN | Performed by: ORTHOPAEDIC SURGERY

## 2025-08-12 PROCEDURE — 84484 ASSAY OF TROPONIN QUANT: CPT | Performed by: NURSE PRACTITIONER

## 2025-08-12 PROCEDURE — 99285 EMERGENCY DEPT VISIT HI MDM: CPT | Mod: 25

## 2025-08-12 PROCEDURE — 1159F MED LIST DOCD IN RCRD: CPT | Mod: CPTII,,, | Performed by: ORTHOPAEDIC SURGERY

## 2025-08-12 RX ADMIN — IOHEXOL 75 ML: 350 INJECTION, SOLUTION INTRAVENOUS at 11:08

## 2025-08-13 VITALS
RESPIRATION RATE: 18 BRPM | BODY MASS INDEX: 27.11 KG/M2 | DIASTOLIC BLOOD PRESSURE: 77 MMHG | TEMPERATURE: 98 F | HEART RATE: 74 BPM | SYSTOLIC BLOOD PRESSURE: 182 MMHG | OXYGEN SATURATION: 98 % | WEIGHT: 183 LBS | HEIGHT: 69 IN

## 2025-08-14 ENCOUNTER — PATIENT OUTREACH (OUTPATIENT)
Facility: OTHER | Age: 60
End: 2025-08-14
Payer: MEDICAID

## 2025-08-22 ENCOUNTER — CLINICAL SUPPORT (OUTPATIENT)
Dept: ORTHOPEDICS | Facility: CLINIC | Age: 60
End: 2025-08-22
Payer: MEDICAID

## 2025-08-22 ENCOUNTER — TELEPHONE (OUTPATIENT)
Dept: ORTHOPEDICS | Facility: CLINIC | Age: 60
End: 2025-08-22

## 2025-08-22 VITALS — BODY MASS INDEX: 27.11 KG/M2 | HEIGHT: 69 IN | WEIGHT: 183 LBS

## 2025-08-22 DIAGNOSIS — Z96.641 STATUS POST TOTAL HIP REPLACEMENT, RIGHT: Primary | ICD-10-CM

## 2025-08-22 DIAGNOSIS — Z96.641 STATUS POST TOTAL HIP REPLACEMENT, RIGHT: ICD-10-CM

## 2025-08-22 PROCEDURE — 99213 OFFICE O/P EST LOW 20 MIN: CPT | Mod: PBBFAC,PN

## 2025-08-22 PROCEDURE — 99999 PR PBB SHADOW E&M-EST. PATIENT-LVL III: CPT | Mod: PBBFAC,,,

## 2025-08-22 RX ORDER — OXYCODONE AND ACETAMINOPHEN 7.5; 325 MG/1; MG/1
1 TABLET ORAL EVERY 6 HOURS PRN
Qty: 28 TABLET | Refills: 0 | Status: SHIPPED | OUTPATIENT
Start: 2025-08-22

## 2025-09-02 DIAGNOSIS — Z96.641 STATUS POST TOTAL HIP REPLACEMENT, RIGHT: ICD-10-CM

## 2025-09-02 DIAGNOSIS — Z96.642 STATUS POST TOTAL HIP REPLACEMENT, LEFT: Primary | ICD-10-CM

## 2025-09-03 RX ORDER — OXYCODONE AND ACETAMINOPHEN 7.5; 325 MG/1; MG/1
1 TABLET ORAL EVERY 8 HOURS PRN
Qty: 21 TABLET | Refills: 0 | Status: SHIPPED | OUTPATIENT
Start: 2025-09-03

## (undated) DEVICE — ADHESIVE MASTISOL VIAL 48/BX

## (undated) DEVICE — GLOVE SENSICARE PI GRN 9

## (undated) DEVICE — GLOVE SENSICARE PI ALOE 6

## (undated) DEVICE — SUT MONOCRYL PLUS UD 3-0 27

## (undated) DEVICE — SYS CLSR DERMABOND PRINEO 42CM

## (undated) DEVICE — DRAPE INCISE IOBAN 2 23X33IN

## (undated) DEVICE — INTERPULSE SET

## (undated) DEVICE — SLEEVE SCD EXPRESS KNEE MEDIUM

## (undated) DEVICE — DRESSING GAUZE OIL EMUL 3X8

## (undated) DEVICE — COVER LIGHT HANDLE 80/CA

## (undated) DEVICE — GLOVE SENSICARE PI SURG 8.5

## (undated) DEVICE — PILLOW ABDUCTION MED

## (undated) DEVICE — DRAPE HIP PCH 112X137X89IN

## (undated) DEVICE — YANKAUER FLEX NO VENT HI CAP

## (undated) DEVICE — GLOVE SENSICARE PI GRN 7

## (undated) DEVICE — MANIFOLD 4 PORT

## (undated) DEVICE — COVER MAYO STND XL 30X57IN

## (undated) DEVICE — DRAPE STERI U-SHAPED 47X51IN

## (undated) DEVICE — SUT STRATAFIX PDS 2 CT-1 14IN

## (undated) DEVICE — SYS SURGIPHOR STRL IRRG

## (undated) DEVICE — SOL NACL IRR 3000ML

## (undated) DEVICE — TAPE ADH MEDIPORE 4 X 10YDS

## (undated) DEVICE — SUT STRATAFIX SPIRAL VIOLET

## (undated) DEVICE — DRAPE ORTH SPLIT 77X108IN

## (undated) DEVICE — GLOVE SENSICARE PI ALOE 7

## (undated) DEVICE — APPLICATOR CHLORAPREP ORN 26ML

## (undated) DEVICE — ELECTRODE BLD EXT 6.50 ST DISP

## (undated) DEVICE — GLOVE SENSICARE PI GRN 6

## (undated) DEVICE — TOGA FLYTE PEEL AWAY XLARGE

## (undated) DEVICE — BLADE SAW SGTL NARROW 0.89

## (undated) DEVICE — DRAPE U SPLIT SHEET 54X76IN

## (undated) DEVICE — CLOSURE SKIN STERI STRIP 1/2X4

## (undated) DEVICE — PACK SIRUS BASIC V SURG STRL

## (undated) DEVICE — STRAP OR TABLE 5IN X 72IN

## (undated) DEVICE — TOWEL OR DISP STRL BLUE 4/PK

## (undated) DEVICE — ALCOHOL 70% ANTISEPTIC ISO 4OZ

## (undated) DEVICE — WRAP SHLDR HIP ACCU THRM PACK

## (undated) DEVICE — GLOVE SENSICARE PI ALOE 6.5

## (undated) DEVICE — HANDPIECE INTERPULSE SET

## (undated) DEVICE — Device

## (undated) DEVICE — COVER CAMERA OPERATING ROOM

## (undated) DEVICE — SOL IRRI STRL WATER 1000ML

## (undated) DEVICE — CONNECTOR TUBING STR 5 IN 1

## (undated) DEVICE — BNDG COFLEX FOAM LF2 ST 6X5YD

## (undated) DEVICE — BLADE SGTL XTK LNG 25X78.5X1.5

## (undated) DEVICE — IMPLANTABLE DEVICE
Type: IMPLANTABLE DEVICE | Site: HIP | Status: NON-FUNCTIONAL
Removed: 2025-08-11

## (undated) DEVICE — SUT CTD VICRYL 1 UND BR

## (undated) DEVICE — TRAY TOTAL HIP SMH

## (undated) DEVICE — DRAPE STERI INSTRUMENT 1018

## (undated) DEVICE — SOCKINETTE IMPERVIOUS 12X48IN

## (undated) DEVICE — DRAPE SURG U SLOT 47X70 LONG

## (undated) DEVICE — SUT STRATAFIX PDS 1 CTX 18IN

## (undated) DEVICE — PAD ABDOMINAL STERILE 8X10IN

## (undated) DEVICE — SUT FIBERWIRE 2 38 IN TAPER

## (undated) DEVICE — SOL NACL IRR 1000ML BTL

## (undated) DEVICE — SPONGE BULKEE II ABSRB 6X6.75

## (undated) DEVICE — COVER TABLE 44X90 STERILE

## (undated) DEVICE — DRAPE SPLIT ADHESIVE 77X120IN

## (undated) DEVICE — BLADE SURG CARBON STEEL #10

## (undated) DEVICE — ELECTRODE MEGADYNE RETURN DUAL

## (undated) DEVICE — GLOVE SENSICARE PI ALOE 8.5

## (undated) DEVICE — SYS CLSR DERMABOND PRINEO 22CM

## (undated) DEVICE — GLOVE SENSICARE PI GRN 8.5

## (undated) DEVICE — GOWN TOGA SYS PEELWY ZIP 2 XL

## (undated) DEVICE — DRAPE THREE-QTR REINF 53X77IN

## (undated) DEVICE — DRAPE INVISISHIELD TOWEL SMALL

## (undated) DEVICE — SEALER AQUAMANTYS 3.48MM

## (undated) DEVICE — PILLOW HIP ABDUCTION MED

## (undated) DEVICE — DRAPE INCISE IOBAN 2 23X17IN

## (undated) DEVICE — PAD ABDOMINAL STERILE 5X9IN

## (undated) DEVICE — SOL NACL 0.9% IV INJ 250ML

## (undated) DEVICE — PACK CUSTOM UNIV BASIN SLI